# Patient Record
Sex: FEMALE | Race: WHITE | Employment: FULL TIME | URBAN - METROPOLITAN AREA
[De-identification: names, ages, dates, MRNs, and addresses within clinical notes are randomized per-mention and may not be internally consistent; named-entity substitution may affect disease eponyms.]

---

## 2017-01-07 DIAGNOSIS — E11.43 TYPE 2 DIABETES MELLITUS WITH AUTONOMIC NEUROPATHY (H): Primary | ICD-10-CM

## 2017-01-09 NOTE — TELEPHONE ENCOUNTER
Trulicity      Last Written Prescription Date: 10/17/16  Last Fill Quantity: 6ml,  # refills: 0   Last Office Visit with [facility], [facility] or [facility] prescribing provider: 10/20/16

## 2017-01-10 RX ORDER — DULAGLUTIDE 1.5 MG/.5ML
INJECTION, SOLUTION SUBCUTANEOUS
Qty: 6 ML | Refills: 1 | Status: SHIPPED | OUTPATIENT
Start: 2017-01-10 | End: 2017-06-14

## 2017-04-11 DIAGNOSIS — I10 BENIGN ESSENTIAL HYPERTENSION: ICD-10-CM

## 2017-04-11 DIAGNOSIS — E87.6 HYPOKALEMIA: ICD-10-CM

## 2017-04-11 NOTE — TELEPHONE ENCOUNTER
Metoprolol      Last Written Prescription Date: 03/22/2017  Last Fill Quantity: 30, # refills: 0  Last Office Visit with [facility] or  Health prescribing provider: 10/20/2016    Potassium      Last Written Prescription Date: 03/22/2017  Last Fill Quantity: 30, # refills: 0  Last Office Visit with [facility] or  Health prescribing provider: 10/20/2016    Next 5 appointments (look out 90 days)     Apr 20, 2017 10:00 AM CDT   (Arrive by 9:45 AM)   SHORT with Daja Obrien, NP   [facility] ([facility] )    [address] [facility] [address]   [phone]                   Potassium   Date Value Ref Range Status   10/20/2016 4.2 3.4 - 5.3 mmol/L Final     Creatinine   Date Value Ref Range Status   10/20/2016 0.66 0.52 - 1.04 mg/dL Final     BP Readings from Last 3 Encounters:   10/20/16 138/90   01/20/16 122/80   11/16/15 136/88

## 2017-04-12 RX ORDER — POTASSIUM CHLORIDE 1500 MG/1
TABLET, EXTENDED RELEASE ORAL
Qty: 30 TABLET | Refills: 0 | Status: SHIPPED | OUTPATIENT
Start: 2017-04-12 | End: 2017-05-11

## 2017-04-12 RX ORDER — METOPROLOL SUCCINATE 25 MG/1
TABLET, EXTENDED RELEASE ORAL
Qty: 30 TABLET | Refills: 5 | Status: SHIPPED | OUTPATIENT
Start: 2017-04-12 | End: 2017-11-28

## 2017-04-20 ENCOUNTER — OFFICE VISIT (OUTPATIENT)
Dept: FAMILY MEDICINE | Facility: OTHER | Age: 47
End: 2017-04-20
Attending: NURSE PRACTITIONER
Payer: COMMERCIAL

## 2017-04-20 VITALS
HEIGHT: 60 IN | TEMPERATURE: 97.2 F | HEART RATE: 107 BPM | WEIGHT: 170 LBS | DIASTOLIC BLOOD PRESSURE: 88 MMHG | SYSTOLIC BLOOD PRESSURE: 136 MMHG | BODY MASS INDEX: 33.38 KG/M2

## 2017-04-20 DIAGNOSIS — N39.46 MIXED INCONTINENCE URGE AND STRESS (MALE)(FEMALE): ICD-10-CM

## 2017-04-20 DIAGNOSIS — Z79.899 ON STATIN THERAPY: ICD-10-CM

## 2017-04-20 DIAGNOSIS — E11.42 DIABETIC POLYNEUROPATHY ASSOCIATED WITH TYPE 2 DIABETES MELLITUS (H): ICD-10-CM

## 2017-04-20 DIAGNOSIS — F41.9 ANXIETY: ICD-10-CM

## 2017-04-20 DIAGNOSIS — E11.9 TYPE 2 DIABETES MELLITUS WITHOUT COMPLICATION, WITHOUT LONG-TERM CURRENT USE OF INSULIN (H): Primary | ICD-10-CM

## 2017-04-20 DIAGNOSIS — F33.0 MILD RECURRENT MAJOR DEPRESSION (H): ICD-10-CM

## 2017-04-20 DIAGNOSIS — E55.9 VITAMIN D DEFICIENCY: ICD-10-CM

## 2017-04-20 DIAGNOSIS — I10 BENIGN ESSENTIAL HYPERTENSION: ICD-10-CM

## 2017-04-20 DIAGNOSIS — L73.9 FOLLICULITIS: ICD-10-CM

## 2017-04-20 LAB
ALBUMIN UR-MCNC: NEGATIVE MG/DL
APPEARANCE UR: CLEAR
BILIRUB UR QL STRIP: NEGATIVE
COLOR UR AUTO: YELLOW
GLUCOSE UR STRIP-MCNC: 500 MG/DL
HGB UR QL STRIP: NEGATIVE
KETONES UR STRIP-MCNC: ABNORMAL MG/DL
LEUKOCYTE ESTERASE UR QL STRIP: NEGATIVE
NITRATE UR QL: NEGATIVE
PH UR STRIP: 5.5 PH (ref 5–7)
SP GR UR STRIP: >1.03 (ref 1–1.03)
URN SPEC COLLECT METH UR: ABNORMAL
UROBILINOGEN UR STRIP-ACNC: 0.2 EU/DL (ref 0.2–1)

## 2017-04-20 PROCEDURE — 80076 HEPATIC FUNCTION PANEL: CPT | Performed by: NURSE PRACTITIONER

## 2017-04-20 PROCEDURE — 99000 SPECIMEN HANDLING OFFICE-LAB: CPT | Performed by: NURSE PRACTITIONER

## 2017-04-20 PROCEDURE — 83036 HEMOGLOBIN GLYCOSYLATED A1C: CPT | Performed by: NURSE PRACTITIONER

## 2017-04-20 PROCEDURE — 80061 LIPID PANEL: CPT | Performed by: NURSE PRACTITIONER

## 2017-04-20 PROCEDURE — 99214 OFFICE O/P EST MOD 30 MIN: CPT | Performed by: NURSE PRACTITIONER

## 2017-04-20 PROCEDURE — 82306 VITAMIN D 25 HYDROXY: CPT | Mod: 90 | Performed by: NURSE PRACTITIONER

## 2017-04-20 PROCEDURE — 80048 BASIC METABOLIC PNL TOTAL CA: CPT | Performed by: NURSE PRACTITIONER

## 2017-04-20 PROCEDURE — 84443 ASSAY THYROID STIM HORMONE: CPT | Performed by: NURSE PRACTITIONER

## 2017-04-20 PROCEDURE — 36415 COLL VENOUS BLD VENIPUNCTURE: CPT | Performed by: NURSE PRACTITIONER

## 2017-04-20 PROCEDURE — 81003 URINALYSIS AUTO W/O SCOPE: CPT | Performed by: NURSE PRACTITIONER

## 2017-04-20 RX ORDER — ESCITALOPRAM OXALATE 10 MG/1
10 TABLET ORAL DAILY
Qty: 30 TABLET | Refills: 1 | Status: SHIPPED | OUTPATIENT
Start: 2017-04-20 | End: 2017-05-04

## 2017-04-20 RX ORDER — CEPHALEXIN 500 MG/1
500 CAPSULE ORAL 2 TIMES DAILY
Qty: 20 CAPSULE | Refills: 0 | Status: SHIPPED | OUTPATIENT
Start: 2017-04-20 | End: 2017-04-30

## 2017-04-20 RX ORDER — HYDROXYZINE HYDROCHLORIDE 25 MG/1
25-50 TABLET, FILM COATED ORAL EVERY 6 HOURS PRN
Qty: 60 TABLET | Refills: 1 | Status: SHIPPED | OUTPATIENT
Start: 2017-04-20

## 2017-04-20 ASSESSMENT — ANXIETY QUESTIONNAIRES
3. WORRYING TOO MUCH ABOUT DIFFERENT THINGS: MORE THAN HALF THE DAYS
7. FEELING AFRAID AS IF SOMETHING AWFUL MIGHT HAPPEN: SEVERAL DAYS
GAD7 TOTAL SCORE: 13
6. BECOMING EASILY ANNOYED OR IRRITABLE: MORE THAN HALF THE DAYS
IF YOU CHECKED OFF ANY PROBLEMS ON THIS QUESTIONNAIRE, HOW DIFFICULT HAVE THESE PROBLEMS MADE IT FOR YOU TO DO YOUR WORK, TAKE CARE OF THINGS AT HOME, OR GET ALONG WITH OTHER PEOPLE: SOMEWHAT DIFFICULT
1. FEELING NERVOUS, ANXIOUS, OR ON EDGE: MORE THAN HALF THE DAYS
4. TROUBLE RELAXING: MORE THAN HALF THE DAYS
2. NOT BEING ABLE TO STOP OR CONTROL WORRYING: MORE THAN HALF THE DAYS
5. BEING SO RESTLESS THAT IT IS HARD TO SIT STILL: MORE THAN HALF THE DAYS

## 2017-04-20 ASSESSMENT — PAIN SCALES - GENERAL: PAINLEVEL: SEVERE PAIN (7)

## 2017-04-20 NOTE — PATIENT INSTRUCTIONS
ASSESSMENT / PLAN:  1. Type 2 diabetes mellitus without complication, without long-term current use of insulin (H)  chronic  - Hemoglobin A1c  - Lipid Profile (Chol, Trig, HDL, LDL calc)  - OPHTHALMOLOGY ADULT REFERRAL - [facility]    2. Benign essential hypertension  chronic  - Basic metabolic panel  - TSH with free T4 reflex    3. Anxiety  Increased with situation stress  - stop effexor  - escitalopram (LEXAPRO) 10 MG tablet; Take 1 tablet (10 mg) by mouth daily  Dispense: 30 tablet; Refill: 1  - hydroxyzine as ordered for anxiety or insomnia    4. Mild recurrent major depression (H)  As above    5. On statin therapy  routine  - Hepatic panel (Albumin, ALT, AST, Bili, Alk Phos, TP)    6. Diabetic polyneuropathy associated with type 2 diabetes mellitus (H)  May consider gabapentin in the future    7. Vitamin D deficiency  - Vitamin D level today  - start vit D3 2000IU daily    8. Folliculitis  Symptomatic  -warm compress to affected area  - cephALEXin (KEFLEX) 500 MG capsule; Take 1 capsule (500 mg) by mouth 2 times daily for 10 days  Dispense: 20 capsule; Refill: 0    9. Mixed incontinence urge and stress (male)(female)  symptomatic  - *UA reflex to Microscopic and Culture  - consider starting detrol next visit.     Mammogram is ordered today    Follow-up in 2 weeks or as needed for acute concerns    Theresa Sena, DARWIN-Student      Daja Obrien,   Certified Adult Nurse Practitioner  [phone]

## 2017-04-20 NOTE — PROGRESS NOTES
SUBJECTIVE:  Lili Nava, 46 year old, female presents with the following Chief Complaint(s) with HPI to follow:  Chief Complaint   Patient presents with     Diabetes     Depression     Anxiety          HPI:  Lili presents today with the above concern.  Her main concern is her mood.      Diabetes Follow-up      Patient is checking blood sugars: once daily.  Results:       am - averaging 125    Symptoms of hypoglycemia (low blood sugar): none    Paresthesias (numbness or burning in feet) or sores: Yes, intermittent sensations in hands and feet and thigh area. Foot sensations disrupts sleeps sometimes.    Diabetic eye exam within the last year: No    Breakfast eaten regularly: Yes    Patient counting carbs: No     Hyperlipidemia Follow-up      Low fat diet rated as: Fair    Muscle aches or other side effects: None     Hypertension Follow-up      Outpatient blood pressures are not being checked.    Diet: no added salt     BP Readings from Last 6 Encounters:   04/20/17 136/88   10/20/16 138/90   01/20/16 122/80   11/16/15 136/88   10/15/15 122/60   07/16/15 132/80       Depression Follow-Up    Status since last visit: Worsened due to situational events. Daughter was checked into inpatient treatment yesterday, daughter attempted suicide in December 2016, having relationship issues with her boyfriend.    See PHQ-9 for current symptoms.    Other associated symptoms:None    Complicating factors:   Significant life event: Yes-  As above   Current substance abuse: None  Anxiety / Panic symptoms: Yes-  Has not tried Atarax, tries to avoid triggers and stays home. She does have a good friend who is her support system and has been there for her through her stressful life events.   Goes to counseling at [facility]  PHQ-9  English PHQ-9   Any Language            Patient Active Problem List   Diagnosis     Type 2 diabetes mellitus without complication, without long-term current use of insulin (H)     Benign essential  hypertension     Anxiety     Mild recurrent major depression (H)     Post traumatic stress disorder     Diabetic neuropathy associated with type 2 diabetes mellitus (H)     Primary insomnia     Eczema     Past Surgical History:   Procedure Laterality Date     HYSTERECTOMY         Social History   Substance Use Topics     Smoking status: Former Smoker     Smokeless tobacco: Never Used     Alcohol use No     Family History   Problem Relation Age of Onset     Hypertension Mother      Coronary Artery Disease Father          Current Outpatient Prescriptions   Medication Sig Dispense Refill     metoprolol (TOPROL-XL) 25 MG 24 hr tablet TAKE 1 TABLET BY MOUTH ROLA Y 30 tablet 5     potassium chloride SA (K-DUR/KLOR-CON M) 20 MEQ CR tablet TAKE 1 TABLET BY MOUTH EVER Y DAY 30 tablet 0     TRULICITY 1.5 MG/0.5ML pen INJECT 1.5 MG SUBCUTANEOUS EVERY 7 DAYS 6 mL 1     simvastatin (ZOCOR) 10 MG tablet TAKE 1 TABLET BY MOUTH NIGH TLY AT BEDTIME 90 tablet 2     escitalopram (LEXAPRO) 10 MG tablet TAKE 1 TABLET BY MOUTH ROLA Y 30 tablet 8     blood glucose monitoring (ONE TOUCH ULTRA MINI) meter device kit Use to test blood sugars 2 times daily or as directed. 1 kit 0     blood glucose monitoring (NO BRAND SPECIFIED) test strip Use to test blood sugars 2 times daily or as directed 100 each 11     ibuprofen (ADVIL,MOTRIN) 800 MG tablet Take 1 tablet (800 mg) by mouth every 8 hours as needed for moderate pain 90 tablet 1     venlafaxine (EFFEXOR-XR) 150 MG 24 hr capsule take 1 capsule daily. 30 capsule 5     losartan (COZAAR) 25 MG tablet Take 2 tablets (50 mg) by mouth daily 30 tablet 3     hydrOXYzine (ATARAX) 25 MG tablet Take 1-2 tablets (25-50 mg) by mouth every 6 hours as needed for anxiety 60 tablet 1     Allergies   Allergen Reactions     Celexa [Citalopram] Other (See Comments)     jittery     Lisinopril Cough     Recent Labs   Lab Test  10/20/16   1046  01/20/16   1110   11/17/15   1229   10/15/15   1217   A1C  7.4*  7.1*    --    --    --   7.6*   LDL  108*  91   --    --    --   96   HDL  51  50   --    --    --   51   TRIG  84  100   --    --    --   89   ALT  41   --    --   46   --    --    CR  0.66  0.58   < >  0.71   < >  0.54   GFRESTIMATED  >90  Non  GFR Calc    >90  Non  GFR Calc     < >  89   < >  >90  Non  GFR Calc     GFRESTBLACK  >90   GFR Calc    >90   GFR Calc     < >  >90   GFR Calc     < >  >90   GFR Calc     POTASSIUM  4.2  4.2   < >  3.0*   < >  4.2   TSH  1.61  1.72   --    --    --   2.23    < > = values in this interval not displayed.      BP Readings from Last 3 Encounters:   04/20/17 136/88   10/20/16 138/90   01/20/16 122/80    Wt Readings from Last 3 Encounters:   04/20/17 170 lb (77.1 kg)   10/20/16 187 lb (84.8 kg)   01/20/16 175 lb (79.4 kg)                    REVIEW OF SYSTEMS  Skin: positive for lump under left breast, negative for, rash, hair changes, nail changes  Eyes: positive for visual changes since previous eye exam  Ears/Nose/Throat: negative  Respiratory: No shortness of breath, dyspnea on exertion, cough, or hemoptysis  Cardiovascular: Positive for occasional LE edema, negative for, palpitations, tachycardia, irregular heart beat, chest pain, exertional chest pain or pressure, dyspnea on exertion,and syncope or near-syncope  Gastrointestinal: positive for nausea related to anxiety, negative for, vomiting, abdominal pain, constipation and diarrhea  Genitourinary: positive for incontinence: stress and urge, negative for, dysuria, frequency, hesitancy and hematuria  Musculoskeletal: negative for, joint pain, joint swelling and joint stiffness  Neurologic: positive for chronic headaches and intermittent periods of numbness or tingling of hands and numbness or tingling of feet  Psychiatric: positive for sleep disturbance, feeling anxious, anxiety and  depression  Hematologic/Lymphatic/Immunologic: negative for, chills and fever  Endocrine: positive for diabetes    OBJECTIVE:    /88 (Cuff Size: Adult Regular)  Pulse 107  Temp 97.2  F (36.2  C) (Tympanic)  Ht 5' (1.524 m)  Wt 170 lb (77.1 kg)  BMI 33.2 kg/m2  Constitutional: alert and no distress  Head: Normocephalic. No masses, lesions, tenderness or abnormalities  ENT: neck without nodes  Neck: neck supple, no lymphadenopathy, trachea midline, thyroid symmetrical with out nodules noted.    Cardiovascular: RRR. Normal S1 and S2. No S3, S4. No murmurs, clicks gallops or rub  Respiratory:  Respirations easy, even, unlabored. Lungs clear  Musculoskeletal: extremities normal- no gross deformities noted, gait normal, normal muscle tone, no edema to the bilateral lower extremities and peripheral pulses normal  Skin: erythematous papule to left lower quadrant of left breast  Neurologic: Gait normal.   Psychiatric: mentation appears normal and affect normal  Hematologic/Lymphatic/Immunologic: normal ant/post cervical and supraclavicular nodes      ASSESSMENT / PLAN:  1. Type 2 diabetes mellitus without complication, without long-term current use of insulin (H)  chronic  - Hemoglobin A1c  - Lipid Profile (Chol, Trig, HDL, LDL calc)  - OPHTHALMOLOGY ADULT REFERRAL - [facility]    2. Benign essential hypertension  chronic  - Basic metabolic panel  - TSH with free T4 reflex    3. Anxiety  Increased with situation stress  - stop effexor  - start escitalopram (LEXAPRO) 10 MG tablet; Take 1 tablet (10 mg) by mouth daily  Dispense: 30 tablet; Refill: 1  - hydroxyzine as ordered for anxiety or insomnia  - Continue with counseling at [facility]    4. Mild recurrent major depression (H)  As above    5. On statin therapy  routine  - Hepatic panel (Albumin, ALT, AST, Bili, Alk Phos, TP)    6. Diabetic polyneuropathy associated with type 2 diabetes mellitus (H)  May consider gabapentin in the future    7.  Vitamin D deficiency  - Vitamin D level today  - start vit D3 2000IU daily    8. Folliculitis  Symptomatic  -warm compress to affected area  - cephALEXin (KEFLEX) 500 MG capsule; Take 1 capsule (500 mg) by mouth 2 times daily for 10 days  Dispense: 20 capsule; Refill: 0    9. Mixed incontinence urge and stress (male)(female)  symptomatic  - *UA reflex to Microscopic and Culture  - consider starting detrol next visit.     Mammogram is ordered today    Follow-up in 2 weeks or as needed for acute concerns    DARWIN Sorensen-Student    Patient is seen in conjunction with NP student.  History is reviewed with patient and pertinent portions of the exam are repeated.  Assessment and plan is reviewed with the patient.    Daja Obrien,   Certified Adult Nurse Practitioner  [phone]

## 2017-04-20 NOTE — MR AVS SNAPSHOT
After Visit Summary   4/20/2017    Lili Nava    MRN: [id number]           Patient Information     Date Of Birth          1970        Visit Information        Provider Department      4/20/2017 3:30 PM Daja Obrien NP [facility]        Today's Diagnoses     Type 2 diabetes mellitus without complication, without long-term current use of insulin (H)    -  1    Benign essential hypertension        Anxiety        Mild recurrent major depression (H)        On statin therapy        Diabetic polyneuropathy associated with type 2 diabetes mellitus (H)        Vitamin D deficiency        Folliculitis        Mixed incontinence urge and stress (male)(female)          Care Instructions      ASSESSMENT / PLAN:  1. Type 2 diabetes mellitus without complication, without long-term current use of insulin (H)  chronic  - Hemoglobin A1c  - Lipid Profile (Chol, Trig, HDL, LDL calc)  - OPHTHALMOLOGY ADULT REFERRAL - [facility]    2. Benign essential hypertension  chronic  - Basic metabolic panel  - TSH with free T4 reflex    3. Anxiety  Increased with situation stress  - stop effexor  - escitalopram (LEXAPRO) 10 MG tablet; Take 1 tablet (10 mg) by mouth daily  Dispense: 30 tablet; Refill: 1  - hydroxyzine as ordered for anxiety or insomnia    4. Mild recurrent major depression (H)  As above    5. On statin therapy  routine  - Hepatic panel (Albumin, ALT, AST, Bili, Alk Phos, TP)    6. Diabetic polyneuropathy associated with type 2 diabetes mellitus (H)  May consider gabapentin in the future    7. Vitamin D deficiency  - Vitamin D level today  - start vit D3 2000IU daily    8. Folliculitis  Symptomatic  -warm compress to affected area  - cephALEXin (KEFLEX) 500 MG capsule; Take 1 capsule (500 mg) by mouth 2 times daily for 10 days  Dispense: 20 capsule; Refill: 0    9. Mixed incontinence urge and stress (male)(female)  symptomatic  - *UA reflex to Microscopic and Culture  -  consider starting detrol next visit.     Mammogram is ordered today    Follow-up in 2 weeks or as needed for acute concerns    Theresa Sena, FNP-Student      Daja Obrien,   Certified Adult Nurse Practitioner  [phone]          Follow-ups after your visit        Additional Services     OPHTHALMOLOGY ADULT REFERRAL       Your provider has referred you to: [facility]    Please be aware that coverage of these services is subject to the terms and limitations of your health insurance plan.  Call member services at your health plan with any benefit or coverage questions.      Please bring the following with you to your appointment:    (1) Any X-Rays, CTs or MRIs which have been performed.  Contact the facility where they were done to arrange for  prior to your scheduled appointment.    (2) List of current medications  (3) This referral request   (4) Any documents/labs given to you for this referral                  Who to contact     If you have questions or need follow up information about today's clinic visit or your schedule please contact [facility] directly at [phone].  Normal or non-critical lab and imaging results will be communicated to you by MyChart, letter or phone within 4 business days after the clinic has received the results. If you do not hear from us within 7 days, please contact the clinic through MyChart or phone. If you have a critical or abnormal lab result, we will notify you by phone as soon as possible.  Submit refill requests through [employer] or call your pharmacy and they will forward the refill request to us. Please allow 3 business days for your refill to be completed.          Additional Information About Your Visit        [facility] Information     [employer] gives you secure access to your electronic health record. If you see a primary care provider, you can also send messages to your care team and make appointments. If you have questions,  please call your primary care clinic.  If you do not have a primary care provider, please call [phone] and they will assist you.        Care EveryWhere ID     This is your Care EveryWhere ID. This could be used by other organizations to access your [place] medical records  [id number]        Your Vitals Were     Pulse Temperature Height BMI (Body Mass Index)          107 97.2  F (36.2  C) (Tympanic) 5' (1.524 m) 33.2 kg/m2         Blood Pressure from Last 3 Encounters:   04/20/17 136/88   10/20/16 138/90   01/20/16 122/80    Weight from Last 3 Encounters:   04/20/17 170 lb (77.1 kg)   10/20/16 187 lb (84.8 kg)   01/20/16 175 lb (79.4 kg)              We Performed the Following     *UA reflex to Microscopic and Culture     Basic metabolic panel     Hemoglobin A1c     Hepatic panel (Albumin, ALT, AST, Bili, Alk Phos, TP)     Lipid Profile (Chol, Trig, HDL, LDL calc)     OPHTHALMOLOGY ADULT REFERRAL     TSH with free T4 reflex     Vitamin D Deficiency          Today's Medication Changes          These changes are accurate as of: 4/20/17  3:51 PM.  If you have any questions, ask your nurse or doctor.               Start taking these medicines.        Dose/Directions    cephALEXin 500 MG capsule   Commonly known as:  KEFLEX   Used for:  Folliculitis   Started by:  Daja Obrien NP        Dose:  500 mg   Take 1 capsule (500 mg) by mouth 2 times daily for 10 days   Quantity:  20 capsule   Refills:  0         These medicines have changed or have updated prescriptions.        Dose/Directions    escitalopram 10 MG tablet   Commonly known as:  LEXAPRO   This may have changed:  See the new instructions.   Used for:  Anxiety   Changed by:  Daja Obrien NP        Dose:  10 mg   Take 1 tablet (10 mg) by mouth daily   Quantity:  30 tablet   Refills:  1       hydrOXYzine 25 MG tablet   Commonly known as:  ATARAX   This may have changed:  reasons to take this   Used for:  Anxiety   Changed by:   Daja Obrien NP        Dose:  25-50 mg   Take 1-2 tablets (25-50 mg) by mouth every 6 hours as needed for anxiety (or insomnia)   Quantity:  60 tablet   Refills:  1         Stop taking these medicines if you haven't already. Please contact your care team if you have questions.     venlafaxine 150 MG 24 hr capsule   Commonly known as:  EFFEXOR-XR   Stopped by:  Daja Obrien NP                Where to get your medicines      These medications were sent to [facility] - [place], [place] - [address]  [address] [address]     Phone:  [phone]     cephALEXin 500 MG capsule    escitalopram 10 MG tablet    hydrOXYzine 25 MG tablet                Primary Care Provider Office Phone # Fax #    Daja Obrien [phone]       [facility] [address] [place] [address] [address] JOHNSON  [facility] [address]        Thank you!     Thank you for choosing [facility]  for your care. Our goal is always to provide you with excellent care. Hearing back from our patients is one way we can continue to improve our services. Please take a few minutes to complete the written survey that you may receive in the mail after your visit with us. Thank you!             Your Updated Medication List - Protect others around you: Learn how to safely use, store and throw away your medicines at www.disposemymeds.org.          This list is accurate as of: 4/20/17  3:51 PM.  Always use your most recent med list.                   Brand Name Dispense Instructions for use    blood glucose monitoring meter device kit     1 kit    Use to test blood sugars 2 times daily or as directed.       blood glucose monitoring test strip    no brand specified    100 each    Use to test blood sugars 2 times daily or as directed       cephALEXin 500 MG capsule    KEFLEX    20 capsule    Take 1 capsule (500 mg) by mouth 2 times daily for 10 days       escitalopram 10 MG tablet    LEXAPRO     30 tablet    Take 1 tablet (10 mg) by mouth daily       hydrOXYzine 25 MG tablet    ATARAX    60 tablet    Take 1-2 tablets (25-50 mg) by mouth every 6 hours as needed for anxiety (or insomnia)       ibuprofen 800 MG tablet    ADVIL/MOTRIN    90 tablet    Take 1 tablet (800 mg) by mouth every 8 hours as needed for moderate pain       losartan 25 MG tablet    COZAAR    30 tablet    Take 2 tablets (50 mg) by mouth daily       metoprolol 25 MG 24 hr tablet    TOPROL-XL    30 tablet    TAKE 1 TABLET BY MOUTH ROLA Y       potassium chloride SA 20 MEQ CR tablet    K-DUR/KLOR-CON M    30 tablet    TAKE 1 TABLET BY MOUTH EVER Y DAY       simvastatin 10 MG tablet    ZOCOR    90 tablet    TAKE 1 TABLET BY MOUTH NIGH TLY AT BEDTIME       TRULICITY 1.5 MG/0.5ML pen   Generic drug:  dulaglutide     6 mL    INJECT 1.5 MG SUBCUTANEOUS EVERY 7 DAYS

## 2017-04-20 NOTE — NURSING NOTE
Chief Complaint   Patient presents with     Diabetes     Depression     Anxiety       Initial /88 (Cuff Size: Adult Regular)  Pulse 107  Temp 97.2  F (36.2  C) (Tympanic)  Ht 5' (1.524 m)  Wt 170 lb (77.1 kg)  BMI 33.2 kg/m2 Estimated body mass index is 33.2 kg/(m^2) as calculated from the following:    Height as of this encounter: 5' (1.524 m).    Weight as of this encounter: 170 lb (77.1 kg).  Medication Reconciliation: tano Ty

## 2017-04-21 LAB
ALBUMIN SERPL-MCNC: 3.8 G/DL (ref 3.4–5)
ALP SERPL-CCNC: 92 U/L (ref 40–150)
ALT SERPL W P-5'-P-CCNC: 41 U/L (ref 0–50)
ANION GAP SERPL CALCULATED.3IONS-SCNC: 11 MMOL/L (ref 3–14)
AST SERPL W P-5'-P-CCNC: 24 U/L (ref 0–45)
BILIRUB DIRECT SERPL-MCNC: 0.1 MG/DL (ref 0–0.2)
BILIRUB SERPL-MCNC: 0.3 MG/DL (ref 0.2–1.3)
BUN SERPL-MCNC: 13 MG/DL (ref 7–30)
CALCIUM SERPL-MCNC: 9 MG/DL (ref 8.5–10.1)
CHLORIDE SERPL-SCNC: 104 MMOL/L (ref 94–109)
CHOLEST SERPL-MCNC: 168 MG/DL
CO2 SERPL-SCNC: 24 MMOL/L (ref 20–32)
CREAT SERPL-MCNC: 0.65 MG/DL (ref 0.52–1.04)
EST. AVERAGE GLUCOSE BLD GHB EST-MCNC: 171 MG/DL
GFR SERPL CREATININE-BSD FRML MDRD: ABNORMAL ML/MIN/1.7M2
GLUCOSE SERPL-MCNC: 261 MG/DL (ref 70–99)
HBA1C MFR BLD: 7.6 % (ref 4.3–6)
HDLC SERPL-MCNC: 51 MG/DL
LDLC SERPL CALC-MCNC: 84 MG/DL
NONHDLC SERPL-MCNC: 117 MG/DL
POTASSIUM SERPL-SCNC: 4.1 MMOL/L (ref 3.4–5.3)
PROT SERPL-MCNC: 7.7 G/DL (ref 6.8–8.8)
SODIUM SERPL-SCNC: 139 MMOL/L (ref 133–144)
TRIGL SERPL-MCNC: 167 MG/DL
TSH SERPL DL<=0.005 MIU/L-ACNC: 1.14 MU/L (ref 0.4–4)

## 2017-04-21 ASSESSMENT — ANXIETY QUESTIONNAIRES: GAD7 TOTAL SCORE: 13

## 2017-04-21 ASSESSMENT — PATIENT HEALTH QUESTIONNAIRE - PHQ9: SUM OF ALL RESPONSES TO PHQ QUESTIONS 1-9: 16

## 2017-04-23 LAB — DEPRECATED CALCIDIOL+CALCIFEROL SERPL-MC: 15 UG/L (ref 20–75)

## 2017-04-24 PROBLEM — E55.9 VITAMIN D DEFICIENCY: Status: ACTIVE | Noted: 2017-04-24

## 2017-04-24 RX ORDER — METFORMIN HCL 500 MG
500 TABLET, EXTENDED RELEASE 24 HR ORAL 2 TIMES DAILY WITH MEALS
Qty: 180 TABLET | Refills: 1 | Status: SHIPPED | OUTPATIENT
Start: 2017-04-24 | End: 2017-11-28

## 2017-04-24 RX ORDER — ERGOCALCIFEROL 1.25 MG/1
CAPSULE, LIQUID FILLED ORAL
Qty: 36 CAPSULE | Refills: 0 | Status: SHIPPED | OUTPATIENT
Start: 2017-04-24 | End: 2020-02-26

## 2017-05-04 ENCOUNTER — OFFICE VISIT (OUTPATIENT)
Dept: FAMILY MEDICINE | Facility: OTHER | Age: 47
End: 2017-05-04
Attending: NURSE PRACTITIONER
Payer: COMMERCIAL

## 2017-05-04 VITALS
TEMPERATURE: 98.2 F | HEIGHT: 60 IN | DIASTOLIC BLOOD PRESSURE: 80 MMHG | HEART RATE: 80 BPM | SYSTOLIC BLOOD PRESSURE: 138 MMHG | WEIGHT: 182.2 LBS | RESPIRATION RATE: 14 BRPM | BODY MASS INDEX: 35.77 KG/M2

## 2017-05-04 DIAGNOSIS — F33.0 MILD RECURRENT MAJOR DEPRESSION (H): Primary | ICD-10-CM

## 2017-05-04 DIAGNOSIS — E11.42 DIABETIC POLYNEUROPATHY ASSOCIATED WITH TYPE 2 DIABETES MELLITUS (H): ICD-10-CM

## 2017-05-04 DIAGNOSIS — N39.46 MIXED INCONTINENCE URGE AND STRESS (MALE)(FEMALE): ICD-10-CM

## 2017-05-04 DIAGNOSIS — F41.9 ANXIETY: ICD-10-CM

## 2017-05-04 DIAGNOSIS — G89.29 CHRONIC BILATERAL LOW BACK PAIN WITH SCIATICA, SCIATICA LATERALITY UNSPECIFIED: ICD-10-CM

## 2017-05-04 DIAGNOSIS — M54.2 CERVICALGIA: ICD-10-CM

## 2017-05-04 DIAGNOSIS — M54.40 CHRONIC BILATERAL LOW BACK PAIN WITH SCIATICA, SCIATICA LATERALITY UNSPECIFIED: ICD-10-CM

## 2017-05-04 PROCEDURE — 99212 OFFICE O/P EST SF 10 MIN: CPT

## 2017-05-04 PROCEDURE — 99214 OFFICE O/P EST MOD 30 MIN: CPT | Performed by: NURSE PRACTITIONER

## 2017-05-04 RX ORDER — IBUPROFEN 800 MG/1
800 TABLET, FILM COATED ORAL EVERY 8 HOURS PRN
Qty: 90 TABLET | Refills: 1 | Status: SHIPPED | OUTPATIENT
Start: 2017-05-04 | End: 2017-08-14

## 2017-05-04 RX ORDER — OXYBUTYNIN CHLORIDE 10 MG/1
10 TABLET, EXTENDED RELEASE ORAL DAILY
Qty: 30 TABLET | Refills: 5 | Status: SHIPPED | OUTPATIENT
Start: 2017-05-04 | End: 2017-11-28

## 2017-05-04 RX ORDER — GABAPENTIN 300 MG/1
CAPSULE ORAL
Qty: 90 CAPSULE | Refills: 0 | Status: SHIPPED | OUTPATIENT
Start: 2017-05-04 | End: 2017-11-28

## 2017-05-04 RX ORDER — ESCITALOPRAM OXALATE 20 MG/1
20 TABLET ORAL DAILY
Qty: 30 TABLET | Refills: 5 | Status: SHIPPED | OUTPATIENT
Start: 2017-05-04 | End: 2020-04-13

## 2017-05-04 ASSESSMENT — ANXIETY QUESTIONNAIRES
3. WORRYING TOO MUCH ABOUT DIFFERENT THINGS: SEVERAL DAYS
1. FEELING NERVOUS, ANXIOUS, OR ON EDGE: SEVERAL DAYS
4. TROUBLE RELAXING: SEVERAL DAYS
IF YOU CHECKED OFF ANY PROBLEMS ON THIS QUESTIONNAIRE, HOW DIFFICULT HAVE THESE PROBLEMS MADE IT FOR YOU TO DO YOUR WORK, TAKE CARE OF THINGS AT HOME, OR GET ALONG WITH OTHER PEOPLE: NOT DIFFICULT AT ALL
5. BEING SO RESTLESS THAT IT IS HARD TO SIT STILL: SEVERAL DAYS
2. NOT BEING ABLE TO STOP OR CONTROL WORRYING: SEVERAL DAYS
7. FEELING AFRAID AS IF SOMETHING AWFUL MIGHT HAPPEN: NEARLY EVERY DAY
GAD7 TOTAL SCORE: 9
6. BECOMING EASILY ANNOYED OR IRRITABLE: SEVERAL DAYS

## 2017-05-04 ASSESSMENT — PAIN SCALES - GENERAL: PAINLEVEL: MODERATE PAIN (5)

## 2017-05-04 NOTE — MR AVS SNAPSHOT
After Visit Summary   5/4/2017    Lili Nava    MRN: [id number]           Patient Information     Date Of Birth          1970        Visit Information        Provider Department      5/4/2017 3:45 PM Daja Obrien NP [facility]        Today's Diagnoses     Mild recurrent major depression (H)    -  1    Anxiety        Mixed incontinence urge and stress (male)(female)        Diabetic polyneuropathy associated with type 2 diabetes mellitus (H)        Chronic bilateral low back pain with sciatica, sciatica laterality unspecified        Cervicalgia          Care Instructions      1. Mild recurrent major depression (H)  chronic    2. Anxiety  - increase escitalopram (LEXAPRO) 20 MG tablet; Take 1 tablet (20 mg) by mouth daily  Dispense: 30 tablet; Refill: 5  - gabapentin (NEURONTIN) 300 MG capsule; Take 1 tablet (300 mg) every night for 1-3 days, then 1 tablet twice daily for 1-3 days, then 1 tablet three times daily  Dispense: 90 capsule; Refill: 0    3. Mixed incontinence urge and stress (male)(female)  - start oxybutynin (DITROPAN-XL) 10 MG 24 hr tablet; Take 1 tablet (10 mg) by mouth daily  Dispense: 30 tablet; Refill: 5    4. Diabetic polyneuropathy associated with type 2 diabetes mellitus (H)  chronic  - gabapentin (NEURONTIN) 300 MG capsule; Take 1 tablet (300 mg) every night for 1-3 days, then 1 tablet twice daily for 1-3 days, then 1 tablet three times daily  Dispense: 90 capsule; Refill: 0    5. Chronic bilateral low back pain with sciatica, sciatica laterality unspecified  chronic  - gabapentin (NEURONTIN) 300 MG capsule; Take 1 tablet (300 mg) every night for 1-3 days, then 1 tablet twice daily for 1-3 days, then 1 tablet three times daily  Dispense: 90 capsule; Refill: 0  - PLASTIC SURGERY REFERRAL - [place], breast reduction    6. Cervicalgia  chronic  - PLASTIC SURGERY REFERRAL - as above    FUTURE APPOINTMENTS:       - Follow-up visit in 1 month - or as  needed for acute concerns.     Daja Obrien NP  [facility]          Follow-ups after your visit        Additional Services     PLASTIC SURGERY REFERRAL       Your provider has referred you to: [facility] - breast reduction due to upper back and neck pain    Please be aware that coverage of these services is subject to the terms and limitations of your health insurance plan.  Call member services at your health plan with any benefit or coverage questions.      Please bring the following with you to your appointment:    (1) Any X-Rays, CTs or MRIs which have been performed.  Contact the facility where they were done to arrange for  prior to your scheduled appointment.    (2) List of current medications  (3) This referral request   (4) Any documents/labs given to you for this referral                  Your next 10 appointments already scheduled     Jun 05, 2017  3:45 PM CDT   (Arrive by 3:30 PM)   SHORT with Daja Obrien NP   [facility] ([facility] )    [address] [address]   [phone]              Who to contact     If you have questions or need follow up information about today's clinic visit or your schedule please contact [facility] directly at [phone].  Normal or non-critical lab and imaging results will be communicated to you by MyChart, letter or phone within 4 business days after the clinic has received the results. If you do not hear from us within 7 days, please contact the clinic through MyChart or phone. If you have a critical or abnormal lab result, we will notify you by phone as soon as possible.  Submit refill requests through [employer] or call your pharmacy and they will forward the refill request to us. Please allow 3 business days for your refill to be completed.          Additional Information About Your Visit        [employer] Information     [employer] gives you secure access to your  electronic health record. If you see a primary care provider, you can also send messages to your care team and make appointments. If you have questions, please call your primary care clinic.  If you do not have a primary care provider, please call [phone] and they will assist you.        Care EveryWhere ID     This is your Care EveryWhere ID. This could be used by other organizations to access your [facility] medical records  [id number]        Your Vitals Were     Pulse Temperature Respirations Height BMI (Body Mass Index)       80 98.2  F (36.8  C) (Tympanic) 14 5' (1.524 m) 35.58 kg/m2        Blood Pressure from Last 3 Encounters:   05/04/17 138/80   04/20/17 136/88   10/20/16 138/90    Weight from Last 3 Encounters:   05/04/17 182 lb 3.2 oz (82.6 kg)   04/20/17 170 lb (77.1 kg)   10/20/16 187 lb (84.8 kg)              We Performed the Following     PLASTIC SURGERY REFERRAL          Today's Medication Changes          These changes are accurate as of: 5/4/17  3:49 PM.  If you have any questions, ask your nurse or doctor.               Start taking these medicines.        Dose/Directions    gabapentin 300 MG capsule   Commonly known as:  NEURONTIN   Used for:  Anxiety, Diabetic polyneuropathy associated with type 2 diabetes mellitus (H), Chronic bilateral low back pain with sciatica, sciatica laterality unspecified   Started by:  Daja Obrien NP        Take 1 tablet (300 mg) every night for 1-3 days, then 1 tablet twice daily for 1-3 days, then 1 tablet three times daily   Quantity:  90 capsule   Refills:  0       oxybutynin 10 MG 24 hr tablet   Commonly known as:  DITROPAN-XL   Used for:  Mixed incontinence urge and stress (male)(female)   Started by:  Daja Obrien NP        Dose:  10 mg   Take 1 tablet (10 mg) by mouth daily   Quantity:  30 tablet   Refills:  5         These medicines have changed or have updated prescriptions.        Dose/Directions    escitalopram 20 MG tablet    Commonly known as:  LEXAPRO   This may have changed:    - medication strength  - how much to take   Used for:  Anxiety   Changed by:  Daja Obrien NP        Dose:  20 mg   Take 1 tablet (20 mg) by mouth daily   Quantity:  30 tablet   Refills:  5       losartan 25 MG tablet   Commonly known as:  COZAAR   This may have changed:  how much to take   Used for:  Benign essential hypertension        Dose:  50 mg   Take 2 tablets (50 mg) by mouth daily   Quantity:  30 tablet   Refills:  3            Where to get your medicines      These medications were sent to [id number] - [place], [place] - [address]  [address] [address]     Phone:  [phone]     escitalopram 20 MG tablet    gabapentin 300 MG capsule    ibuprofen 800 MG tablet    oxybutynin 10 MG 24 hr tablet                Primary Care Provider Office Phone # Fax #    Daja Obrien [phone]       [facility] [address] [place] [address] DR JOHNSON  [facility] [address]        Thank you!     Thank you for choosing [facility]  for your care. Our goal is always to provide you with excellent care. Hearing back from our patients is one way we can continue to improve our services. Please take a few minutes to complete the written survey that you may receive in the mail after your visit with us. Thank you!             Your Updated Medication List - Protect others around you: Learn how to safely use, store and throw away your medicines at www.disposemymeds.org.          This list is accurate as of: 5/4/17  3:49 PM.  Always use your most recent med list.                   Brand Name Dispense Instructions for use    blood glucose monitoring meter device kit     1 kit    Use to test blood sugars 2 times daily or as directed.       blood glucose monitoring test strip    no brand specified    100 each    Use to test blood sugars 2 times daily or as directed       escitalopram 20 MG tablet     LEXAPRO    30 tablet    Take 1 tablet (20 mg) by mouth daily       gabapentin 300 MG capsule    NEURONTIN    90 capsule    Take 1 tablet (300 mg) every night for 1-3 days, then 1 tablet twice daily for 1-3 days, then 1 tablet three times daily       hydrOXYzine 25 MG tablet    ATARAX    60 tablet    Take 1-2 tablets (25-50 mg) by mouth every 6 hours as needed for anxiety (or insomnia)       ibuprofen 800 MG tablet    ADVIL/MOTRIN    90 tablet    Take 1 tablet (800 mg) by mouth every 8 hours as needed for moderate pain       losartan 25 MG tablet    COZAAR    30 tablet    Take 2 tablets (50 mg) by mouth daily       metFORMIN 500 MG 24 hr tablet    GLUCOPHAGE-XR    180 tablet    Take 1 tablet (500 mg) by mouth 2 times daily (with meals)       metoprolol 25 MG 24 hr tablet    TOPROL-XL    30 tablet    TAKE 1 TABLET BY MOUTH ROLA Y       oxybutynin 10 MG 24 hr tablet    DITROPAN-XL    30 tablet    Take 1 tablet (10 mg) by mouth daily       potassium chloride SA 20 MEQ CR tablet    K-DUR/KLOR-CON M    30 tablet    TAKE 1 TABLET BY MOUTH EVER Y DAY       simvastatin 10 MG tablet    ZOCOR    90 tablet    TAKE 1 TABLET BY MOUTH NIGH TLY AT BEDTIME       TRULICITY 1.5 MG/0.5ML pen   Generic drug:  dulaglutide     6 mL    INJECT 1.5 MG SUBCUTANEOUS EVERY 7 DAYS       VITAMIN D (CHOLECALCIFEROL) PO      Take 2,000 Units by mouth daily       vitamin D 31555 UNIT capsule    ERGOCALCIFEROL    36 capsule    Take one capsule by mouth every Monday, Wednesday and Friday

## 2017-05-04 NOTE — NURSING NOTE
Chief Complaint   Patient presents with     Depression     Anxiety     *_* Health Care Directive *_*     info given        Initial /80 (BP Location: Right arm, Patient Position: Chair, Cuff Size: Adult Large)  Pulse 80  Temp 98.2  F (36.8  C) (Tympanic)  Resp 14  Ht 5' (1.524 m)  Wt 182 lb 3.2 oz (82.6 kg)  BMI 35.58 kg/m2 Estimated body mass index is 35.58 kg/(m^2) as calculated from the following:    Height as of this encounter: 5' (1.524 m).    Weight as of this encounter: 182 lb 3.2 oz (82.6 kg).  Medication Reconciliation: tano MIRELES

## 2017-05-04 NOTE — PATIENT INSTRUCTIONS
1. Mild recurrent major depression (H)  chronic    2. Anxiety  - increase escitalopram (LEXAPRO) 20 MG tablet; Take 1 tablet (20 mg) by mouth daily  Dispense: 30 tablet; Refill: 5  - gabapentin (NEURONTIN) 300 MG capsule; Take 1 tablet (300 mg) every night for 1-3 days, then 1 tablet twice daily for 1-3 days, then 1 tablet three times daily  Dispense: 90 capsule; Refill: 0    3. Mixed incontinence urge and stress (male)(female)  - start oxybutynin (DITROPAN-XL) 10 MG 24 hr tablet; Take 1 tablet (10 mg) by mouth daily  Dispense: 30 tablet; Refill: 5    4. Diabetic polyneuropathy associated with type 2 diabetes mellitus (H)  chronic  - gabapentin (NEURONTIN) 300 MG capsule; Take 1 tablet (300 mg) every night for 1-3 days, then 1 tablet twice daily for 1-3 days, then 1 tablet three times daily  Dispense: 90 capsule; Refill: 0    5. Chronic bilateral low back pain with sciatica, sciatica laterality unspecified  chronic  - gabapentin (NEURONTIN) 300 MG capsule; Take 1 tablet (300 mg) every night for 1-3 days, then 1 tablet twice daily for 1-3 days, then 1 tablet three times daily  Dispense: 90 capsule; Refill: 0  - PLASTIC SURGERY REFERRAL - [place], breast reduction    6. Cervicalgia  chronic  - PLASTIC SURGERY REFERRAL - as above    FUTURE APPOINTMENTS:       - Follow-up visit in 1 month - or as needed for acute concerns.     Daja Obrien, NP  [facility]

## 2017-05-04 NOTE — PROGRESS NOTES
SUBJECTIVE:                                                    Lili Nava is a 46 year old female who presents to clinic today for the following health issues:        Depression and Anxiety Follow-Up    Status since last visit: Improved since stopping effexor and starting lexapro - does feel there is room for imrpovement    Other associated symptoms:anxiety - slowly improving    Complicating factors:     Significant life event: daughter is in in-patient psych unit for thoughts of self -harm.       Current substance abuse: None    PHQ-9 SCORE 4/20/2017 5/4/2017   Total Score 16 14     VIR[place]-7 SCORE 10/20/2016 4/20/2017 5/4/2017   Total Score 16 13 9        PHQ-9  English      PHQ-9   Any Language     GAD7     Back Pain Follow Up       Description:   Location of pain:  neck  Character of pain: sharp, dull ache that is intermittent.  Numbness/tingling in feet is constant - neuropathy from diabetes  Pain radiation: radiates below the knee   Since last visit, pain is:  worsened  New numbness or weakness in legs, not attributed to pain:  no     Intensity: moderate    History:   Pain interferes with job: usually not  Therapies tried without relief: acetaminophen (Tylenol) and NSAIDs  Therapies tried with relief: has never tried gabapentin          Accompanying Signs & Symptoms:  Risk of Fracture:  None  Risk of Cauda Equina:  None  Risk of Infection:  None  Risk of Cancer:  None         Amount of exercise or physical activity: has been trying to walk most days    Problems taking medications regularly: No - doing much better with diabetes medications    Medication side effects: none    Diet: carbohydrate counting    She is requesting a referral for breast reduction.  She has large pendulous breasts, current bra size is 44DD.  She has chronic neck and back pain (as above) that is exacerbated with large breasts and feels that a reduction would help alleviate her pain.     Stress/urge incontinence - worse.  UA was  normal last visit and discussed starting a medication at this visit - we started 2 different meds last and wanted to spread them out.      Problem list and histories reviewed & adjusted, as indicated.  Additional history: as documented    Patient Active Problem List   Diagnosis     Type 2 diabetes mellitus without complication, without long-term current use of insulin (H)     Benign essential hypertension     Anxiety     Mild recurrent major depression (H)     Post traumatic stress disorder     Diabetic neuropathy associated with type 2 diabetes mellitus (H)     Primary insomnia     Eczema     Vitamin D deficiency     Mixed incontinence urge and stress (male)(female)     Past Surgical History:   Procedure Laterality Date     HYSTERECTOMY         Social History   Substance Use Topics     Smoking status: Former Smoker     Smokeless tobacco: Never Used     Alcohol use Yes      Comment: rare     Family History   Problem Relation Age of Onset     Hypertension Mother      Coronary Artery Disease Father          Current Outpatient Prescriptions   Medication Sig Dispense Refill     VITAMIN D, CHOLECALCIFEROL, PO Take 2,000 Units by mouth daily       ibuprofen (ADVIL/MOTRIN) 800 MG tablet Take 1 tablet (800 mg) by mouth every 8 hours as needed for moderate pain 90 tablet 1     escitalopram (LEXAPRO) 20 MG tablet Take 1 tablet (20 mg) by mouth daily 30 tablet 5     oxybutynin (DITROPAN-XL) 10 MG 24 hr tablet Take 1 tablet (10 mg) by mouth daily 30 tablet 5     gabapentin (NEURONTIN) 300 MG capsule Take 1 tablet (300 mg) every night for 1-3 days, then 1 tablet twice daily for 1-3 days, then 1 tablet three times daily 90 capsule 0     vitamin D (ERGOCALCIFEROL) 76209 UNIT capsule Take one capsule by mouth every Monday, Wednesday and Friday 36 capsule 0     metFORMIN (GLUCOPHAGE-XR) 500 MG 24 hr tablet Take 1 tablet (500 mg) by mouth 2 times daily (with meals) 180 tablet 1     hydrOXYzine (ATARAX) 25 MG tablet Take 1-2 tablets  (25-50 mg) by mouth every 6 hours as needed for anxiety (or insomnia) 60 tablet 1     metoprolol (TOPROL-XL) 25 MG 24 hr tablet TAKE 1 TABLET BY MOUTH ROLA Y 30 tablet 5     potassium chloride SA (K-DUR/KLOR-CON M) 20 MEQ CR tablet TAKE 1 TABLET BY MOUTH EVER Y DAY 30 tablet 0     TRULICITY 1.5 MG/0.5ML pen INJECT 1.5 MG SUBCUTANEOUS EVERY 7 DAYS 6 mL 1     simvastatin (ZOCOR) 10 MG tablet TAKE 1 TABLET BY MOUTH NIGH TLY AT BEDTIME 90 tablet 2     blood glucose monitoring (ONE TOUCH ULTRA MINI) meter device kit Use to test blood sugars 2 times daily or as directed. 1 kit 0     blood glucose monitoring (NO BRAND SPECIFIED) test strip Use to test blood sugars 2 times daily or as directed 100 each 11     losartan (COZAAR) 25 MG tablet Take 2 tablets (50 mg) by mouth daily (Patient taking differently: Take 25 mg by mouth daily ) 30 tablet 3     [DISCONTINUED] escitalopram (LEXAPRO) 10 MG tablet Take 1 tablet (10 mg) by mouth daily 30 tablet 1     Allergies   Allergen Reactions     Celexa [Citalopram] Other (See Comments)     jittery     Lisinopril Cough     Recent Labs   Lab Test  04/20/17   1552  10/20/16   1046  01/20/16   1110   11/17/15   1229   A1C  7.6*  7.4*  7.1*   --    --    LDL  84  108*  91   --    --    HDL  51  51  50   --    --    TRIG  167*  84  100   --    --    ALT  41  41   --    --   46   CR  0.65  0.66  0.58   < >  0.71   GFRESTIMATED  >90  Non  GFR Calc    >90  Non  GFR Calc    >90  Non  GFR Calc     < >  89   GFRESTBLACK  >90   GFR Calc    >90   GFR Calc    >90   GFR Calc     < >  >90   GFR Calc     POTASSIUM  4.1  4.2  4.2   < >  3.0*   TSH  1.14  1.61  1.72   --    --     < > = values in this interval not displayed.      BP Readings from Last 3 Encounters:   05/04/17 138/80   04/20/17 136/88   10/20/16 138/90    Wt Readings from Last 3 Encounters:   05/04/17 182 lb 3.2 oz (82.6 kg)    04/20/17 170 lb (77.1 kg)   10/20/16 187 lb (84.8 kg)                    Reviewed and updated as needed this visit by clinical staff  Tobacco  Allergies  Meds  Med Hx  Surg Hx  Fam Hx  Soc Hx      Reviewed and updated as needed this visit by Provider         ROS:  Constitutional, HEENT, cardiovascular, pulmonary, gi and gu systems are negative, except as otherwise noted.    OBJECTIVE:                                                    /80 (BP Location: Right arm, Patient Position: Chair, Cuff Size: Adult Large)  Pulse 80  Temp 98.2  F (36.8  C) (Tympanic)  Resp 14  Ht 5' (1.524 m)  Wt 182 lb 3.2 oz (82.6 kg)  BMI 35.58 kg/m2  Body mass index is 35.58 kg/(m^2).  GENERAL: healthy, alert and no distress  NECK: no adenopathy, no asymmetry, masses, or scars and thyroid normal to palpation  RESP: lungs clear to auscultation - no rales, rhonchi or wheezes  CV: regular rate and rhythm, normal S1 S2, no S3 or S4, no murmur, click or rub, no peripheral edema and peripheral pulses strong         ASSESSMENT/PLAN:                                                        1. Mild recurrent major depression (H)  chronic    2. Anxiety  - increase escitalopram (LEXAPRO) 20 MG tablet; Take 1 tablet (20 mg) by mouth daily  Dispense: 30 tablet; Refill: 5  - gabapentin (NEURONTIN) 300 MG capsule; Take 1 tablet (300 mg) every night for 1-3 days, then 1 tablet twice daily for 1-3 days, then 1 tablet three times daily  Dispense: 90 capsule; Refill: 0    3. Mixed incontinence urge and stress (male)(female)  - start oxybutynin (DITROPAN-XL) 10 MG 24 hr tablet; Take 1 tablet (10 mg) by mouth daily  Dispense: 30 tablet; Refill: 5    4. Diabetic polyneuropathy associated with type 2 diabetes mellitus (H)  chronic  - gabapentin (NEURONTIN) 300 MG capsule; Take 1 tablet (300 mg) every night for 1-3 days, then 1 tablet twice daily for 1-3 days, then 1 tablet three times daily  Dispense: 90 capsule; Refill: 0    5. Chronic  bilateral low back pain with sciatica, sciatica laterality unspecified  chronic  - gabapentin (NEURONTIN) 300 MG capsule; Take 1 tablet (300 mg) every night for 1-3 days, then 1 tablet twice daily for 1-3 days, then 1 tablet three times daily  Dispense: 90 capsule; Refill: 0  - PLASTIC SURGERY REFERRAL - discuss breast reduction    6. Cervicalgia  chronic  - PLASTIC SURGERY REFERRAL as above    FUTURE APPOINTMENTS:       - Follow-up visit in 1 month - or as needed for acute concerns.     Daja Obrien, NP  [facility]

## 2017-05-05 ASSESSMENT — ANXIETY QUESTIONNAIRES: GAD7 TOTAL SCORE: 9

## 2017-05-05 ASSESSMENT — PATIENT HEALTH QUESTIONNAIRE - PHQ9: SUM OF ALL RESPONSES TO PHQ QUESTIONS 1-9: 14

## 2017-05-11 DIAGNOSIS — E87.6 HYPOKALEMIA: ICD-10-CM

## 2017-05-11 RX ORDER — POTASSIUM CHLORIDE 1500 MG/1
TABLET, EXTENDED RELEASE ORAL
Qty: 30 TABLET | Refills: 5 | Status: SHIPPED | OUTPATIENT
Start: 2017-05-11 | End: 2017-12-28

## 2017-06-14 DIAGNOSIS — E11.43 TYPE 2 DIABETES MELLITUS WITH AUTONOMIC NEUROPATHY (H): ICD-10-CM

## 2017-06-14 NOTE — TELEPHONE ENCOUNTER
Trulicity 1.5mg/0.5ml         Last Written Prescription Date: 5/26/17  Last Fill Quantity: 6ml, # refills: 0  Last Office Visit with G, [facility] or [facility] prescribing provider:  5/04/17        BP Readings from Last 3 Encounters:   05/04/17 138/80   04/20/17 136/88   10/20/16 138/90     Lab Results   Component Value Date    MICROL 23 10/20/2016     Lab Results   Component Value Date    UMALCR 30.54 10/20/2016     Creatinine   Date Value Ref [facility] Status   04/20/2017 0.65 0.52 - 1.04 mg/dL Final   ]  GFR Estimate   Date Value Ref Range Status   04/20/2017 >90  Non  GFR Calc   >60 mL/min/1.7m2 Final   10/20/2016 >90  Non  GFR Calc   >60 mL/min/1.7m2 Final   01/20/2016 >90  Non  GFR Calc   >60 mL/min/1.7m2 Final     GFR Estimate If Black   Date Value Ref [facility] Status   04/20/2017 >90   GFR Calc   >60 mL/min/1.7m2 Final   10/20/2016 >90   GFR Calc   >60 mL/min/1.7m2 Final   01/20/2016 >90   GFR Calc   >60 mL/min/1.7m2 Final     Lab Results   Component Value Date    CHOL 168 04/20/2017     Lab Results   Component Value Date    HDL 51 04/20/2017     Lab Results   Component Value Date    LDL 84 04/20/2017     Lab Results   Component Value Date    TRIG 167 04/20/2017     Lab Results   Component Value Date    CHOLHDLRATIO 3.2 10/15/2015     Lab Results   Component Value Date    AST 24 04/20/2017     Lab Results   Component Value Date    ALT 41 04/20/2017     Lab Results   Component Value Date    A1C 7.6 04/20/2017    A1C 7.4 10/20/2016    A1C 7.1 01/20/2016    A1C 7.6 10/15/2015     Potassium   Date Value Ref [facility] Status   04/20/2017 4.1 3.4 - 5.3 mmol/L Final

## 2017-06-19 RX ORDER — DULAGLUTIDE 1.5 MG/.5ML
INJECTION, SOLUTION SUBCUTANEOUS
Qty: 6 ML | Refills: 1 | Status: SHIPPED | OUTPATIENT
Start: 2017-06-19 | End: 2017-08-14 | Stop reason: ALTCHOICE

## 2017-06-20 ENCOUNTER — TRANSFERRED RECORDS (OUTPATIENT)
Dept: HEALTH INFORMATION MANAGEMENT | Facility: HOSPITAL | Age: 47
End: 2017-06-20

## 2017-06-20 LAB
CREAT SERPL-MCNC: 0.75 MG/DL (ref 0.4–1)
GLUCOSE SERPL-MCNC: 131 MG/DL (ref 70–100)
POTASSIUM SERPL-SCNC: 3.8 MEQ/L (ref 3.4–5.1)

## 2017-06-23 ENCOUNTER — TELEPHONE (OUTPATIENT)
Dept: FAMILY MEDICINE | Facility: OTHER | Age: 47
End: 2017-06-23

## 2017-06-23 NOTE — TELEPHONE ENCOUNTER
Talked with [facility] had received a notice from [employer] that medication is covered under plan until 01/14/2018.  And the plan has entered a manual override to allow payment. Pharmacist stated disregard this one and if it doesn't go through with refill will send another PA  Pamela M Lechevalier LPN

## 2017-07-17 ENCOUNTER — TELEPHONE (OUTPATIENT)
Dept: FAMILY MEDICINE | Facility: OTHER | Age: 47
End: 2017-07-17

## 2017-07-17 NOTE — TELEPHONE ENCOUNTER
Reason for call:  Medication    1. Medication Name? unknown  2. Is this request for a refill? No  3. What Pharmacy do you use? [facility]  4. Have you contacted your pharmacy? No    5. If yes, when?  (Please note that the turn-around-time for prescriptions is 72 business hours; I am sending your request at this time. SEND TO  [facility]  )  Description: Pt thinks she has a yeast infection and would like to know if Jayce Encarnacion will prescribe something without her having to come in. Patient has to go to work at 12:00 and would like to know if it can be done before then.  Was an appointment offered for this a call? No   Preferred method for responding to this messageTelephone Call - [phone]  If we cannot reach you directly, may we leave a detailed response at the number you provided? Yes  Can this message wait until your PCP/Provider returns if not available today? N/a provider is in today

## 2017-07-18 NOTE — TELEPHONE ENCOUNTER
Called pt, needed somethng for a yeast infection, states today has apparently cleared.  Not needed

## 2017-08-03 DIAGNOSIS — I10 BENIGN ESSENTIAL HYPERTENSION: ICD-10-CM

## 2017-08-03 NOTE — TELEPHONE ENCOUNTER
Pt of [facility] Cozaar last filled on 7.16.15 # 30 with 3 refills. Pharmacy request for 50 mg tablet. On Epic medication list states pt taking differently (25mg). Last office visit on 5.4.17. Medication pended as prescribed.     Vital Signs 10/20/2016 4/20/2017 4/20/2017 5/4/2017 5/4/2017   Systolic 138  136  138   Diastolic 90  88  80   Pulse 84  107  80       Thank you.

## 2017-08-04 RX ORDER — LOSARTAN POTASSIUM 25 MG/1
25 TABLET ORAL DAILY
Qty: 30 TABLET | Refills: 0 | Status: SHIPPED | OUTPATIENT
Start: 2017-08-04 | End: 2017-08-14

## 2017-08-14 ENCOUNTER — OFFICE VISIT (OUTPATIENT)
Dept: FAMILY MEDICINE | Facility: OTHER | Age: 47
End: 2017-08-14
Attending: NURSE PRACTITIONER
Payer: COMMERCIAL

## 2017-08-14 VITALS
HEIGHT: 60 IN | RESPIRATION RATE: 18 BRPM | BODY MASS INDEX: 34 KG/M2 | TEMPERATURE: 97.5 F | WEIGHT: 173.2 LBS | DIASTOLIC BLOOD PRESSURE: 98 MMHG | HEART RATE: 109 BPM | SYSTOLIC BLOOD PRESSURE: 144 MMHG | OXYGEN SATURATION: 98 %

## 2017-08-14 DIAGNOSIS — G44.209 TENSION HEADACHE: ICD-10-CM

## 2017-08-14 DIAGNOSIS — E11.42 DIABETIC POLYNEUROPATHY ASSOCIATED WITH TYPE 2 DIABETES MELLITUS (H): Primary | ICD-10-CM

## 2017-08-14 DIAGNOSIS — E78.5 HYPERLIPIDEMIA LDL GOAL <100: ICD-10-CM

## 2017-08-14 DIAGNOSIS — I10 BENIGN ESSENTIAL HYPERTENSION: ICD-10-CM

## 2017-08-14 DIAGNOSIS — E11.9 TYPE 2 DIABETES MELLITUS WITHOUT COMPLICATION, WITHOUT LONG-TERM CURRENT USE OF INSULIN (H): ICD-10-CM

## 2017-08-14 DIAGNOSIS — Z79.899 ON STATIN THERAPY: ICD-10-CM

## 2017-08-14 DIAGNOSIS — F41.9 ANXIETY: ICD-10-CM

## 2017-08-14 DIAGNOSIS — N39.46 MIXED INCONTINENCE URGE AND STRESS (MALE)(FEMALE): ICD-10-CM

## 2017-08-14 DIAGNOSIS — F33.0 MILD RECURRENT MAJOR DEPRESSION (H): ICD-10-CM

## 2017-08-14 LAB
ALBUMIN SERPL-MCNC: 3.8 G/DL (ref 3.4–5)
ALBUMIN UR-MCNC: ABNORMAL MG/DL
ALP SERPL-CCNC: 100 U/L (ref 40–150)
ALT SERPL W P-5'-P-CCNC: 77 U/L (ref 0–50)
ANION GAP SERPL CALCULATED.3IONS-SCNC: 9 MMOL/L (ref 3–14)
APPEARANCE UR: CLEAR
AST SERPL W P-5'-P-CCNC: 39 U/L (ref 0–45)
BACTERIA #/AREA URNS HPF: ABNORMAL /HPF
BILIRUB DIRECT SERPL-MCNC: 0.2 MG/DL (ref 0–0.2)
BILIRUB SERPL-MCNC: 0.7 MG/DL (ref 0.2–1.3)
BILIRUB UR QL STRIP: NEGATIVE
BUN SERPL-MCNC: 10 MG/DL (ref 7–30)
CALCIUM SERPL-MCNC: 8.6 MG/DL (ref 8.5–10.1)
CHLORIDE SERPL-SCNC: 103 MMOL/L (ref 94–109)
CHOLEST SERPL-MCNC: 167 MG/DL
CO2 SERPL-SCNC: 26 MMOL/L (ref 20–32)
COLOR UR AUTO: YELLOW
CREAT SERPL-MCNC: 0.54 MG/DL (ref 0.52–1.04)
EST. AVERAGE GLUCOSE BLD GHB EST-MCNC: 197 MG/DL
GFR SERPL CREATININE-BSD FRML MDRD: ABNORMAL ML/MIN/1.7M2
GLUCOSE SERPL-MCNC: 193 MG/DL (ref 70–99)
GLUCOSE UR STRIP-MCNC: NEGATIVE MG/DL
HBA1C MFR BLD: 8.5 % (ref 4.3–6)
HDLC SERPL-MCNC: 54 MG/DL
HGB UR QL STRIP: ABNORMAL
KETONES UR STRIP-MCNC: NEGATIVE MG/DL
LDLC SERPL CALC-MCNC: 97 MG/DL
LEUKOCYTE ESTERASE UR QL STRIP: NEGATIVE
NITRATE UR QL: NEGATIVE
NON-SQ EPI CELLS #/AREA URNS LPF: ABNORMAL /LPF
NONHDLC SERPL-MCNC: 113 MG/DL
PH UR STRIP: 5.5 PH (ref 5–7)
POTASSIUM SERPL-SCNC: 4 MMOL/L (ref 3.4–5.3)
PROT SERPL-MCNC: 8.1 G/DL (ref 6.8–8.8)
RBC #/AREA URNS AUTO: ABNORMAL /HPF (ref 0–2)
SODIUM SERPL-SCNC: 138 MMOL/L (ref 133–144)
SP GR UR STRIP: 1.02 (ref 1–1.03)
T4 FREE SERPL-MCNC: 1.15 NG/DL (ref 0.76–1.46)
TRIGL SERPL-MCNC: 78 MG/DL
TSH SERPL DL<=0.005 MIU/L-ACNC: 0.37 MU/L (ref 0.4–4)
URN SPEC COLLECT METH UR: ABNORMAL
UROBILINOGEN UR STRIP-ACNC: 0.2 EU/DL (ref 0.2–1)
WBC #/AREA URNS AUTO: ABNORMAL /HPF (ref 0–2)

## 2017-08-14 PROCEDURE — 80048 BASIC METABOLIC PNL TOTAL CA: CPT | Mod: ZL | Performed by: NURSE PRACTITIONER

## 2017-08-14 PROCEDURE — 84443 ASSAY THYROID STIM HORMONE: CPT | Mod: ZL | Performed by: NURSE PRACTITIONER

## 2017-08-14 PROCEDURE — 36415 COLL VENOUS BLD VENIPUNCTURE: CPT | Mod: ZL | Performed by: NURSE PRACTITIONER

## 2017-08-14 PROCEDURE — 40000788 ZZHCL STATISTIC ESTIMATED AVERAGE GLUCOSE: Mod: ZL | Performed by: NURSE PRACTITIONER

## 2017-08-14 PROCEDURE — 80076 HEPATIC FUNCTION PANEL: CPT | Mod: ZL | Performed by: NURSE PRACTITIONER

## 2017-08-14 PROCEDURE — 84439 ASSAY OF FREE THYROXINE: CPT | Mod: ZL | Performed by: NURSE PRACTITIONER

## 2017-08-14 PROCEDURE — 99214 OFFICE O/P EST MOD 30 MIN: CPT | Performed by: NURSE PRACTITIONER

## 2017-08-14 PROCEDURE — 80061 LIPID PANEL: CPT | Mod: ZL | Performed by: NURSE PRACTITIONER

## 2017-08-14 PROCEDURE — 83036 HEMOGLOBIN GLYCOSYLATED A1C: CPT | Mod: ZL | Performed by: NURSE PRACTITIONER

## 2017-08-14 PROCEDURE — 81001 URINALYSIS AUTO W/SCOPE: CPT | Mod: ZL | Performed by: NURSE PRACTITIONER

## 2017-08-14 PROCEDURE — 99212 OFFICE O/P EST SF 10 MIN: CPT

## 2017-08-14 RX ORDER — LOSARTAN POTASSIUM 50 MG/1
50 TABLET ORAL DAILY
Qty: 90 TABLET | Refills: 1 | Status: SHIPPED | OUTPATIENT
Start: 2017-08-14 | End: 2017-11-20

## 2017-08-14 RX ORDER — IBUPROFEN 800 MG/1
800 TABLET, FILM COATED ORAL EVERY 8 HOURS PRN
Qty: 90 TABLET | Refills: 1 | Status: SHIPPED | OUTPATIENT
Start: 2017-08-14 | End: 2017-11-28

## 2017-08-14 RX ORDER — BLOOD-GLUCOSE METER
KIT MISCELLANEOUS
Qty: 1 KIT | Refills: 0 | Status: SHIPPED | OUTPATIENT
Start: 2017-08-14 | End: 2019-01-23

## 2017-08-14 ASSESSMENT — ANXIETY QUESTIONNAIRES
6. BECOMING EASILY ANNOYED OR IRRITABLE: SEVERAL DAYS
7. FEELING AFRAID AS IF SOMETHING AWFUL MIGHT HAPPEN: MORE THAN HALF THE DAYS
5. BEING SO RESTLESS THAT IT IS HARD TO SIT STILL: MORE THAN HALF THE DAYS
3. WORRYING TOO MUCH ABOUT DIFFERENT THINGS: MORE THAN HALF THE DAYS
2. NOT BEING ABLE TO STOP OR CONTROL WORRYING: MORE THAN HALF THE DAYS
IF YOU CHECKED OFF ANY PROBLEMS ON THIS QUESTIONNAIRE, HOW DIFFICULT HAVE THESE PROBLEMS MADE IT FOR YOU TO DO YOUR WORK, TAKE CARE OF THINGS AT HOME, OR GET ALONG WITH OTHER PEOPLE: SOMEWHAT DIFFICULT
1. FEELING NERVOUS, ANXIOUS, OR ON EDGE: MORE THAN HALF THE DAYS
GAD7 TOTAL SCORE: 13
4. TROUBLE RELAXING: MORE THAN HALF THE DAYS

## 2017-08-14 ASSESSMENT — PATIENT HEALTH QUESTIONNAIRE - PHQ9: SUM OF ALL RESPONSES TO PHQ QUESTIONS 1-9: 14

## 2017-08-14 ASSESSMENT — PAIN SCALES - GENERAL: PAINLEVEL: EXTREME PAIN (9)

## 2017-08-14 NOTE — NURSING NOTE
Chief Complaint   Patient presents with     Nausea     Headache       Initial BP (!) 148/98 (BP Location: Left arm, Patient Position: Chair, Cuff Size: Adult Large)  Pulse 109  Temp 97.5  F (36.4  C) (Tympanic)  Resp 18  Ht 5' (1.524 m)  Wt 173 lb 3.2 oz (78.6 kg)  SpO2 98%  BMI 33.83 kg/m2 Estimated body mass index is 33.83 kg/(m^2) as calculated from the following:    Height as of this encounter: 5' (1.524 m).    Weight as of this encounter: 173 lb 3.2 oz (78.6 kg).  Medication Reconciliation: complete   Pamela M Lechevalier LPN

## 2017-08-14 NOTE — MR AVS SNAPSHOT
After Visit Summary   8/14/2017    Lili Nava    MRN: [id number]           Patient Information     Date Of Birth          1970        Visit Information        Provider Department      8/14/2017 10:00 AM Daja Obrien NP [facility]        Today's Diagnoses     Diabetic polyneuropathy associated with type 2 diabetes mellitus (H)    -  1    Type 2 diabetes mellitus without complication, without long-term current use of insulin (H)        Benign essential hypertension        Mixed incontinence urge and stress (male)(female)        Anxiety        Mild recurrent major depression (H)        Hyperlipidemia LDL goal <100        On statin therapy        Tension headache          Care Instructions      ASSESSMENT/PLAN:       1. Diabetic polyneuropathy associated with type 2 diabetes mellitus (H)    2. Type 2 diabetes mellitus without complication, without long-term current use of insulin (H)  Chronic  - stop trulicity  - blood glucose monitoring (ONE TOUCH ULTRA MINI) meter device kit; Use to test blood sugars 2 times daily or as directed.  Dispense: 1 kit; Refill: 0  - blood glucose monitoring (NO BRAND SPECIFIED) test strip; Use to test blood sugars 2 times daily or as directed  Dispense: 100 each; Refill: 11  - blood glucose (NO BRAND SPECIFIED) lancets standard; Use to test blood sugar once times daily or as directed.  Dispense: 100 each; Refill: 11  - start insulin glargine (LANTUS SOLOSTAR) 100 UNIT/ML injection; Inject 10 Units Subcutaneous At Bedtime  Dispense: 15 mL; Refill: 1  - continue to check glucose levels, if fasting readings are over 120, increase lantus by 2 units every three days until those fasting readings are around 120.    - insulin pen needle 31G X 6 MM; Use 1 daily or as directed.  Dispense: 100 each; Refill: prn  - Hemoglobin A1c    3. Benign essential hypertension  uncontrolled  - increase losartan (COZAAR) 50 MG tablet; Take 1 tablet (50 mg) by  mouth daily  Dispense: 90 tablet; Refill: 1  - continue metoprolol 25mg daily  - TSH with free T4 reflex  - Basic metabolic panel  - blood pressure monitor - check Blood pressure at home 3 times a week.      4. Mixed incontinence urge and stress (male)(female)  UA today, if no improvement will change to vesicare.     5. Anxiety  Continue current plan    6. Mild recurrent major depression (H)  As above    7. Hyperlipidemia LDL goal <100  - Lipid Profile    8. On statin therapy  - Hepatic panel    9. Tension headache  chronic  - ibuprofen (ADVIL/MOTRIN) 800 MG tablet; Take 1 tablet (800 mg) by mouth every 8 hours as needed for moderate pain  Dispense: 90 tablet; Refill: 1      FUTURE APPOINTMENTS:       - Follow-up visit in 1 month or as needed for acute concern.     Daja Obrien NP  [facility]          Follow-ups after your visit        Your next 10 appointments already scheduled     Sep 14, 2017 10:15 AM CDT   (Arrive by 10:00 AM)   SHORT with Daja Obrien NP   [facility] ([facility] )    [address] [address]   [phone]              Who to contact     If you have questions or need follow up information about today's clinic visit or your schedule please contact [facility] directly at [phone].  Normal or non-critical lab and imaging results will be communicated to you by MyChart, letter or phone within 4 business days after the clinic has received the results. If you do not hear from us within 7 days, please contact the clinic through MyChart or phone. If you have a critical or abnormal lab result, we will notify you by phone as soon as possible.  Submit refill requests through [employer] or call your pharmacy and they will forward the refill request to us. Please allow 3 business days for your refill to be completed.          Additional Information About Your Visit        MyChart Information      [employer] gives you secure access to your electronic health record. If you see a primary care provider, you can also send messages to your care team and make appointments. If you have questions, please call your primary care clinic.  If you do not have a primary care provider, please call [phone] and they will assist you.        Care EveryWhere ID     This is your Care EveryWhere ID. This could be used by other organizations to access your [facility] medical records  [id number]        Your Vitals Were     Pulse Temperature Respirations Height Pulse Oximetry BMI (Body Mass Index)    109 97.5  F (36.4  C) (Tympanic) 18 5' (1.524 m) 98% 33.83 kg/m2       Blood Pressure from Last 3 Encounters:   08/14/17 (!) 144/98   05/04/17 138/80   04/20/17 136/88    Weight from Last 3 Encounters:   08/14/17 173 lb 3.2 oz (78.6 kg)   05/04/17 182 lb 3.2 oz (82.6 kg)   04/20/17 170 lb (77.1 kg)              We Performed the Following     *UA reflex to Microscopic and Culture - [facility]     Basic metabolic panel     Hemoglobin A1c     Hepatic panel     Lipid Profile     TSH with free T4 reflex          Today's Medication Changes          These changes are accurate as of: 8/14/17 11:02 AM.  If you have any questions, ask your nurse or doctor.               Start taking these medicines.        Dose/Directions    blood glucose lancets standard   Commonly known as:  no brand specified   Used for:  Type 2 diabetes mellitus without complication, without long-term current use of insulin (H)   Started by:  Daja Obrien NP        Use to test blood sugar once times daily or as directed.   Quantity:  100 each   Refills:  11       insulin glargine 100 UNIT/ML injection   Commonly known as:  LANTUS SOLOSTAR   Used for:  Type 2 diabetes mellitus without complication, without long-term current use of insulin (H)   Started by:  Daja Obrien NP        Dose:  10 Units   Inject 10 Units Subcutaneous At Bedtime    Quantity:  15 mL   Refills:  1       insulin pen needle 31G X 6 MM   Used for:  Type 2 diabetes mellitus without complication, without long-term current use of insulin (H)   Started by:  Daja Obrien NP        Use 1 daily or as directed.   Quantity:  100 each   Refills:  prn       order for DME   Used for:  Benign essential hypertension   Started by:  Daja Obrien NP        Blood pressure monitor   Quantity:  1 each   Refills:  0         These medicines have changed or have updated prescriptions.        Dose/Directions    losartan 50 MG tablet   Commonly known as:  COZAAR   This may have changed:    - medication strength  - how much to take   Used for:  Benign essential hypertension   Changed by:  Daja Obrien NP        Dose:  50 mg   Take 1 tablet (50 mg) by mouth daily   Quantity:  90 tablet   Refills:  1         Stop taking these medicines if you haven't already. Please contact your care team if you have questions.     TRULICITY 1.5 MG/0.5ML pen   Generic drug:  dulaglutide   Stopped by:  Daja Obrien NP                Where to get your medicines      These medications were sent to [facility] - [place], [place] - [address] [place] [id number]     Phone:  [phone]     blood glucose lancets standard    blood glucose monitoring meter device kit    blood glucose monitoring test strip    ibuprofen 800 MG tablet    insulin glargine 100 UNIT/ML injection    insulin pen needle 31G X 6 MM    losartan 50 MG tablet         Some of these will need a paper prescription and others can be bought over the counter.  Ask your nurse if you have questions.     Bring a paper prescription for each of these medications     order for DME                Primary Care Provider Office Phone # Fax #    Daja Obrien [phone]       [facility] [address] [place] [address] [address] [place] [address]        Equal Access to  Services     [facility]: Hadii aad ku haddeidrajoaquina Ellainga, waaxda luqadaha, qaybta kaalmada gale, soraida duffy . So [facility] [phone].    ATENCIÓN: Si habla español, tiene a mullins disposición servicios gratuitos de asistencia lingüística. Llame al [phone].    We comply with applicable federal civil rights laws and [place] laws. We do not discriminate on the basis of race, color, national origin, age, disability sex, sexual orientation or gender identity.            Thank you!     Thank you for choosing [facility]  for your care. Our goal is always to provide you with excellent care. Hearing back from our patients is one way we can continue to improve our services. Please take a few minutes to complete the written survey that you may receive in the mail after your visit with us. Thank you!             Your Updated Medication List - Protect others around you: Learn how to safely use, store and throw away your medicines at www.disposemymeds.org.          This list is accurate as of: 8/14/17 11:02 AM.  Always use your most recent med list.                   Brand Name Dispense Instructions for use Diagnosis    blood glucose lancets standard    no brand specified    100 each    Use to test blood sugar once times daily or as directed.    Type 2 diabetes mellitus without complication, without long-term current use of insulin (H)       blood glucose monitoring meter device kit     1 kit    Use to test blood sugars 2 times daily or as directed.    Type 2 diabetes mellitus without complication, without long-term current use of insulin (H)       blood glucose monitoring test strip    no brand specified    100 each    Use to test blood sugars 2 times daily or as directed    Type 2 diabetes mellitus without complication, without long-term current use of insulin (H)       escitalopram 20 MG tablet    LEXAPRO    30 tablet    Take 1 tablet (20 mg) by mouth daily    Anxiety        gabapentin 300 MG capsule    NEURONTIN    90 capsule    Take 1 tablet (300 mg) every night for 1-3 days, then 1 tablet twice daily for 1-3 days, then 1 tablet three times daily    Anxiety, Diabetic polyneuropathy associated with type 2 diabetes mellitus (H), Chronic bilateral low back pain with sciatica, sciatica laterality unspecified       hydrOXYzine 25 MG tablet    ATARAX    60 tablet    Take 1-2 tablets (25-50 mg) by mouth every 6 hours as needed for anxiety (or insomnia)    Anxiety       ibuprofen 800 MG tablet    ADVIL/MOTRIN    90 tablet    Take 1 tablet (800 mg) by mouth every 8 hours as needed for moderate pain    Tension headache       insulin glargine 100 UNIT/ML injection    LANTUS SOLOSTAR    15 mL    Inject 10 Units Subcutaneous At Bedtime    Type 2 diabetes mellitus without complication, without long-term current use of insulin (H)       insulin pen needle 31G X 6 MM     100 each    Use 1 daily or as directed.    Type 2 diabetes mellitus without complication, without long-term current use of insulin (H)       losartan 50 MG tablet    COZAAR    90 tablet    Take 1 tablet (50 mg) by mouth daily    Benign essential hypertension       metFORMIN 500 MG 24 hr tablet    GLUCOPHAGE-XR    180 tablet    Take 1 tablet (500 mg) by mouth 2 times daily (with meals)    Type 2 diabetes mellitus without complication, without long-term current use of insulin (H)       metoprolol 25 MG 24 hr tablet    TOPROL-XL    30 tablet    TAKE 1 TABLET BY MOUTH ROLA Y    Benign essential hypertension       order for DME     1 each    Blood pressure monitor    Benign essential hypertension       oxybutynin 10 MG 24 hr tablet    DITROPAN-XL    30 tablet    Take 1 tablet (10 mg) by mouth daily    Mixed incontinence urge and stress (male)(female)       potassium chloride SA 20 MEQ CR tablet    K-DUR/KLOR-CON M    30 tablet    TAKE 1 TABLET BY MOUTH EVER Y DAY    Hypokalemia       simvastatin 10 MG tablet    ZOCOR    90 tablet    TAKE  1 TABLET BY MOUTH JEM MAHONEY AT BEDTIME    Type 2 diabetes mellitus without complication (H)       VITAMIN D (CHOLECALCIFEROL) PO      Take 2,000 Units by mouth daily        vitamin D 15142 UNIT capsule    ERGOCALCIFEROL    36 capsule    Take one capsule by mouth every Monday, Wednesday and Friday    Vitamin D deficiency

## 2017-08-14 NOTE — PROGRESS NOTES
"  SUBJECTIVE:                                                    Lili Nava is a 46 year old female who presents to clinic today for the following health issues:  Nausea headache and blood pressure check    Hypertension Follow-up      Outpatient blood pressures are not being checked.    Low Salt Diet: low salt    Migraine Follow-Up    Headaches symptoms:  Stable at the same as before    Frequency: daily     Duration of headaches: 1/2 day    Able to do normal daily activities/work with migraines: Yes    Rescue/Relief medication:Tylenol              Effectiveness: intermittent relief    Preventative medication: None    Neurologic complications: No new stroke-like symptoms, loss of vision or speech, numbness or weakness    In the past 4 weeks, how often have you gone to Urgent Care or the emergency room because of your headaches?  1          Amount of exercise or physical activity: 4-5 days/week for an average of 45-60 minutes    Problems taking medications regularly: No    Medication side effects: yes - nausea, vomiting, with GI upset that is getting worse the longer she is on trulicity.    Diet: low salt and low fat/cholesterol      Diabetes Follow-up    Patient is checking blood sugars: once daily.  Results are as follows:       am - 145 today - no meter or log for review.   States needs a new meter, hers is \"acting weird.\"        Diabetic concerns: None     Symptoms of hypoglycemia (low blood sugar): none     Paresthesias (numbness or burning in feet) or sores: Yes stable     Date of last diabetic eye exam: current    Hyperlipidemia Follow-Up      Rate your low fat/cholesterol diet?: good    Taking statin?  Yes, no muscle aches from statin  Other lipid medications/supplements?:  none    STRESS URGE INCONTINENCE: symptoms are unchanged on oxybutynin.      Problem list and histories reviewed & adjusted, as indicated.  Additional history: as documented    Patient Active Problem List   Diagnosis     Type 2 " diabetes mellitus without complication, without long-term current use of insulin (H)     Benign essential hypertension     Anxiety     Mild recurrent major depression (H)     Post traumatic stress disorder     Diabetic neuropathy associated with type 2 diabetes mellitus (H)     Primary insomnia     Eczema     Vitamin D deficiency     Mixed incontinence urge and stress (male)(female)     Past Surgical History:   Procedure Laterality Date     COLONOSCOPY  01/01/2013     HYSTERECTOMY         Social History   Substance Use Topics     Smoking status: Former Smoker     Smokeless tobacco: Never Used     Alcohol use Yes      Comment: rare     Family History   Problem Relation Age of Onset     Hypertension Mother      Coronary Artery Disease Father          Current Outpatient Prescriptions   Medication Sig Dispense Refill     losartan (COZAAR) 25 MG tablet Take 1 tablet (25 mg) by mouth daily 30 tablet 0     TRULICITY 1.5 MG/0.5ML pen INJECT 1.5 MG SUBCUTANEOUS EVERY 7 DAYS 6 mL 1     potassium chloride SA (K-DUR/KLOR-CON M) 20 MEQ CR tablet TAKE 1 TABLET BY MOUTH EVER Y DAY 30 tablet 5     VITAMIN D, CHOLECALCIFEROL, PO Take 2,000 Units by mouth daily       ibuprofen (ADVIL/MOTRIN) 800 MG tablet Take 1 tablet (800 mg) by mouth every 8 hours as needed for moderate pain 90 tablet 1     escitalopram (LEXAPRO) 20 MG tablet Take 1 tablet (20 mg) by mouth daily 30 tablet 5     oxybutynin (DITROPAN-XL) 10 MG 24 hr tablet Take 1 tablet (10 mg) by mouth daily 30 tablet 5     gabapentin (NEURONTIN) 300 MG capsule Take 1 tablet (300 mg) every night for 1-3 days, then 1 tablet twice daily for 1-3 days, then 1 tablet three times daily 90 capsule 0     vitamin D (ERGOCALCIFEROL) 06530 UNIT capsule Take one capsule by mouth every Monday, Wednesday and Friday 36 capsule 0     metFORMIN (GLUCOPHAGE-XR) 500 MG 24 hr tablet Take 1 tablet (500 mg) by mouth 2 times daily (with meals) 180 tablet 1     hydrOXYzine (ATARAX) 25 MG tablet Take 1-2  tablets (25-50 mg) by mouth every 6 hours as needed for anxiety (or insomnia) 60 tablet 1     metoprolol (TOPROL-XL) 25 MG 24 hr tablet TAKE 1 TABLET BY MOUTH ROLA Y 30 tablet 5     simvastatin (ZOCOR) 10 MG tablet TAKE 1 TABLET BY MOUTH JEM TLY AT BEDTIME 90 tablet 2     blood glucose monitoring (ONE TOUCH ULTRA MINI) meter device kit Use to test blood sugars 2 times daily or as directed. 1 kit 0     blood glucose monitoring (NO BRAND SPECIFIED) test strip Use to test blood sugars 2 times daily or as directed 100 each 11     Allergies   Allergen Reactions     Celexa [Citalopram] Other (See Comments)     jittery     Lisinopril Cough     Recent Labs   Lab Test 06/20/17 04/20/17   1552  10/20/16   1046  01/20/16   1110   11/17/15   1229   A1C   --   7.6*  7.4*  7.1*   --    --    LDL   --   84  108*  91   --    --    HDL   --   51  51  50   --    --    TRIG   --   167*  84  100   --    --    ALT   --   41  41   --    --   46   CR  0.75  0.65  0.66  0.58   < >  0.71   GFRESTIMATED   --   >90  Non  GFR Calc    >90  Non  GFR Calc    >90  Non  GFR Calc     < >  89   GFRESTBLACK   --   >90   GFR Calc    >90   GFR Calc    >90   GFR Calc     < >  >90   GFR Calc     POTASSIUM  3.8  4.1  4.2  4.2   < >  3.0*   TSH   --   1.14  1.61  1.72   --    --     < > = values in this interval not displayed.      BP Readings from Last 3 Encounters:   08/14/17 (!) 148/98   05/04/17 138/80   04/20/17 136/88    Wt Readings from Last 3 Encounters:   08/14/17 173 lb 3.2 oz (78.6 kg)   05/04/17 182 lb 3.2 oz (82.6 kg)   04/20/17 170 lb (77.1 kg)                          Reviewed and updated as needed this visit by clinical staffTobacco  Allergies  Meds  Problems       Reviewed and updated as needed this visit by Provider         ROS:  Constitutional, HEENT, cardiovascular, pulmonary, gi and gu systems are negative, except as  otherwise noted.      OBJECTIVE:   BP (!) 148/98 (BP Location: Left arm, Patient Position: Chair, Cuff Size: Adult Large)  Pulse 109  Temp 97.5  F (36.4  C) (Tympanic)  Resp 18  Ht 5' (1.524 m)  Wt 173 lb 3.2 oz (78.6 kg)  SpO2 98%  BMI 33.83 kg/m2  Body mass index is 33.83 kg/(m^2).  GENERAL: healthy, alert and no distress  NECK: no adenopathy, no asymmetry, masses, or scars, thyroid normal to palpation and no carotid bruits  RESP: lungs clear to auscultation - no rales, rhonchi or wheezes  CV: regular rate and rhythm, normal S1 S2, no S3 or S4, no murmur, click or rub, no peripheral edema and peripheral pulses strong  ABDOMEN: soft, nontender, no hepatosplenomegaly, no masses and bowel sounds normal  MS: no gross musculoskeletal defects noted, no edema  PSYCH: mentation appears normal, affect normal/bright      ASSESSMENT/PLAN:       1. Diabetic polyneuropathy associated with type 2 diabetes mellitus (H)  Continue gabapentin    2. Type 2 diabetes mellitus without complication, without long-term current use of insulin (H)  Chronic  - stop trulicity  - blood glucose monitoring (ONE TOUCH ULTRA MINI) meter device kit; Use to test blood sugars 2 times daily or as directed.  Dispense: 1 kit; Refill: 0  - blood glucose monitoring (NO BRAND SPECIFIED) test strip; Use to test blood sugars 2 times daily or as directed  Dispense: 100 each; Refill: 11  - blood glucose (NO BRAND SPECIFIED) lancets standard; Use to test blood sugar once times daily or as directed.  Dispense: 100 each; Refill: 11  - start insulin glargine (LANTUS SOLOSTAR) 100 UNIT/ML injection; Inject 10 Units Subcutaneous At Bedtime  Dispense: 15 mL; Refill: 1  - continue to check glucose levels, if fasting readings are over 120, increase lantus by 2 units every three days until those fasting readings are around 120.    - continue metformin for now - if no improvement in GI symptoms may consider stopping  - insulin pen needle 31G X 6 MM; Use 1 daily or  as directed.  Dispense: 100 each; Refill: prn  - Hemoglobin A1c    3. Benign essential hypertension  uncontrolled  - increase losartan (COZAAR) 50 MG tablet; Take 1 tablet (50 mg) by mouth daily  Dispense: 90 tablet; Refill: 1  - continue metoprolol 25mg daily  - TSH with free T4 reflex  - Basic metabolic panel  - blood pressure monitor - check Blood pressure at home 3 times a week.      4. Mixed incontinence urge and stress (male)(female)  UA today, if no improvement will change to vesicare.     5. Anxiety  Continue current plan    6. Mild recurrent major depression (H)  As above    7. Hyperlipidemia LDL goal <100  - Lipid Profile    8. On statin therapy  - Hepatic panel    9. Tension headache  chronic  - ibuprofen (ADVIL/MOTRIN) 800 MG tablet; Take 1 tablet (800 mg) by mouth every 8 hours as needed for moderate pain  Dispense: 90 tablet; Refill: 1      FUTURE APPOINTMENTS:       - Follow-up visit in 1 month or as needed for acute concern.     Daja Obrien NP  [facility]

## 2017-08-14 NOTE — PATIENT INSTRUCTIONS
ASSESSMENT/PLAN:       1. Diabetic polyneuropathy associated with type 2 diabetes mellitus (H)    2. Type 2 diabetes mellitus without complication, without long-term current use of insulin (H)  Chronic  - stop trulicity  - blood glucose monitoring (ONE TOUCH ULTRA MINI) meter device kit; Use to test blood sugars 2 times daily or as directed.  Dispense: 1 kit; Refill: 0  - blood glucose monitoring (NO BRAND SPECIFIED) test strip; Use to test blood sugars 2 times daily or as directed  Dispense: 100 each; Refill: 11  - blood glucose (NO BRAND SPECIFIED) lancets standard; Use to test blood sugar once times daily or as directed.  Dispense: 100 each; Refill: 11  - start insulin glargine (LANTUS SOLOSTAR) 100 UNIT/ML injection; Inject 10 Units Subcutaneous At Bedtime  Dispense: 15 mL; Refill: 1  - continue to check glucose levels, if fasting readings are over 120, increase lantus by 2 units every three days until those fasting readings are around 120.    - insulin pen needle 31G X 6 MM; Use 1 daily or as directed.  Dispense: 100 each; Refill: prn  - Hemoglobin A1c    3. Benign essential hypertension  uncontrolled  - increase losartan (COZAAR) 50 MG tablet; Take 1 tablet (50 mg) by mouth daily  Dispense: 90 tablet; Refill: 1  - continue metoprolol 25mg daily  - TSH with free T4 reflex  - Basic metabolic panel  - blood pressure monitor - check Blood pressure at home 3 times a week.      4. Mixed incontinence urge and stress (male)(female)  UA today, if no improvement will change to vesicare.     5. Anxiety  Continue current plan    6. Mild recurrent major depression (H)  As above    7. Hyperlipidemia LDL goal <100  - Lipid Profile    8. On statin therapy  - Hepatic panel    9. Tension headache  chronic  - ibuprofen (ADVIL/MOTRIN) 800 MG tablet; Take 1 tablet (800 mg) by mouth every 8 hours as needed for moderate pain  Dispense: 90 tablet; Refill: 1      FUTURE APPOINTMENTS:       - Follow-up visit in 1 month or as needed  for acute concern.     Daja Obrien, NP  [facility]

## 2017-08-15 ASSESSMENT — ANXIETY QUESTIONNAIRES: GAD7 TOTAL SCORE: 13

## 2017-08-18 ENCOUNTER — TRANSFERRED RECORDS (OUTPATIENT)
Dept: HEALTH INFORMATION MANAGEMENT | Facility: HOSPITAL | Age: 47
End: 2017-08-18

## 2017-09-13 ENCOUNTER — TELEPHONE (OUTPATIENT)
Dept: FAMILY MEDICINE | Facility: OTHER | Age: 47
End: 2017-09-13

## 2017-09-13 DIAGNOSIS — B37.31 CANDIDIASIS OF VAGINA: Primary | ICD-10-CM

## 2017-09-13 RX ORDER — FLUCONAZOLE 150 MG/1
150 TABLET ORAL DAILY
Qty: 3 TABLET | Refills: 0 | Status: SHIPPED | OUTPATIENT
Start: 2017-09-13 | End: 2017-09-16

## 2017-09-13 NOTE — TELEPHONE ENCOUNTER
Diflucan is sent to [facility].  Follow-up if no improvement or any worsening of symptoms.

## 2017-09-13 NOTE — TELEPHONE ENCOUNTER
Pt called and has had a yeast infection for about 2 weeks was wondering if you could do a script for her   Pamela M Lechevalier LPN

## 2017-10-05 ENCOUNTER — TELEPHONE (OUTPATIENT)
Dept: FAMILY MEDICINE | Facility: OTHER | Age: 47
End: 2017-10-05

## 2017-10-05 NOTE — TELEPHONE ENCOUNTER
10:41 AM    Reason for Call: Phone Call    Description: Andrade from [facility] called and stated they received a prescription for fluocinonide cream but part of it was cut off and they need you to call them back at [phone] ext [phone] and use reference # [id number]    Was an appointment offered for this call? No  If yes : Appointment type              Date    Preferred method for responding to this message: Telephone Call  What is your phone number ?    If we cannot reach you directly, may we leave a detailed response at the number you provided? Yes    Can this message wait until your PCP/provider returns, if available today? Not applicable    Heidi Vasquez

## 2017-10-20 ENCOUNTER — TELEPHONE (OUTPATIENT)
Dept: FAMILY MEDICINE | Facility: OTHER | Age: 47
End: 2017-10-20

## 2017-10-20 DIAGNOSIS — E11.9 TYPE 2 DIABETES MELLITUS WITHOUT COMPLICATION, WITHOUT LONG-TERM CURRENT USE OF INSULIN (H): ICD-10-CM

## 2017-10-20 DIAGNOSIS — B37.31 CANDIDIASIS OF VAGINA: Primary | ICD-10-CM

## 2017-10-20 RX ORDER — FLUCONAZOLE 150 MG/1
150 TABLET ORAL DAILY
Qty: 3 TABLET | Refills: 0 | Status: SHIPPED | OUTPATIENT
Start: 2017-10-20 | End: 2017-10-23

## 2017-10-20 ASSESSMENT — ANXIETY QUESTIONNAIRES
4. TROUBLE RELAXING: NEARLY EVERY DAY
GAD7 TOTAL SCORE: 13
3. WORRYING TOO MUCH ABOUT DIFFERENT THINGS: SEVERAL DAYS
7. FEELING AFRAID AS IF SOMETHING AWFUL MIGHT HAPPEN: SEVERAL DAYS
IF YOU CHECKED OFF ANY PROBLEMS ON THIS QUESTIONNAIRE, HOW DIFFICULT HAVE THESE PROBLEMS MADE IT FOR YOU TO DO YOUR WORK, TAKE CARE OF THINGS AT HOME, OR GET ALONG WITH OTHER PEOPLE: SOMEWHAT DIFFICULT
6. BECOMING EASILY ANNOYED OR IRRITABLE: NEARLY EVERY DAY
5. BEING SO RESTLESS THAT IT IS HARD TO SIT STILL: SEVERAL DAYS
2. NOT BEING ABLE TO STOP OR CONTROL WORRYING: SEVERAL DAYS
1. FEELING NERVOUS, ANXIOUS, OR ON EDGE: NEARLY EVERY DAY

## 2017-10-20 ASSESSMENT — PATIENT HEALTH QUESTIONNAIRE - PHQ9: SUM OF ALL RESPONSES TO PHQ QUESTIONS 1-9: 16

## 2017-10-20 NOTE — TELEPHONE ENCOUNTER
If she is taking 10 units of lantus, increase to 15 units.  Continue monitoring.  Vaginal yeast infection - will send fluconazole.

## 2017-10-20 NOTE — TELEPHONE ENCOUNTER
Pt is taking 30 units noted to up to 35 units will check on her next week.  Pamela M Lechevalier LPN

## 2017-10-20 NOTE — TELEPHONE ENCOUNTER
Talked with pt did phq9 over the phone still at a 16 also noted blood sugars have been around 300 in am also has a yeast infection again states seems to be getting them at end of month thinking d/t elevated blood sugars did send her to scheduling for a follow up any things we want to do now ?   Pamela M Lechevalier LPN

## 2017-10-21 ASSESSMENT — ANXIETY QUESTIONNAIRES: GAD7 TOTAL SCORE: 13

## 2017-10-29 DIAGNOSIS — E11.9 TYPE 2 DIABETES MELLITUS WITHOUT COMPLICATION (H): ICD-10-CM

## 2017-10-30 NOTE — TELEPHONE ENCOUNTER
Simvastatin      Last Written Prescription Date: 12/12/16  Last Fill Quantity: 90,  # refills: 2   Last Office Visit with G, P or [facility] prescribing provider: 8/14/17

## 2017-11-01 RX ORDER — SIMVASTATIN 10 MG
TABLET ORAL
Qty: 90 TABLET | Refills: 1 | Status: SHIPPED | OUTPATIENT
Start: 2017-11-01 | End: 2018-05-01

## 2017-11-20 ENCOUNTER — OFFICE VISIT (OUTPATIENT)
Dept: FAMILY MEDICINE | Facility: OTHER | Age: 47
End: 2017-11-20
Attending: NURSE PRACTITIONER
Payer: COMMERCIAL

## 2017-11-20 VITALS
OXYGEN SATURATION: 97 % | HEART RATE: 94 BPM | WEIGHT: 170 LBS | BODY MASS INDEX: 33.38 KG/M2 | DIASTOLIC BLOOD PRESSURE: 96 MMHG | SYSTOLIC BLOOD PRESSURE: 150 MMHG | HEIGHT: 60 IN | RESPIRATION RATE: 14 BRPM | TEMPERATURE: 97.3 F

## 2017-11-20 DIAGNOSIS — B37.31 RECURRENT CANDIDIASIS OF VAGINA: ICD-10-CM

## 2017-11-20 DIAGNOSIS — E11.9 TYPE 2 DIABETES MELLITUS WITHOUT COMPLICATION, WITH LONG-TERM CURRENT USE OF INSULIN (H): Primary | ICD-10-CM

## 2017-11-20 DIAGNOSIS — Z79.4 TYPE 2 DIABETES MELLITUS WITHOUT COMPLICATION, WITH LONG-TERM CURRENT USE OF INSULIN (H): Primary | ICD-10-CM

## 2017-11-20 DIAGNOSIS — I10 BENIGN ESSENTIAL HYPERTENSION: ICD-10-CM

## 2017-11-20 DIAGNOSIS — Z79.899 ON STATIN THERAPY: ICD-10-CM

## 2017-11-20 DIAGNOSIS — Z23 NEED FOR PROPHYLACTIC VACCINATION AND INOCULATION AGAINST INFLUENZA: ICD-10-CM

## 2017-11-20 DIAGNOSIS — F41.9 ANXIETY: ICD-10-CM

## 2017-11-20 DIAGNOSIS — E78.5 HYPERLIPIDEMIA LDL GOAL <100: ICD-10-CM

## 2017-11-20 DIAGNOSIS — F33.0 MILD RECURRENT MAJOR DEPRESSION (H): ICD-10-CM

## 2017-11-20 DIAGNOSIS — N39.46 MIXED INCONTINENCE URGE AND STRESS (MALE)(FEMALE): ICD-10-CM

## 2017-11-20 LAB
ALBUMIN SERPL-MCNC: 3.7 G/DL (ref 3.4–5)
ALBUMIN UR-MCNC: NEGATIVE MG/DL
ALP SERPL-CCNC: 103 U/L (ref 40–150)
ALT SERPL W P-5'-P-CCNC: 36 U/L (ref 0–50)
ANION GAP SERPL CALCULATED.3IONS-SCNC: 9 MMOL/L (ref 3–14)
APPEARANCE UR: CLEAR
AST SERPL W P-5'-P-CCNC: 24 U/L (ref 0–45)
BILIRUB DIRECT SERPL-MCNC: 0.2 MG/DL (ref 0–0.2)
BILIRUB SERPL-MCNC: 0.5 MG/DL (ref 0.2–1.3)
BILIRUB UR QL STRIP: NEGATIVE
BUN SERPL-MCNC: 12 MG/DL (ref 7–30)
CALCIUM SERPL-MCNC: 9.2 MG/DL (ref 8.5–10.1)
CHLORIDE SERPL-SCNC: 103 MMOL/L (ref 94–109)
CHOLEST SERPL-MCNC: 168 MG/DL
CO2 SERPL-SCNC: 26 MMOL/L (ref 20–32)
COLOR UR AUTO: YELLOW
CREAT SERPL-MCNC: 0.53 MG/DL (ref 0.52–1.04)
EST. AVERAGE GLUCOSE BLD GHB EST-MCNC: 232 MG/DL
GFR SERPL CREATININE-BSD FRML MDRD: >90 ML/MIN/1.7M2
GLUCOSE SERPL-MCNC: 264 MG/DL (ref 70–99)
GLUCOSE UR STRIP-MCNC: >=1000 MG/DL
HBA1C MFR BLD: 9.7 % (ref 4.3–6)
HDLC SERPL-MCNC: 49 MG/DL
HGB UR QL STRIP: NEGATIVE
KETONES UR STRIP-MCNC: NEGATIVE MG/DL
LDLC SERPL CALC-MCNC: 93 MG/DL
LEUKOCYTE ESTERASE UR QL STRIP: NEGATIVE
NITRATE UR QL: NEGATIVE
NONHDLC SERPL-MCNC: 119 MG/DL
PH UR STRIP: 5.5 PH (ref 5–7)
POTASSIUM SERPL-SCNC: 3.9 MMOL/L (ref 3.4–5.3)
PROT SERPL-MCNC: 8.2 G/DL (ref 6.8–8.8)
SODIUM SERPL-SCNC: 138 MMOL/L (ref 133–144)
SOURCE: ABNORMAL
SP GR UR STRIP: 1.02 (ref 1–1.03)
TRIGL SERPL-MCNC: 132 MG/DL
TSH SERPL DL<=0.005 MIU/L-ACNC: 2.98 MU/L (ref 0.4–4)
UROBILINOGEN UR STRIP-ACNC: 0.2 EU/DL (ref 0.2–1)

## 2017-11-20 PROCEDURE — 36415 COLL VENOUS BLD VENIPUNCTURE: CPT | Mod: ZL | Performed by: NURSE PRACTITIONER

## 2017-11-20 PROCEDURE — 80048 BASIC METABOLIC PNL TOTAL CA: CPT | Mod: ZL | Performed by: NURSE PRACTITIONER

## 2017-11-20 PROCEDURE — 40000788 ZZHCL STATISTIC ESTIMATED AVERAGE GLUCOSE: Mod: ZL | Performed by: NURSE PRACTITIONER

## 2017-11-20 PROCEDURE — 99214 OFFICE O/P EST MOD 30 MIN: CPT | Performed by: NURSE PRACTITIONER

## 2017-11-20 PROCEDURE — 84443 ASSAY THYROID STIM HORMONE: CPT | Mod: ZL | Performed by: NURSE PRACTITIONER

## 2017-11-20 PROCEDURE — 90686 IIV4 VACC NO PRSV 0.5 ML IM: CPT | Performed by: NURSE PRACTITIONER

## 2017-11-20 PROCEDURE — 80061 LIPID PANEL: CPT | Mod: ZL | Performed by: NURSE PRACTITIONER

## 2017-11-20 PROCEDURE — 82043 UR ALBUMIN QUANTITATIVE: CPT | Mod: ZL | Performed by: NURSE PRACTITIONER

## 2017-11-20 PROCEDURE — 80076 HEPATIC FUNCTION PANEL: CPT | Mod: ZL | Performed by: NURSE PRACTITIONER

## 2017-11-20 PROCEDURE — 83036 HEMOGLOBIN GLYCOSYLATED A1C: CPT | Mod: ZL | Performed by: NURSE PRACTITIONER

## 2017-11-20 PROCEDURE — 81003 URINALYSIS AUTO W/O SCOPE: CPT | Mod: ZL | Performed by: NURSE PRACTITIONER

## 2017-11-20 PROCEDURE — 90471 IMMUNIZATION ADMIN: CPT | Performed by: NURSE PRACTITIONER

## 2017-11-20 PROCEDURE — 99212 OFFICE O/P EST SF 10 MIN: CPT | Mod: 25

## 2017-11-20 RX ORDER — FLUCONAZOLE 150 MG/1
TABLET ORAL
Qty: 4 TABLET | Refills: 5 | Status: SHIPPED | OUTPATIENT
Start: 2017-11-20

## 2017-11-20 RX ORDER — LOSARTAN POTASSIUM 100 MG/1
100 TABLET ORAL DAILY
Qty: 30 TABLET | Refills: 5 | Status: SHIPPED | OUTPATIENT
Start: 2017-11-20 | End: 2018-05-31

## 2017-11-20 ASSESSMENT — ANXIETY QUESTIONNAIRES
GAD7 TOTAL SCORE: 8
IF YOU CHECKED OFF ANY PROBLEMS ON THIS QUESTIONNAIRE, HOW DIFFICULT HAVE THESE PROBLEMS MADE IT FOR YOU TO DO YOUR WORK, TAKE CARE OF THINGS AT HOME, OR GET ALONG WITH OTHER PEOPLE: SOMEWHAT DIFFICULT
7. FEELING AFRAID AS IF SOMETHING AWFUL MIGHT HAPPEN: SEVERAL DAYS
5. BEING SO RESTLESS THAT IT IS HARD TO SIT STILL: SEVERAL DAYS
2. NOT BEING ABLE TO STOP OR CONTROL WORRYING: SEVERAL DAYS
1. FEELING NERVOUS, ANXIOUS, OR ON EDGE: SEVERAL DAYS
6. BECOMING EASILY ANNOYED OR IRRITABLE: SEVERAL DAYS
3. WORRYING TOO MUCH ABOUT DIFFERENT THINGS: SEVERAL DAYS

## 2017-11-20 ASSESSMENT — PATIENT HEALTH QUESTIONNAIRE - PHQ9
SUM OF ALL RESPONSES TO PHQ QUESTIONS 1-9: 13
5. POOR APPETITE OR OVEREATING: MORE THAN HALF THE DAYS

## 2017-11-20 NOTE — PROGRESS NOTES
SUBJECTIVE:   Lili Nava is a 47 year old female who presents to clinic today for the following health issues:      Diabetes Follow-up    Patient is checking blood sugars: twice daily.    Blood sugar testing frequency justification: hyperglycemia  Results are as follows:       am - 256+       suppertime - 250       Pm - 300-400        Diabetic concerns: None and blood sugar frequently over 200 - fatigued, thirsty, no energy and recurrent vaginal yeast infections.       Symptoms of hypoglycemia (low blood sugar): none     Paresthesias (numbness or burning in feet) or sores: YES, feet tingle alot     Date of last diabetic eye exam: within last 6 months    Hyperlipidemia Follow-Up      Rate your low fat/cholesterol diet?: good    Taking statin?  Yes, no muscle aches from statin    Other lipid medications/supplements?:  Starting fish oil    Hypertension Follow-up      Outpatient blood pressures are being checked at home.  Results are high.  Low Salt Diet: low salt    Depression and Anxiety Follow-Up    Status since last visit: Some days worse than others    Other associated symptoms: insomnia    Complicating factors:     Significant life event: No     Current substance abuse: None    PHQ-9 Score and MyChart F/U Questions 8/21/2017 10/20/2017 11/20/2017   Total Score 16 16 13   Q9: Suicide Ideation Not at all Not at all Nearly every day     [place]-7 SCORE 8/21/2017 10/20/2017 11/20/2017   Total Score 11 (moderate anxiety) - -   Total Score 11 13 8       PHQ-9  English  PHQ-9   Any Language  GAD7  Suicide Assessment Five-step Evaluation and Treatment (SAFE-T)        Amount of exercise or physical activity: Walk every day    Problems taking medications regularly: No    Medication side effects: none  Diet: low salt and low fat/cholesterol      Problem list and histories reviewed & adjusted, as indicated.  Additional history: as documented    Patient Active Problem List   Diagnosis     Type 2 diabetes mellitus without  complication, without long-term current use of insulin (H)     Benign essential hypertension     Anxiety     Mild recurrent major depression (H)     Post traumatic stress disorder     Diabetic neuropathy associated with type 2 diabetes mellitus (H)     Primary insomnia     Eczema     Vitamin D deficiency     Mixed incontinence urge and stress (male)(female)     Hyperlipidemia LDL goal <100     Past Surgical History:   Procedure Laterality Date     COLONOSCOPY  01/01/2013     HYSTERECTOMY         Social History   Substance Use Topics     Smoking status: Former Smoker     Smokeless tobacco: Never Used     Alcohol use Yes      Comment: rare     Family History   Problem Relation Age of Onset     Hypertension Mother      Coronary Artery Disease Father          Current Outpatient Prescriptions   Medication Sig Dispense Refill     simvastatin (ZOCOR) 10 MG tablet TAKE 1 TABLET BY MOUTH NIGHTLY AT BEDTIME 90 tablet 1     insulin glargine (LANTUS SOLOSTAR) 100 UNIT/ML injection Inject 35 Units Subcutaneous At Bedtime 15 mL 1     blood glucose monitoring (ONE TOUCH ULTRA MINI) meter device kit Use to test blood sugars 2 times daily or as directed. 1 kit 0     blood glucose monitoring (NO BRAND SPECIFIED) test strip Use to test blood sugars 2 times daily or as directed 100 each 11     blood glucose (NO BRAND SPECIFIED) lancets standard Use to test blood sugar once times daily or as directed. 100 each 11     losartan (COZAAR) 50 MG tablet Take 1 tablet (50 mg) by mouth daily 90 tablet 1     insulin pen needle 31G X 6 MM Use 1 daily or as directed. 100 each prn     ibuprofen (ADVIL/MOTRIN) 800 MG tablet Take 1 tablet (800 mg) by mouth every 8 hours as needed for moderate pain 90 tablet 1     order for DME Blood pressure monitor 1 each 0     potassium chloride SA (K-DUR/KLOR-CON M) 20 MEQ CR tablet TAKE 1 TABLET BY MOUTH EVER Y DAY 30 tablet 5     VITAMIN D, CHOLECALCIFEROL, PO Take 2,000 Units by mouth daily       escitalopram  (LEXAPRO) 20 MG tablet Take 1 tablet (20 mg) by mouth daily 30 tablet 5     oxybutynin (DITROPAN-XL) 10 MG 24 hr tablet Take 1 tablet (10 mg) by mouth daily 30 tablet 5     vitamin D (ERGOCALCIFEROL) 81991 UNIT capsule Take one capsule by mouth every Monday, Wednesday and Friday 36 capsule 0     metFORMIN (GLUCOPHAGE-XR) 500 MG 24 hr tablet Take 1 tablet (500 mg) by mouth 2 times daily (with meals) 180 tablet 1     hydrOXYzine (ATARAX) 25 MG tablet Take 1-2 tablets (25-50 mg) by mouth every 6 hours as needed for anxiety (or insomnia) 60 tablet 1     metoprolol (TOPROL-XL) 25 MG 24 hr tablet TAKE 1 TABLET BY MOUTH ROLA Y 30 tablet 5     gabapentin (NEURONTIN) 300 MG capsule Take 1 tablet (300 mg) every night for 1-3 days, then 1 tablet twice daily for 1-3 days, then 1 tablet three times daily (Patient not taking: Reported on 11/20/2017) 90 capsule 0     Allergies   Allergen Reactions     Celexa [Citalopram] Other (See Comments)     jittery     Lisinopril Cough     Recent Labs   Lab Test  08/14/17   1054 06/20/17 04/20/17   1552  10/20/16   1046   A1C  8.5*   --   7.6*  7.4*   LDL  97   --   84  108*   HDL  54   --   51  51   TRIG  78   --   167*  84   ALT  77*   --   41  41   CR  0.54  0.75  0.65  0.66   GFRESTIMATED  >90  Non  GFR Calc     --   >90  Non  GFR Calc    >90  Non  GFR Calc     GFRESTBLACK  >90   GFR Calc     --   >90   GFR Calc    >90   GFR Calc     POTASSIUM  4.0  3.8  4.1  4.2   TSH  0.37*   --   1.14  1.61      BP Readings from Last 3 Encounters:   11/20/17 (!) 150/96   08/14/17 (!) 144/98   05/04/17 138/80    Wt Readings from Last 3 Encounters:   11/20/17 170 lb (77.1 kg)   08/14/17 173 lb 3.2 oz (78.6 kg)   05/04/17 182 lb 3.2 oz (82.6 kg)            Reviewed and updated as needed this visit by clinical staffTobacco  Allergies       Reviewed and updated as needed this visit by Provider          ROS:  Constitutional, HEENT, cardiovascular, pulmonary, gi and gu systems are negative, except as otherwise noted.      OBJECTIVE:   BP (!) 150/96 (BP Location: Right arm, Patient Position: Sitting, Cuff Size: Adult Large)  Pulse 94  Temp 97.3  F (36.3  C) (Tympanic)  Resp 14  Ht 5' (1.524 m)  Wt 170 lb (77.1 kg)  SpO2 97%  BMI 33.2 kg/m2  Body mass index is 33.2 kg/(m^2).  GENERAL: alert, no distress and obese  NECK: no adenopathy, no asymmetry, masses, or scars, thyroid normal to palpation and no carotid bruits  RESP: lungs clear to auscultation - no rales, rhonchi or wheezes  CV: regular rate and rhythm, normal S1 S2, no S3 or S4, no murmur, click or rub, no peripheral edema and peripheral pulses strong  MS: no gross musculoskeletal defects noted, no edema  PSYCH: mentation appears normal, affect normal/bright  Diabetic foot exam: normal DP and PT pulses, no trophic changes or ulcerative lesions and normal sensory exam        ASSESSMENT/PLAN:       1. Type 2 diabetes mellitus without complication, with long-term current use of insulin (H)  uncontrolled  - start exenatide ER (BYDUREON) 2 MG recon vial kit susp for weekly inj; Inject 2 mg Subcutaneous once a week  Dispense: 4 kit; Refill: 11  - increase insulin glargine (LANTUS SOLOSTAR) 100 UNIT/ML injection; Inject 40 Units Subcutaneous At Bedtime  Dispense: 15 mL; Refill: 11  - Hemoglobin A1c  - *UA reflex to Microscopic and Culture - [facility]  - Albumin Random Urine Quantitative with Creat Ratio  - C FOOT EXAM  NO CHARGE  - continue checking glucose levels, increase lantus to 45 units in one week if fasting readings are over 130.     2. Hyperlipidemia LDL goal <100  chronic  - Lipid Profile    3. Benign essential hypertension  chronic  - TSH with free T4 reflex  - Basic metabolic panel  - continue metoprolol  - increase losartan to 100mg daily    4. Mixed incontinence urge and stress (male)(female)  Continue current plan    5. Mild recurrent  major depression (H)  chronic  - DEPRESSION ACTION PLAN (DAP)    6. Anxiety  Continue current plan    7 On statin therapy  - Hepatic panel    8. Recurrent candidiasis of vagina  - fluconazole (DIFLUCAN) 150 MG tablet; Take one tablet once a week  Dispense: 4 tablet; Refill: 5      Flu vaccine updated today    FUTURE APPOINTMENTS:       - Follow-up visit in 1 month    Daja Obrien NP  [facility]

## 2017-11-20 NOTE — PROGRESS NOTES

## 2017-11-20 NOTE — MR AVS SNAPSHOT
After Visit Summary   11/20/2017    Lili Nava    MRN: [id number]           Patient Information     Date Of Birth          1970        Visit Information        Provider Department      11/20/2017 10:15 AM Daja Obrien NP [facility]        Today's Diagnoses     Type 2 diabetes mellitus without complication, with long-term current use of insulin (H)    -  1    Hyperlipidemia LDL goal <100        Benign essential hypertension        Mixed incontinence urge and stress (male)(female)        Mild recurrent major depression (H)        Anxiety        On statin therapy        Recurrent candidiasis of vagina        Need for prophylactic vaccination and inoculation against influenza          Care Instructions      ASSESSMENT/PLAN:       1. Type 2 diabetes mellitus without complication, with long-term current use of insulin (H)  uncontrolled  - start exenatide ER (BYDUREON) 2 MG recon vial kit susp for weekly inj; Inject 2 mg Subcutaneous once a week  Dispense: 4 kit; Refill: 11  - increase insulin glargine (LANTUS SOLOSTAR) 100 UNIT/ML injection; Inject 40 Units Subcutaneous At Bedtime  Dispense: 15 mL; Refill: 11  - Hemoglobin A1c  - *UA reflex to Microscopic and Culture - [facility]  - Albumin Random Urine Quantitative with Creat Ratio  - C FOOT EXAM  NO CHARGE  - continue checking glucose levels, increase lantus to 45 units in one week if fasting readings are over 130.     2. Hyperlipidemia LDL goal <100  chronic  - Lipid Profile    3. Benign essential hypertension  chronic  - TSH with free T4 reflex  - Basic metabolic panel  - continue metoprolol  - increase losartan to 100mg daily    4. Mixed incontinence urge and stress (male)(female)  Continue current plan    5. Mild recurrent major depression (H)  chronic  - DEPRESSION ACTION PLAN (DAP)    6. Anxiety  Continue current plan    7 On statin therapy  - Hepatic panel    8. Recurrent candidiasis of vagina  -  "fluconazole (DIFLUCAN) 150 MG tablet; Take one tablet once a week  Dispense: 4 tablet; Refill: 5      Flu vaccine updated today    FUTURE APPOINTMENTS:       - Follow-up visit in 1 month    Daja Obrien NP  [facility]            Follow-ups after your visit        Follow-up notes from your care team     Return in about 3 months (around 2/20/2018), or if symptoms worsen or fail to improve.      Your next 10 appointments already scheduled     Dec 20, 2017 10:00 AM CST   (Arrive by 9:45 AM)   SHORT with Daja Obrien NP   [facility]    [address] [facility] [address]   [phone]              Who to contact     If you have questions or need follow up information about today's clinic visit or your schedule please contact [facility] directly at [phone].  Normal or non-critical lab and imaging results will be communicated to you by MyChart, letter or phone within 4 business days after the clinic has received the results. If you do not hear from us within 7 days, please contact the clinic through [employer] or phone. If you have a critical or abnormal lab result, we will notify you by phone as soon as possible.  Submit refill requests through [email or web address] or call your pharmacy and they will forward the refill request to us. Please allow 3 business days for your refill to be completed.          Additional Information About Your Visit        [employer] Information     [email or web address] lets you send messages to your doctor, view your test results, renew your prescriptions, schedule appointments and more. To sign up, go to [email or web address] . Click on \"Log in\" on the left side of the screen, which will take you to the Welcome page. Then click on \"Sign up Now\" on the right side of the page.     You will be asked to enter the access code listed below, as well as some personal information. Please follow the directions " to create your username and password.     Your access code is: [email or web address]  Expires: 2018 11:23 AM     Your access code will  in 90 days. If you need help or a new code, please call your [place] clinic or [phone].        Care EveryWhere ID     This is your Care EveryWhere ID. This could be used by other organizations to access your [place] medical records  [id number]        Your Vitals Were     Pulse Temperature Respirations Height Pulse Oximetry BMI (Body Mass Index)    94 97.3  F (36.3  C) (Tympanic) 14 5' (1.524 m) 97% 33.2 kg/m2       Blood Pressure from Last 3 Encounters:   17 (!) 150/96   17 (!) 144/98   17 138/80    Weight from Last 3 Encounters:   17 170 lb (77.1 kg)   17 173 lb 3.2 oz (78.6 kg)   17 182 lb 3.2 oz (82.6 kg)              We Performed the Following     *UA reflex to Microscopic and Culture - [facility]     Albumin Random Urine Quantitative with Creat Ratio     Basic metabolic panel     C FOOT EXAM  NO CHARGE     DEPRESSION ACTION PLAN (DAP)     FLU VAC, SPLIT VIRUS IM > 3 YO (QUADRIVALENT) [39507]     Hemoglobin A1c     Hepatic panel     Lipid Profile     TSH with free T4 reflex     Vaccine Administration, Initial [29135]          Today's Medication Changes          These changes are accurate as of: 17 11:23 AM.  If you have any questions, ask your nurse or doctor.               Start taking these medicines.        Dose/Directions    exenatide ER 2 MG recon vial kit susp for weekly inj   Commonly known as:  BYDUREON   Used for:  Type 2 diabetes mellitus without complication, with long-term current use of insulin (H)   Started by:  Daja Obrien NP        Dose:  2 mg   Inject 2 mg Subcutaneous once a week   Quantity:  4 kit   Refills:  11       fluconazole 150 MG tablet   Commonly known as:  DIFLUCAN   Used for:  Recurrent candidiasis of vagina   Started by:  Daja Obrien NP        Take one tablet once  a week   Quantity:  4 tablet   Refills:  5         These medicines have changed or have updated prescriptions.        Dose/Directions    insulin glargine 100 UNIT/ML injection   Commonly known as:  LANTUS SOLOSTAR   This may have changed:  how much to take   Used for:  Type 2 diabetes mellitus without complication, with long-term current use of insulin (H)   Changed by:  Daja Obrien NP        Dose:  40 Units   Inject 40 Units Subcutaneous At Bedtime   Quantity:  15 mL   Refills:  11       losartan 100 MG tablet   Commonly known as:  COZAAR   This may have changed:    - medication strength  - how much to take   Used for:  Benign essential hypertension   Changed by:  Daja Obrien NP        Dose:  100 mg   Take 1 tablet (100 mg) by mouth daily   Quantity:  30 tablet   Refills:  5            Where to get your medicines      These medications were sent to [facility] - [address] [address]     Phone:  [phone]     exenatide ER 2 MG recon vial kit susp for weekly inj    fluconazole 150 MG tablet    insulin glargine 100 UNIT/ML injection    losartan 100 MG tablet                Primary Care Provider Office Phone # Fax #    Daja Obrien [phone] [address] [facility] [address]        Equal Access to Services     DEMETRIUS FLORES AH: Kong domínguezo Soomaali, waaxda luqadaha, qaybta kaalmada adeegyada, soraida rob. So [facility] [phone].    ATENCIÓN: Si habla español, tiene a mullins disposición servicios gratuitos de asistencia lingüística. ame al [phone].    We comply with applicable federal civil rights laws and [place] laws. We do not discriminate on the basis of race, color, national origin, age, disability, sex, sexual orientation, or gender identity.            Thank you!     Thank you for choosing [facility]  for your care. Our  goal is always to provide you with excellent care. Hearing back from our patients is one way we can continue to improve our services. Please take a few minutes to complete the written survey that you may receive in the mail after your visit with us. Thank you!             Your Updated Medication List - Protect others around you: Learn how to safely use, store and throw away your medicines at www.disposemymeds.org.          This list is accurate as of: 11/20/17 11:23 AM.  Always use your most recent med list.                   Brand Name Dispense Instructions for use Diagnosis    blood glucose lancets standard    no brand specified    100 each    Use to test blood sugar once times daily or as directed.    Type 2 diabetes mellitus without complication, without long-term current use of insulin (H)       blood glucose monitoring meter device kit     1 kit    Use to test blood sugars 2 times daily or as directed.    Type 2 diabetes mellitus without complication, without long-term current use of insulin (H)       blood glucose monitoring test strip    no brand specified    100 each    Use to test blood sugars 2 times daily or as directed    Type 2 diabetes mellitus without complication, without long-term current use of insulin (H)       escitalopram 20 MG tablet    LEXAPRO    30 tablet    Take 1 tablet (20 mg) by mouth daily    Anxiety       exenatide ER 2 MG recon vial kit susp for weekly inj    BYDUREON    4 kit    Inject 2 mg Subcutaneous once a week    Type 2 diabetes mellitus without complication, with long-term current use of insulin (H)       fluconazole 150 MG tablet    DIFLUCAN    4 tablet    Take one tablet once a week    Recurrent candidiasis of vagina       gabapentin 300 MG capsule    NEURONTIN    90 capsule    Take 1 tablet (300 mg) every night for 1-3 days, then 1 tablet twice daily for 1-3 days, then 1 tablet three times daily    Anxiety, Diabetic polyneuropathy associated with type 2 diabetes mellitus  (H), Chronic bilateral low back pain with sciatica, sciatica laterality unspecified       hydrOXYzine 25 MG tablet    ATARAX    60 tablet    Take 1-2 tablets (25-50 mg) by mouth every 6 hours as needed for anxiety (or insomnia)    Anxiety       ibuprofen 800 MG tablet    ADVIL/MOTRIN    90 tablet    Take 1 tablet (800 mg) by mouth every 8 hours as needed for moderate pain    Tension headache       insulin glargine 100 UNIT/ML injection    LANTUS SOLOSTAR    15 mL    Inject 40 Units Subcutaneous At Bedtime    Type 2 diabetes mellitus without complication, with long-term current use of insulin (H)       insulin pen needle 31G X 6 MM     100 each    Use 1 daily or as directed.    Type 2 diabetes mellitus without complication, without long-term current use of insulin (H)       losartan 100 MG tablet    COZAAR    30 tablet    Take 1 tablet (100 mg) by mouth daily    Benign essential hypertension       metFORMIN 500 MG 24 hr tablet    GLUCOPHAGE-XR    180 tablet    Take 1 tablet (500 mg) by mouth 2 times daily (with meals)    Type 2 diabetes mellitus without complication, without long-term current use of insulin (H)       metoprolol 25 MG 24 hr tablet    TOPROL-XL    30 tablet    TAKE 1 TABLET BY MOUTH ROLA Y    Benign essential hypertension       order for DME     1 each    Blood pressure monitor    Benign essential hypertension       oxybutynin 10 MG 24 hr tablet    DITROPAN-XL    30 tablet    Take 1 tablet (10 mg) by mouth daily    Mixed incontinence urge and stress (male)(female)       potassium chloride SA 20 MEQ CR tablet    K-DUR/KLOR-CON M    30 tablet    TAKE 1 TABLET BY MOUTH EVER Y DAY    Hypokalemia       simvastatin 10 MG tablet    ZOCOR    90 tablet    TAKE 1 TABLET BY MOUTH NIGHTLY AT BEDTIME    Type 2 diabetes mellitus without complication (H)       VITAMIN D (CHOLECALCIFEROL) PO      Take 2,000 Units by mouth daily        vitamin D 23297 UNIT capsule    ERGOCALCIFEROL    36 capsule    Take one capsule by  mouth every Monday, Wednesday and Friday    Vitamin D deficiency

## 2017-11-20 NOTE — PATIENT INSTRUCTIONS
ASSESSMENT/PLAN:       1. Type 2 diabetes mellitus without complication, with long-term current use of insulin (H)  uncontrolled  - start exenatide ER (BYDUREON) 2 MG recon vial kit susp for weekly inj; Inject 2 mg Subcutaneous once a week  Dispense: 4 kit; Refill: 11  - increase insulin glargine (LANTUS SOLOSTAR) 100 UNIT/ML injection; Inject 40 Units Subcutaneous At Bedtime  Dispense: 15 mL; Refill: 11  - Hemoglobin A1c  - *UA reflex to Microscopic and Culture - [facility]  - Albumin Random Urine Quantitative with Creat Ratio  - C FOOT EXAM  NO CHARGE  - continue checking glucose levels, increase lantus to 45 units in one week if fasting readings are over 130.     2. Hyperlipidemia LDL goal <100  chronic  - Lipid Profile    3. Benign essential hypertension  chronic  - TSH with free T4 reflex  - Basic metabolic panel  - continue metoprolol  - increase losartan to 100mg daily    4. Mixed incontinence urge and stress (male)(female)  Continue current plan    5. Mild recurrent major depression (H)  chronic  - DEPRESSION ACTION PLAN (DAP)    6. Anxiety  Continue current plan    7 On statin therapy  - Hepatic panel    8. Recurrent candidiasis of vagina  - fluconazole (DIFLUCAN) 150 MG tablet; Take one tablet once a week  Dispense: 4 tablet; Refill: 5      Flu vaccine updated today    FUTURE APPOINTMENTS:       - Follow-up visit in 1 month    Daja Obrien NP  [facility]

## 2017-11-20 NOTE — NURSING NOTE
Chief Complaint   Patient presents with     Hypertension     Hyperlipidemia     Diabetes     Anxiety     Depression     Screen due       Initial BP (!) 150/96 (BP Location: Right arm, Patient Position: Sitting, Cuff Size: Adult Large)  Pulse 94  Temp 97.3  F (36.3  C) (Tympanic)  Resp 14  Ht 5' (1.524 m)  Wt 170 lb (77.1 kg)  SpO2 97%  BMI 33.2 kg/m2 Estimated body mass index is 33.2 kg/(m^2) as calculated from the following:    Height as of this encounter: 5' (1.524 m).    Weight as of this encounter: 170 lb (77.1 kg).  Medication Reconciliation: complete   Polina Townsend

## 2017-11-20 NOTE — LETTER
My Depression Action Plan  Name: Lili Nava   Date of Birth 1970  Date: 11/20/2017    My doctor: Daja Obrien   My clinic: [facility]  [address]  [place] [address]  [phone]          GREEN    ZONE   Good Control    What it looks like:     Things are going generally well. You have normal up s and down s. You may even feel depressed from time to time, but bad moods usually last less than a day.   What you need to do:  1. Continue to care for yourself (see self care plan)  2. Check your depression survival kit and update it as needed  3. Follow your physician s recommendations including any medication.  4. Do not stop taking medication unless you consult with your physician first.           YELLOW         ZONE Getting Worse    What it looks like:     Depression is starting to interfere with your life.     It may be hard to get out of bed; you may be starting to isolate yourself from others.    Symptoms of depression are starting to last most all day and this has happened for several days.     You may have suicidal thoughts but they are not constant.   What you need to do:     1. Call your care team, your response to treatment will improve if you keep your care team informed of your progress. Yellow periods are signs an adjustment may need to be made.     2. Continue your self-care, even if you have to fake it!    3. Talk to someone in your support network    4. Open up your depression survival kit           RED    ZONE Medical Alert - Get Help    What it looks like:     Depression is seriously interfering with your life.     You may experience these or other symptoms: You can t get out of bed most days, can t work or engage in other necessary activities, you have trouble taking care of basic hygiene, or basic responsibilities, thoughts of suicide or death that will not go away, self-injurious behavior.     What you need to do:  1. Call your care  team and request a same-day appointment. If they are not available (weekends or after hours) call your local crisis line, emergency room or 911.      Electronically signed by: Polina Townsend, November 20, 2017    Depression Self Care Plan / Survival Kit    Self-Care for Depression  Here s the deal. Your body and mind are really not as separate as most people think.  What you do and think affects how you feel and how you feel influences what you do and think. This means if you do things that people who feel good do, it will help you feel better.  Sometimes this is all it takes.  There is also a place for medication and therapy depending on how severe your depression is, so be sure to consult with your medical provider and/ or Behavioral Health Consultant if your symptoms are worsening or not improving.     In order to better manage my stress, I will:    Exercise  Get some form of exercise, every day. This will help reduce pain and release endorphins, the  feel good  chemicals in your brain. This is almost as good as taking antidepressants!  This is not the same as joining a gym and then never going! (they count on that by the way ) It can be as simple as just going for a walk or doing some gardening, anything that will get you moving.      Hygiene   Maintain good hygiene (Get out of bed in the morning, Make your bed, Brush your teeth, Take a shower, and Get dressed like you were going to work, even if you are unemployed).  If your clothes don't fit try to get ones that do.    Diet  I will strive to eat foods that are good for me, drink plenty of water, and avoid excessive sugar, caffeine, alcohol, and other mood-altering substances.  Some foods that are helpful in depression are: complex carbohydrates, B vitamins, flaxseed, fish or fish oil, fresh fruits and vegetables.    Psychotherapy  I agree to participate in Individual Therapy (if recommended).    Medication  If prescribed medications, I agree to take them.   Missing doses can result in serious side effects.  I understand that drinking alcohol, or other illicit drug use, may cause potential side effects.  I will not stop my medication abruptly without first discussing it with my provider.    Staying Connected With Others  I will stay in touch with my friends, family members, and my primary care provider/team.    Use your imagination  Be creative.  We all have a creative side; it doesn t matter if it s oil painting, sand castles, or mud pies! This will also kick up the endorphins.    Witness Beauty  (AKA stop and smell the roses) Take a look outside, even in mid-winter. Notice colors, textures. Watch the squirrels and birds.     Service to others  Be of service to others.  There is always someone else in need.  By helping others we can  get out of ourselves  and remember the really important things.  This also provides opportunities for practicing all the other parts of the program.    Humor  Laugh and be silly!  Adjust your TV habits for less news and crime-drama and more comedy.    Control your stress  Try breathing deep, massage therapy, biofeedback, and meditation. Find time to relax each day.     My support system    Clinic Contact:  Phone number:    Contact 1:  Phone number:    Contact 2:  Phone number:    Christianity/:  Phone number:    Therapist:  Phone number:    Local crisis center:    Phone number:    Other community support:  Phone number:

## 2017-11-21 LAB
CREAT UR-MCNC: 94 MG/DL
MICROALBUMIN UR-MCNC: 36 MG/L
MICROALBUMIN/CREAT UR: 37.73 MG/G CR (ref 0–25)

## 2017-11-21 ASSESSMENT — ANXIETY QUESTIONNAIRES: GAD7 TOTAL SCORE: 8

## 2017-11-28 ENCOUNTER — MYC REFILL (OUTPATIENT)
Dept: FAMILY MEDICINE | Facility: OTHER | Age: 47
End: 2017-11-28

## 2017-11-28 DIAGNOSIS — I10 BENIGN ESSENTIAL HYPERTENSION: ICD-10-CM

## 2017-11-28 DIAGNOSIS — F41.9 ANXIETY: ICD-10-CM

## 2017-11-28 DIAGNOSIS — G44.209 TENSION HEADACHE: ICD-10-CM

## 2017-11-28 DIAGNOSIS — E11.42 DIABETIC POLYNEUROPATHY ASSOCIATED WITH TYPE 2 DIABETES MELLITUS (H): ICD-10-CM

## 2017-11-28 DIAGNOSIS — G89.29 CHRONIC BILATERAL LOW BACK PAIN WITH SCIATICA, SCIATICA LATERALITY UNSPECIFIED: ICD-10-CM

## 2017-11-28 DIAGNOSIS — E11.9 TYPE 2 DIABETES MELLITUS WITHOUT COMPLICATION, WITHOUT LONG-TERM CURRENT USE OF INSULIN (H): ICD-10-CM

## 2017-11-28 DIAGNOSIS — M54.40 CHRONIC BILATERAL LOW BACK PAIN WITH SCIATICA, SCIATICA LATERALITY UNSPECIFIED: ICD-10-CM

## 2017-11-28 DIAGNOSIS — N39.46 MIXED INCONTINENCE URGE AND STRESS (MALE)(FEMALE): ICD-10-CM

## 2017-11-29 NOTE — TELEPHONE ENCOUNTER
Ditropan-XL      Last Written Prescription Date: 5/4/17  Last Fill Quantity: 30,  # refills: 5   Last Office Visit with [facility], [facility] or [facility] prescribing provider: 11/20/17                                         Next 5 appointments (look out 90 days)     Dec 20, 2017 10:00 AM CST   (Arrive by 9:45 AM)   SHORT with Daja Obrien NP   [facility] ([facility]    [address]  [place] [address]   [phone]                  Metformin      Last Written Prescription Date: 4/24/17  Last Fill Quantity: 180,  # refills: 1   Last Office Visit with [facility], [facility] or [facility] prescribing provider: 11/20/17                                         Next 5 appointments (look out 90 days)     Dec 20, 2017 10:00 AM CST   (Arrive by 9:45 AM)   SHORT with Daja Obrien NP   [facility]    [address]  [place] [address]   [phone]                  Toprol-XL      Last Written Prescription Date: 4/12/17  Last Fill Quantity: 30,  # refills: 5   Last Office Visit with [facility], [facility] or [facility] prescribing provider: 11/20/17                                         Next 5 appointments (look out 90 days)     Dec 20, 2017 10:00 AM CST   (Arrive by 9:45 AM)   SHORT with Daja Obrien NP   [facility]    [address]  [place] [address]   [phone]

## 2017-12-01 RX ORDER — METFORMIN HCL 500 MG
TABLET, EXTENDED RELEASE 24 HR ORAL
Qty: 180 TABLET | Refills: 0 | Status: SHIPPED | OUTPATIENT
Start: 2017-12-01 | End: 2018-03-02

## 2017-12-01 RX ORDER — METOPROLOL SUCCINATE 25 MG/1
TABLET, EXTENDED RELEASE ORAL
Qty: 30 TABLET | Refills: 0 | Status: SHIPPED | OUTPATIENT
Start: 2017-12-01 | End: 2018-01-04

## 2017-12-01 RX ORDER — OXYBUTYNIN CHLORIDE 10 MG/1
TABLET, EXTENDED RELEASE ORAL
Qty: 30 TABLET | Refills: 11 | Status: SHIPPED | OUTPATIENT
Start: 2017-12-01 | End: 2018-11-20

## 2017-12-11 ENCOUNTER — TELEPHONE (OUTPATIENT)
Dept: FAMILY MEDICINE | Facility: OTHER | Age: 47
End: 2017-12-11

## 2017-12-11 DIAGNOSIS — L30.4 INTERTRIGO: Primary | ICD-10-CM

## 2017-12-11 RX ORDER — NYSTATIN 100000 U/G
CREAM TOPICAL 3 TIMES DAILY
Qty: 60 G | Refills: 2 | Status: SHIPPED | OUTPATIENT
Start: 2017-12-11 | End: 2017-12-25

## 2017-12-11 NOTE — TELEPHONE ENCOUNTER
Pt called and has red inflaimed rash under right breast with some weeping and odor put in for Thursday appointment. At 1400 any suggestions until then?  Pamela M Lechevalier LPN

## 2017-12-18 ENCOUNTER — MYC MEDICAL ADVICE (OUTPATIENT)
Dept: FAMILY MEDICINE | Facility: OTHER | Age: 47
End: 2017-12-18

## 2017-12-28 DIAGNOSIS — E87.6 HYPOKALEMIA: ICD-10-CM

## 2017-12-28 RX ORDER — POTASSIUM CHLORIDE 1500 MG/1
TABLET, EXTENDED RELEASE ORAL
Qty: 30 TABLET | Refills: 3 | Status: SHIPPED | OUTPATIENT
Start: 2017-12-28 | End: 2018-05-01

## 2018-01-23 NOTE — TELEPHONE ENCOUNTER
Please contact patient, it looks like these requests were made in November, then resubmitted in December.  I'm not sure why they weren't addressed, but does patient still need these medications?

## 2018-01-23 NOTE — TELEPHONE ENCOUNTER
Please note my chart message is from 11/28/17.  Please advise.  Thank you.  PCP Shashank.    Ibuprofen 800mg  Last office visit: 11/20/17  Last refill: 8/14/17 #90, 1 R.    Gabapentin   Last refill: 5/4/17 #90, 0R  Thank you.

## 2018-01-31 RX ORDER — IBUPROFEN 800 MG/1
800 TABLET, FILM COATED ORAL EVERY 8 HOURS PRN
Qty: 90 TABLET | Refills: 0 | Status: SHIPPED | OUTPATIENT
Start: 2018-01-31 | End: 2018-05-21

## 2018-01-31 RX ORDER — GABAPENTIN 300 MG/1
CAPSULE ORAL
Qty: 90 CAPSULE | Refills: 0 | Status: SHIPPED | OUTPATIENT
Start: 2018-01-31

## 2018-01-31 NOTE — TELEPHONE ENCOUNTER
Neurontin last filled on 8.14.17 #90 with 1 refill. Advil last filled on 5.4.17 #90. Last office visit 11.20.17.

## 2018-02-07 ENCOUNTER — TELEPHONE (OUTPATIENT)
Dept: FAMILY MEDICINE | Facility: OTHER | Age: 48
End: 2018-02-07

## 2018-02-07 DIAGNOSIS — Z79.4 TYPE 2 DIABETES MELLITUS WITHOUT COMPLICATION, WITH LONG-TERM CURRENT USE OF INSULIN (H): Primary | ICD-10-CM

## 2018-02-07 DIAGNOSIS — E11.9 TYPE 2 DIABETES MELLITUS WITHOUT COMPLICATION, WITH LONG-TERM CURRENT USE OF INSULIN (H): Primary | ICD-10-CM

## 2018-02-07 RX ORDER — INSULIN GLARGINE 100 [IU]/ML
40 INJECTION, SOLUTION SUBCUTANEOUS AT BEDTIME
Qty: 15 ML | Refills: 3 | Status: SHIPPED | OUTPATIENT
Start: 2018-02-07 | End: 2018-02-12

## 2018-02-12 ENCOUNTER — OFFICE VISIT (OUTPATIENT)
Dept: FAMILY MEDICINE | Facility: OTHER | Age: 48
End: 2018-02-12
Attending: NURSE PRACTITIONER
Payer: COMMERCIAL

## 2018-02-12 VITALS
TEMPERATURE: 98.6 F | BODY MASS INDEX: 33.38 KG/M2 | WEIGHT: 170 LBS | HEIGHT: 60 IN | SYSTOLIC BLOOD PRESSURE: 138 MMHG | RESPIRATION RATE: 18 BRPM | HEART RATE: 90 BPM | DIASTOLIC BLOOD PRESSURE: 85 MMHG | OXYGEN SATURATION: 98 %

## 2018-02-12 DIAGNOSIS — I10 BENIGN ESSENTIAL HYPERTENSION: ICD-10-CM

## 2018-02-12 DIAGNOSIS — Z79.4 TYPE 2 DIABETES MELLITUS WITHOUT COMPLICATION, WITH LONG-TERM CURRENT USE OF INSULIN (H): Primary | ICD-10-CM

## 2018-02-12 DIAGNOSIS — E11.9 TYPE 2 DIABETES MELLITUS WITHOUT COMPLICATION, WITH LONG-TERM CURRENT USE OF INSULIN (H): Primary | ICD-10-CM

## 2018-02-12 PROCEDURE — G0463 HOSPITAL OUTPT CLINIC VISIT: HCPCS

## 2018-02-12 PROCEDURE — 99213 OFFICE O/P EST LOW 20 MIN: CPT | Performed by: NURSE PRACTITIONER

## 2018-02-12 RX ORDER — INSULIN GLARGINE 100 [IU]/ML
45 INJECTION, SOLUTION SUBCUTANEOUS 2 TIMES DAILY
Qty: 15 ML | Refills: 3 | Status: SHIPPED | OUTPATIENT
Start: 2018-02-12 | End: 2018-05-18

## 2018-02-12 RX ORDER — METOPROLOL SUCCINATE 50 MG/1
50 TABLET, EXTENDED RELEASE ORAL DAILY
Qty: 90 TABLET | Refills: 1 | Status: SHIPPED | OUTPATIENT
Start: 2018-02-12 | End: 2018-05-18

## 2018-02-12 ASSESSMENT — ANXIETY QUESTIONNAIRES
5. BEING SO RESTLESS THAT IT IS HARD TO SIT STILL: SEVERAL DAYS
3. WORRYING TOO MUCH ABOUT DIFFERENT THINGS: SEVERAL DAYS
IF YOU CHECKED OFF ANY PROBLEMS ON THIS QUESTIONNAIRE, HOW DIFFICULT HAVE THESE PROBLEMS MADE IT FOR YOU TO DO YOUR WORK, TAKE CARE OF THINGS AT HOME, OR GET ALONG WITH OTHER PEOPLE: SOMEWHAT DIFFICULT
2. NOT BEING ABLE TO STOP OR CONTROL WORRYING: SEVERAL DAYS
6. BECOMING EASILY ANNOYED OR IRRITABLE: SEVERAL DAYS
1. FEELING NERVOUS, ANXIOUS, OR ON EDGE: SEVERAL DAYS
4. TROUBLE RELAXING: SEVERAL DAYS
7. FEELING AFRAID AS IF SOMETHING AWFUL MIGHT HAPPEN: SEVERAL DAYS
GAD7 TOTAL SCORE: 7

## 2018-02-12 ASSESSMENT — PAIN SCALES - GENERAL: PAINLEVEL: SEVERE PAIN (6)

## 2018-02-12 NOTE — PROGRESS NOTES
Chief Complaint   Patient presents with     Hypertension     blood pressure check        Initial /85  Pulse 90  Temp 98.6  F (37  C) (Tympanic)  Resp 18  Ht 5' (1.524 m)  Wt 170 lb (77.1 kg)  SpO2 98%  BMI 33.2 kg/m2 Estimated body mass index is 33.2 kg/(m^2) as calculated from the following:    Height as of this encounter: 5' (1.524 m).    Weight as of this encounter: 170 lb (77.1 kg).  Medication Reconciliation: complete     Sharda Garcia LPN

## 2018-02-12 NOTE — PROGRESS NOTES
SUBJECTIVE:   Lili Nava is a 47 year old female who presents to clinic today for the following health issues:      Hypertension Follow-up      Outpatient blood pressures are being checked at home PRN, improved. .    Low Salt Diet: no added salt      Amount of exercise or physical activity: 4-5 days/week for an average of 45-60 minutes    Problems taking medications regularly: No    Medication side effects: none    Diet: low salt        Diabetes Follow-up      Patient is checking blood sugars: daily    Diabetic concerns: elevated glucose levels.  Is taking 40 units basalglar, and bydureon.     Symptoms of hypoglycemia (low blood sugar): as above     Paresthesias (numbness or burning in feet) or sores: yes, chronic     Date of last diabetic eye exam: current    BP Readings from Last 2 Encounters:   02/12/18 138/85   11/20/17 (!) 150/96     Hemoglobin A1C (%)   Date Value   11/20/2017 9.7 (H)   08/14/2017 8.5 (H)     LDL Cholesterol Calculated (mg/dL)   Date Value   11/20/2017 93   08/14/2017 97       Problem list and histories reviewed & adjusted, as indicated.  Additional history: as documented    Patient Active Problem List   Diagnosis     Type 2 diabetes mellitus without complication, without long-term current use of insulin (H)     Benign essential hypertension     Anxiety     Mild recurrent major depression (H)     Post traumatic stress disorder     Diabetic neuropathy associated with type 2 diabetes mellitus (H)     Primary insomnia     Eczema     Vitamin D deficiency     Mixed incontinence urge and stress (male)(female)     Hyperlipidemia LDL goal <100     Past Surgical History:   Procedure Laterality Date     COLONOSCOPY  01/01/2013     HYSTERECTOMY         Social History   Substance Use Topics     Smoking status: Former Smoker     Smokeless tobacco: Never Used     Alcohol use Yes      Comment: rare     Family History   Problem Relation Age of Onset     Hypertension Mother      Coronary Artery  Disease Father          Current Outpatient Prescriptions   Medication Sig Dispense Refill     metoprolol succinate (TOPROL-XL) 25 MG 24 hr tablet TAKE 1 TABLET BY MOUTH DAILY 30 tablet 0     BASAGLAR 100 UNIT/ML injection Inject 40 Units Subcutaneous At Bedtime 15 mL 3     gabapentin (NEURONTIN) 300 MG capsule Take 1 tablet (300 mg) every night for 1-3 days, then 1 tablet twice daily for 1-3 days, then 1 tablet three times daily 90 capsule 0     ibuprofen (ADVIL/MOTRIN) 800 MG tablet Take 1 tablet (800 mg) by mouth every 8 hours as needed for moderate pain 90 tablet 0     potassium chloride SA (K-DUR/KLOR-CON M) 20 MEQ CR tablet TAKE 1 TABLET BY MOUTH EVERY DAY 30 tablet 3     oxybutynin (DITROPAN-XL) 10 MG 24 hr tablet TAKE 1 TABLET BY MOUTH DAILY 30 tablet 11     metFORMIN (GLUCOPHAGE-XR) 500 MG 24 hr tablet TAKE 1 TABLET BY MOUTH TWICE A DAY WITH MEALS 180 tablet 0     exenatide ER (BYDUREON) 2 MG recon vial kit susp for weekly inj Inject 2 mg Subcutaneous once a week 4 kit 11     losartan (COZAAR) 100 MG tablet Take 1 tablet (100 mg) by mouth daily 30 tablet 5     fluconazole (DIFLUCAN) 150 MG tablet Take one tablet once a week 4 tablet 5     simvastatin (ZOCOR) 10 MG tablet TAKE 1 TABLET BY MOUTH NIGHTLY AT BEDTIME 90 tablet 1     blood glucose monitoring (ONE TOUCH ULTRA MINI) meter device kit Use to test blood sugars 2 times daily or as directed. 1 kit 0     blood glucose monitoring (NO BRAND SPECIFIED) test strip Use to test blood sugars 2 times daily or as directed 100 each 11     blood glucose (NO BRAND SPECIFIED) lancets standard Use to test blood sugar once times daily or as directed. 100 each 11     insulin pen needle 31G X 6 MM Use 1 daily or as directed. 100 each prn     order for DME Blood pressure monitor 1 each 0     VITAMIN D, CHOLECALCIFEROL, PO Take 2,000 Units by mouth daily       escitalopram (LEXAPRO) 20 MG tablet Take 1 tablet (20 mg) by mouth daily 30 tablet 5     vitamin D (ERGOCALCIFEROL)  70745 UNIT capsule Take one capsule by mouth every Monday, Wednesday and Friday 36 capsule 0     hydrOXYzine (ATARAX) 25 MG tablet Take 1-2 tablets (25-50 mg) by mouth every 6 hours as needed for anxiety (or insomnia) 60 tablet 1     Allergies   Allergen Reactions     Celexa [Citalopram] Other (See Comments)     jittery     Lisinopril Cough     Recent Labs   Lab Test  11/20/17   1112  08/14/17   1054   04/20/17   1552   A1C  9.7*  8.5*   --   7.6*   LDL  93  97   --   84   HDL  49*  54   --   51   TRIG  132  78   --   167*   ALT  36  77*   --   41   CR  0.53  0.54   < >  0.65   GFRESTIMATED  >90  >90  Non African American GFR Calc     --   >90  Non  GFR Calc     GFRESTBLACK  >90  >90  African American GFR Calc     --   >90   GFR Calc     POTASSIUM  3.9  4.0   < >  4.1   TSH  2.98  0.37*   --   1.14    < > = values in this interval not displayed.      BP Readings from Last 3 Encounters:   02/12/18 138/85   11/20/17 (!) 150/96   08/14/17 (!) 144/98    Wt Readings from Last 3 Encounters:   02/12/18 170 lb (77.1 kg)   11/20/17 170 lb (77.1 kg)   08/14/17 173 lb 3.2 oz (78.6 kg)                    Reviewed and updated as needed this visit by clinical staff  Tobacco  Allergies  Meds  Problems       Reviewed and updated as needed this visit by Provider         ROS:  Constitutional, HEENT, cardiovascular, pulmonary, gi and gu systems are negative, except as otherwise noted.    OBJECTIVE:     /85  Pulse 90  Temp 98.6  F (37  C) (Tympanic)  Resp 18  Ht 5' (1.524 m)  Wt 170 lb (77.1 kg)  SpO2 98%  BMI 33.2 kg/m2  Body mass index is 33.2 kg/(m^2).  GENERAL: alert, no distress and over weight  NECK: no adenopathy, no asymmetry, masses, or scars, thyroid normal to palpation and no carotid bruits  RESP: lungs clear to auscultation - no rales, rhonchi or wheezes  CV: regular rate and rhythm, normal S1 S2, no S3 or S4, no murmur, click or rub, no peripheral edema and peripheral pulses  strong  MS: no gross musculoskeletal defects noted, no edema  PSYCH: mentation appears normal, affect normal/bright        ASSESSMENT/PLAN:       1. Type 2 diabetes mellitus without complication, without long-term current use of insulin (H)  Elevated glucose  - increase BASAGLAR 100 UNIT/ML injection; Inject 45 Units Subcutaneous 2 times daily  Dispense: 15 mL; Refill: 3  - increase by 5 units each dose (twice daily) every 5 days until fasting readings are down around 130mg/dl    3. Benign essential hypertension  Continue losartan 100mg daily  - increase metoprolol succinate (TOPROL-XL) 50 MG 24 hr tablet; Take 1 tablet (50 mg) by mouth daily  Dispense: 90 tablet; Refill: 1    FUTURE APPOINTMENTS:       - Follow-up visit in 1 month or as needed for acute concerns.  Labs will be due at that time.     Daja Obrien, NP  [facility]

## 2018-02-12 NOTE — MR AVS SNAPSHOT
After Visit Summary   2/12/2018    Lili Nava    MRN: [id number]           Patient Information     Date Of Birth          1970        Visit Information        Provider Department      2/12/2018 10:00 AM Daja Obrien NP [facility]        Today's Diagnoses     Type 2 diabetes mellitus without complication, with long-term current use of insulin (H)    -  1    Benign essential hypertension          Care Instructions        ASSESSMENT/PLAN:       1. Type 2 diabetes mellitus without complication, without long-term current use of insulin (H)  Elevated glucose  - increase BASAGLAR 100 UNIT/ML injection; Inject 45 Units Subcutaneous 2 times daily  Dispense: 15 mL; Refill: 3  - increase by 5 units each dose twice daily every 5 days until fasting readings are down around 130mg/dl    3. Benign essential hypertension  Continue losartan 100mg daily  - increase metoprolol succinate (TOPROL-XL) 50 MG 24 hr tablet; Take 1 tablet (50 mg) by mouth daily  Dispense: 90 tablet; Refill: 1    FUTURE APPOINTMENTS:       - Follow-up visit in 1 month or as needed for acute concerns.     Daja Obrien NP  [facility]              Follow-ups after your visit        Follow-up notes from your care team     Return in about 4 weeks (around 3/12/2018), or if symptoms worsen or fail to improve.      Your next 10 appointments already scheduled     Mar 12, 2018  9:30 AM CDT   (Arrive by 9:15 AM)   SHORT with Daja Obrien NP   [facility] ([facility] )    [address]  [place] [address]   [phone]              Who to contact     If you have questions or need follow up information about today's clinic visit or your schedule please contact [facility] directly at [phone].  Normal or non-critical lab and imaging results will be communicated to you by MyChart, letter or phone within 4 business  days after the clinic has received the results. If you do not hear from us within 7 days, please contact the clinic through [email or web address] or phone. If you have a critical or abnormal lab result, we will notify you by phone as soon as possible.  Submit refill requests through [email or web address] or call your pharmacy and they will forward the refill request to us. Please allow 3 business days for your refill to be completed.          Additional Information About Your Visit        [email or web address] Information     [email or web address] gives you secure access to your electronic health record. If you see a primary care provider, you can also send messages to your care team and make appointments. If you have questions, please call your primary care clinic.  If you do not have a primary care provider, please call [phone] and they will assist you.        Care EveryWhere ID     This is your Care EveryWhere ID. This could be used by other organizations to access your [facility] medical records  [id number]        Your Vitals Were     Pulse Temperature Respirations Height Pulse Oximetry BMI (Body Mass Index)    90 98.6  F (37  C) (Tympanic) 18 5' (1.524 m) 98% 33.2 kg/m2       Blood Pressure from Last 3 Encounters:   02/12/18 138/85   11/20/17 (!) 150/96   08/14/17 (!) 144/98    Weight from Last 3 Encounters:   02/12/18 170 lb (77.1 kg)   11/20/17 170 lb (77.1 kg)   08/14/17 173 lb 3.2 oz (78.6 kg)              Today, you had the following     No orders found for display         Today's Medication Changes          These changes are accurate as of 2/12/18 12:11 PM.  If you have any questions, ask your nurse or doctor.               These medicines have changed or have updated prescriptions.        Dose/Directions    BASAGLAR 100 UNIT/ML injection   This may have changed:    - how much to take  - when to take this   Used for:  Type 2 diabetes mellitus without complication, with long-term current use of insulin (H)   Changed by:  Daja Obrien NP         Dose:  45 Units   Inject 45 Units Subcutaneous 2 times daily   Quantity:  15 mL   Refills:  3       metoprolol succinate 50 MG 24 hr tablet   Commonly known as:  TOPROL-XL   This may have changed:  See the new instructions.   Used for:  Benign essential hypertension   Changed by:  Daja Obrien NP        Dose:  50 mg   Take 1 tablet (50 mg) by mouth daily   Quantity:  90 tablet   Refills:  1            Where to get your medicines      These medications were sent to [facility] - [place], [place] - [address]  [address] [address]     Phone:  [phone]     BASAGLAR 100 UNIT/ML injection    metoprolol succinate 50 MG 24 hr tablet                Primary Care Provider Office Phone # Fax #    Daja Obrien [phone] [address] [facility] [place] [address]        Equal Access to Services     [facility] : Hadii angeline paz hadasho Soomaali, waaxda luqadaha, qaybta kaalmada adeegyada, waxay idiin haybernard duffy . So [facility] [phone].    ATENCIÓN: Si habla español, tiene a mullins disposición servicios gratuitos de asistencia lingüística. Llame al [phone].    We comply with applicable federal civil rights laws and [place] laws. We do not discriminate on the basis of race, color, national origin, age, disability, sex, sexual orientation, or gender identity.            Thank you!     Thank you for choosing [facility]  for your care. Our goal is always to provide you with excellent care. Hearing back from our patients is one way we can continue to improve our services. Please take a few minutes to complete the written survey that you may receive in the mail after your visit with us. Thank you!             Your Updated Medication List - Protect others around you: Learn how to safely use, store and throw away your medicines at www.disposemymeds.org.          This list is accurate as of 2/12/18 12:11 PM.  Always  use your most recent med list.                   Brand Name Dispense Instructions for use Diagnosis    aspirin 81 MG EC tablet     90 tablet    Take 1 tablet (81 mg) by mouth daily    Type 2 diabetes mellitus without complication, with long-term current use of insulin (H), Benign essential hypertension       BASAGLAR 100 UNIT/ML injection     15 mL    Inject 45 Units Subcutaneous 2 times daily    Type 2 diabetes mellitus without complication, with long-term current use of insulin (H)       blood glucose lancets standard    no brand specified    100 each    Use to test blood sugar once times daily or as directed.    Type 2 diabetes mellitus without complication, without long-term current use of insulin (H)       blood glucose monitoring meter device kit     1 kit    Use to test blood sugars 2 times daily or as directed.    Type 2 diabetes mellitus without complication, without long-term current use of insulin (H)       blood glucose monitoring test strip    no brand specified    100 each    Use to test blood sugars 2 times daily or as directed    Type 2 diabetes mellitus without complication, without long-term current use of insulin (H)       escitalopram 20 MG tablet    LEXAPRO    30 tablet    Take 1 tablet (20 mg) by mouth daily    Anxiety       exenatide ER 2 MG recon vial kit susp for weekly inj    BYDUREON    4 kit    Inject 2 mg Subcutaneous once a week    Type 2 diabetes mellitus without complication, with long-term current use of insulin (H)       fluconazole 150 MG tablet    DIFLUCAN    4 tablet    Take one tablet once a week    Recurrent candidiasis of vagina       gabapentin 300 MG capsule    NEURONTIN    90 capsule    Take 1 tablet (300 mg) every night for 1-3 days, then 1 tablet twice daily for 1-3 days, then 1 tablet three times daily    Anxiety, Diabetic polyneuropathy associated with type 2 diabetes mellitus (H), Chronic bilateral low back pain with sciatica, sciatica laterality unspecified        hydrOXYzine 25 MG tablet    ATARAX    60 tablet    Take 1-2 tablets (25-50 mg) by mouth every 6 hours as needed for anxiety (or insomnia)    Anxiety       ibuprofen 800 MG tablet    ADVIL/MOTRIN    90 tablet    Take 1 tablet (800 mg) by mouth every 8 hours as needed for moderate pain    Tension headache       insulin pen needle 31G X 6 MM     100 each    Use 1 daily or as directed.    Type 2 diabetes mellitus without complication, without long-term current use of insulin (H)       losartan 100 MG tablet    COZAAR    30 tablet    Take 1 tablet (100 mg) by mouth daily    Benign essential hypertension       metFORMIN 500 MG 24 hr tablet    GLUCOPHAGE-XR    180 tablet    TAKE 1 TABLET BY MOUTH TWICE A DAY WITH MEALS    Type 2 diabetes mellitus without complication, without long-term current use of insulin (H)       metoprolol succinate 50 MG 24 hr tablet    TOPROL-XL    90 tablet    Take 1 tablet (50 mg) by mouth daily    Benign essential hypertension       order for DME     1 each    Blood pressure monitor    Benign essential hypertension       oxybutynin 10 MG 24 hr tablet    DITROPAN-XL    30 tablet    TAKE 1 TABLET BY MOUTH DAILY    Mixed incontinence urge and stress (male)(female)       potassium chloride SA 20 MEQ CR tablet    K-DUR/KLOR-CON M    30 tablet    TAKE 1 TABLET BY MOUTH EVERY DAY    Hypokalemia       simvastatin 10 MG tablet    ZOCOR    90 tablet    TAKE 1 TABLET BY MOUTH NIGHTLY AT BEDTIME    Type 2 diabetes mellitus without complication (H)       VITAMIN D (CHOLECALCIFEROL) PO      Take 2,000 Units by mouth daily        vitamin D 97552 UNIT capsule    ERGOCALCIFEROL    36 capsule    Take one capsule by mouth every Monday, Wednesday and Friday    Vitamin D deficiency

## 2018-02-12 NOTE — PATIENT INSTRUCTIONS
ASSESSMENT/PLAN:       1. Type 2 diabetes mellitus without complication, without long-term current use of insulin (H)  Elevated glucose  - increase BASAGLAR 100 UNIT/ML injection; Inject 45 Units Subcutaneous 2 times daily  Dispense: 15 mL; Refill: 3  - increase by 5 units each dose twice daily every 5 days until fasting readings are down around 130mg/dl    3. Benign essential hypertension  Continue losartan 100mg daily  - increase metoprolol succinate (TOPROL-XL) 50 MG 24 hr tablet; Take 1 tablet (50 mg) by mouth daily  Dispense: 90 tablet; Refill: 1    FUTURE APPOINTMENTS:       - Follow-up visit in 1 month or as needed for acute concerns.     Daja Obrien NP  [facility]

## 2018-02-13 ASSESSMENT — PATIENT HEALTH QUESTIONNAIRE - PHQ9: SUM OF ALL RESPONSES TO PHQ QUESTIONS 1-9: 10

## 2018-02-13 ASSESSMENT — ANXIETY QUESTIONNAIRES: GAD7 TOTAL SCORE: 7

## 2018-04-02 ENCOUNTER — TELEPHONE (OUTPATIENT)
Dept: FAMILY MEDICINE | Facility: OTHER | Age: 48
End: 2018-04-02

## 2018-04-02 NOTE — TELEPHONE ENCOUNTER
Called patient and did PHQ-9 and score is 23. She is struggling with teenage daughter.She denies hurting herself or thoughts of suicide. She uses Atarax. She left work d/t this today and work said to get some help.Please advise.

## 2018-04-02 NOTE — TELEPHONE ENCOUNTER
4:46 PM    Reason for Call: Phone Call    Description: Pt requests a doctor's note for missing work on 04/02/18 and will be missing work on 04/03/18 and likely 04/04/18 due to severe depression. Would like to pickup note at [facility]  on 04/04/18. Please give her a call when it is ready.    Was an appointment offered for this call? Yes  If yes : Appointment type              Date 04/06/18 appt made    Preferred method for responding to this message: Telephone Call  What is your phone number ? [phone]    If we cannot reach you directly, may we leave a detailed response at the number you provided? Yes    Can this message wait until your PCP/provider returns, if available today? Not applicable, provider is in    [place]ar Jose

## 2018-04-04 ENCOUNTER — OFFICE VISIT (OUTPATIENT)
Dept: FAMILY MEDICINE | Facility: OTHER | Age: 48
End: 2018-04-04
Attending: NURSE PRACTITIONER
Payer: COMMERCIAL

## 2018-04-04 VITALS
RESPIRATION RATE: 18 BRPM | OXYGEN SATURATION: 97 % | BODY MASS INDEX: 34.71 KG/M2 | HEIGHT: 60 IN | TEMPERATURE: 98 F | WEIGHT: 176.8 LBS | HEART RATE: 83 BPM | DIASTOLIC BLOOD PRESSURE: 100 MMHG | SYSTOLIC BLOOD PRESSURE: 146 MMHG

## 2018-04-04 DIAGNOSIS — Z79.4 TYPE 2 DIABETES MELLITUS WITHOUT COMPLICATION, WITH LONG-TERM CURRENT USE OF INSULIN (H): ICD-10-CM

## 2018-04-04 DIAGNOSIS — E11.9 TYPE 2 DIABETES MELLITUS WITHOUT COMPLICATION, WITH LONG-TERM CURRENT USE OF INSULIN (H): ICD-10-CM

## 2018-04-04 DIAGNOSIS — F41.9 ANXIETY: ICD-10-CM

## 2018-04-04 DIAGNOSIS — F33.0 MILD RECURRENT MAJOR DEPRESSION (H): Primary | ICD-10-CM

## 2018-04-04 PROCEDURE — 99214 OFFICE O/P EST MOD 30 MIN: CPT | Performed by: NURSE PRACTITIONER

## 2018-04-04 PROCEDURE — G0463 HOSPITAL OUTPT CLINIC VISIT: HCPCS

## 2018-04-04 RX ORDER — VENLAFAXINE HYDROCHLORIDE 75 MG/1
75 CAPSULE, EXTENDED RELEASE ORAL DAILY
Qty: 30 CAPSULE | Refills: 1 | Status: SHIPPED | OUTPATIENT
Start: 2018-04-04 | End: 2018-05-01

## 2018-04-04 ASSESSMENT — ANXIETY QUESTIONNAIRES
2. NOT BEING ABLE TO STOP OR CONTROL WORRYING: MORE THAN HALF THE DAYS
5. BEING SO RESTLESS THAT IT IS HARD TO SIT STILL: MORE THAN HALF THE DAYS
4. TROUBLE RELAXING: MORE THAN HALF THE DAYS
3. WORRYING TOO MUCH ABOUT DIFFERENT THINGS: NEARLY EVERY DAY
GAD7 TOTAL SCORE: 15
6. BECOMING EASILY ANNOYED OR IRRITABLE: MORE THAN HALF THE DAYS
7. FEELING AFRAID AS IF SOMETHING AWFUL MIGHT HAPPEN: MORE THAN HALF THE DAYS
1. FEELING NERVOUS, ANXIOUS, OR ON EDGE: MORE THAN HALF THE DAYS
IF YOU CHECKED OFF ANY PROBLEMS ON THIS QUESTIONNAIRE, HOW DIFFICULT HAVE THESE PROBLEMS MADE IT FOR YOU TO DO YOUR WORK, TAKE CARE OF THINGS AT HOME, OR GET ALONG WITH OTHER PEOPLE: SOMEWHAT DIFFICULT

## 2018-04-04 ASSESSMENT — PAIN SCALES - GENERAL: PAINLEVEL: SEVERE PAIN (7)

## 2018-04-04 NOTE — PROGRESS NOTES
SUBJECTIVE:   Lili Nava is a 47 year old female who presents to clinic today for the following health issues:  Increased depression and anxiety    Depression and Anxiety Follow-Up    Status since last visit: Worsened     Other associated symptoms:headache nausea exhausted worried     Complicating factors:     Significant life event: Yes-  Problems with daughter and at work - she is having trouble concentrating at work.  Requesting to only work 20 hours per week.        Current substance abuse: None    She is restarting counseling at [facility].      PHQ-9 11/20/2017 2/12/2018 4/4/2018   Total Score 13 10 18   Q9: Suicide Ideation Nearly every day Not at all Not at all     [place]-7 SCORE 11/20/2017 2/12/2018 4/4/2018   Total Score - - -   Total Score 8 7 15       PHQ-9  English  PHQ-9   Any Language  [place]-7  Suicide Assessment Five-step Evaluation and Treatment (SAFE-T)    Amount of exercise or physical activity: 6-7 days/week for an average of 45-60 minutes    Problems taking medications regularly: No    Medication side effects: none    Diet: regular (no restrictions)        She needs a refill of bydureon.  States readings are doing ok.      Problem list and histories reviewed & adjusted, as indicated.  Additional history: as documented    Patient Active Problem List   Diagnosis     Type 2 diabetes mellitus without complication, without long-term current use of insulin (H)     Benign essential hypertension     Anxiety     Mild recurrent major depression (H)     Post traumatic stress disorder     Diabetic neuropathy associated with type 2 diabetes mellitus (H)     Primary insomnia     Eczema     Vitamin D deficiency     Mixed incontinence urge and stress (male)(female)     Hyperlipidemia LDL goal <100     Past Surgical History:   Procedure Laterality Date     COLONOSCOPY  01/01/2013     HYSTERECTOMY         Social History   Substance Use Topics     Smoking status: Former Smoker     Smokeless tobacco:  Never Used     Alcohol use Yes      Comment: rare     Family History   Problem Relation Age of Onset     Hypertension Mother      Coronary Artery Disease Father          Current Outpatient Prescriptions   Medication Sig Dispense Refill     metFORMIN (GLUCOPHAGE-XR) 500 MG 24 hr tablet TAKE 1 TABLET BY MOUTH TWICE A DAY WITH MEALS 180 tablet 0     BASAGLAR 100 UNIT/ML injection Inject 45 Units Subcutaneous 2 times daily 15 mL 3     metoprolol succinate (TOPROL-XL) 50 MG 24 hr tablet Take 1 tablet (50 mg) by mouth daily 90 tablet 1     aspirin 81 MG EC tablet Take 1 tablet (81 mg) by mouth daily 90 tablet 3     gabapentin (NEURONTIN) 300 MG capsule Take 1 tablet (300 mg) every night for 1-3 days, then 1 tablet twice daily for 1-3 days, then 1 tablet three times daily 90 capsule 0     ibuprofen (ADVIL/MOTRIN) 800 MG tablet Take 1 tablet (800 mg) by mouth every 8 hours as needed for moderate pain 90 tablet 0     potassium chloride SA (K-DUR/KLOR-CON M) 20 MEQ CR tablet TAKE 1 TABLET BY MOUTH EVERY DAY 30 tablet 3     oxybutynin (DITROPAN-XL) 10 MG 24 hr tablet TAKE 1 TABLET BY MOUTH DAILY 30 tablet 11     exenatide ER (BYDUREON) 2 MG recon vial kit susp for weekly inj Inject 2 mg Subcutaneous once a week 4 kit 11     losartan (COZAAR) 100 MG tablet Take 1 tablet (100 mg) by mouth daily 30 tablet 5     fluconazole (DIFLUCAN) 150 MG tablet Take one tablet once a week 4 tablet 5     simvastatin (ZOCOR) 10 MG tablet TAKE 1 TABLET BY MOUTH NIGHTLY AT BEDTIME 90 tablet 1     blood glucose monitoring (ONE TOUCH ULTRA MINI) meter device kit Use to test blood sugars 2 times daily or as directed. 1 kit 0     blood glucose monitoring (NO BRAND SPECIFIED) test strip Use to test blood sugars 2 times daily or as directed 100 each 11     blood glucose (NO BRAND SPECIFIED) lancets standard Use to test blood sugar once times daily or as directed. 100 each 11     insulin pen needle 31G X 6 MM Use 1 daily or as directed. 100 each prn      order for [facility] Blood pressure monitor 1 each 0     VITAMIN D, CHOLECALCIFEROL, PO Take 2,000 Units by mouth daily       escitalopram (LEXAPRO) 20 MG tablet Take 1 tablet (20 mg) by mouth daily 30 tablet 5     vitamin D (ERGOCALCIFEROL) 88005 UNIT capsule Take one capsule by mouth every Monday, Wednesday and Friday 36 capsule 0     hydrOXYzine (ATARAX) 25 MG tablet Take 1-2 tablets (25-50 mg) by mouth every 6 hours as needed for anxiety (or insomnia) 60 tablet 1     Allergies   Allergen Reactions     Celexa [Citalopram] Other (See Comments)     jittery     Lisinopril Cough     Recent Labs   Lab Test  11/20/17   1112  08/14/17   1054   04/20/17   1552   A1C  9.7*  8.5*   --   7.6*   LDL  93  97   --   84   HDL  49*  54   --   51   TRIG  132  78   --   167*   ALT  36  77*   --   41   CR  0.53  0.54   < >  0.65   GFRESTIMATED  >90  >90  Non African American GFR Calc     --   >90  Non  GFR Calc     GFRESTBLACK  >90  >90  African American GFR Calc     --   >90   GFR Calc     POTASSIUM  3.9  4.0   < >  4.1   TSH  2.98  0.37*   --   1.14    < > = values in this interval not displayed.      BP Readings from Last 3 Encounters:   04/04/18 (!) 146/100   02/12/18 138/85   11/20/17 (!) 150/96    Wt Readings from Last 3 Encounters:   04/04/18 176 lb 12.8 oz (80.2 kg)   02/12/18 170 lb (77.1 kg)   11/20/17 170 lb (77.1 kg)                    Reviewed and updated as needed this visit by clinical staff  Tobacco  Allergies       Reviewed and updated as needed this visit by Provider         ROS:  Constitutional, HEENT, cardiovascular, pulmonary, gi and gu systems are negative, except as otherwise noted.    OBJECTIVE:     BP (!) 146/100 (BP Location: Right arm, Patient Position: Chair, Cuff Size: Adult Large)  Pulse 83  Temp 98  F (36.7  C) (Tympanic)  Resp 18  Ht 5' (1.524 m)  Wt 176 lb 12.8 oz (80.2 kg)  SpO2 97%  BMI 34.53 kg/m2  Body mass index is 34.53 kg/(m^2).  GENERAL: healthy, alert and  no distress  RESP: lungs clear to auscultation - no rales, rhonchi or wheezes  CV: regular rate and rhythm, normal S1 S2, no S3 or S4, no murmur, click or rub, no peripheral edema and peripheral pulses strong  MS: no gross musculoskeletal defects noted, no edema  PSYCH: mentation appears normal, affect normal/bright      ASSESSMENT/PLAN:       1. Mild recurrent major depression (H)  Continue lexapro  - start counseling with [facility] as scheduled  - work note provided  - venlafaxine (EFFEXOR-XR) 75 MG 24 hr capsule; Take 1 capsule (75 mg) by mouth daily  Dispense: 30 capsule; Refill: 1    2. Anxiety  As above  - venlafaxine (EFFEXOR-XR) 75 MG 24 hr capsule; Take 1 capsule (75 mg) by mouth daily  Dispense: 30 capsule; Refill: 1    3. Type 2 diabetes mellitus without complication, with long-term current use of insulin (H)  chronic  - exenatide ER (BYDUREON) 2 MG recon vial kit susp for weekly inj; Inject 2 mg Subcutaneous once a week  Dispense: 4 kit; Refill: 11      FUTURE APPOINTMENTS:       - Follow-up visit in 2 weeks or as needed for acute concerns.     Daja Obrien NP  [facility]

## 2018-04-04 NOTE — PATIENT INSTRUCTIONS
ASSESSMENT/PLAN:       1. Mild recurrent major depression (H)  Continue lexapro  - start counseling with [facility] as scheduled  - work note provided  - venlafaxine (EFFEXOR-XR) 75 MG 24 hr capsule; Take 1 capsule (75 mg) by mouth daily  Dispense: 30 capsule; Refill: 1    2. Anxiety  As above  - venlafaxine (EFFEXOR-XR) 75 MG 24 hr capsule; Take 1 capsule (75 mg) by mouth daily  Dispense: 30 capsule; Refill: 1    FUTURE APPOINTMENTS:       - Follow-up visit in 2 weeks or as needed for acute concerns.     Daja Obrien, NP  [facility]

## 2018-04-04 NOTE — MR AVS SNAPSHOT
After Visit Summary   4/4/2018    Lili Nava    MRN: [id number]           Patient Information     Date Of Birth          1970        Visit Information        Provider Department      4/4/2018 11:00 AM Daja Obrien NP [facility]        Today's Diagnoses     Mild recurrent major depression (H)    -  1    Anxiety          Care Instructions      ASSESSMENT/PLAN:       1. Mild recurrent major depression (H)  Continue lexapro  - start counseling with [facility] as scheduled  - work note provided  - venlafaxine (EFFEXOR-XR) 75 MG 24 hr capsule; Take 1 capsule (75 mg) by mouth daily  Dispense: 30 capsule; Refill: 1    2. Anxiety  As above  - venlafaxine (EFFEXOR-XR) 75 MG 24 hr capsule; Take 1 capsule (75 mg) by mouth daily  Dispense: 30 capsule; Refill: 1    FUTURE APPOINTMENTS:       - Follow-up visit in 2 weeks or as needed for acute concerns.     Daja Obrien NP  [facility]          Follow-ups after your visit        Who to contact     If you have questions or need follow up information about today's clinic visit or your schedule please contact [facility] directly at [phone].  Normal or non-critical lab and imaging results will be communicated to you by MyChart, letter or phone within 4 business days after the clinic has received the results. If you do not hear from us within 7 days, please contact the clinic through UGEhart or phone. If you have a critical or abnormal lab result, we will notify you by phone as soon as possible.  Submit refill requests through [employer] or call your pharmacy and they will forward the refill request to us. Please allow 3 business days for your refill to be completed.          Additional Information About Your Visit        UGEhart Information     [employer] gives you secure access to your electronic health record. If you see a primary care provider, you can also send messages to your care  team and make appointments. If you have questions, please call your primary care clinic.  If you do not have a primary care provider, please call [phone] and they will assist you.        Care EveryWhere ID     This is your Care EveryWhere ID. This could be used by other organizations to access your [place] medical records  [id number]        Your Vitals Were     Pulse Temperature Respirations Height Pulse Oximetry BMI (Body Mass Index)    83 98  F (36.7  C) (Tympanic) 18 5' (1.524 m) 97% 34.53 kg/m2       Blood Pressure from Last 3 Encounters:   04/04/18 (!) 146/100   02/12/18 138/85   11/20/17 (!) 150/96    Weight from Last 3 Encounters:   04/04/18 176 lb 12.8 oz (80.2 kg)   02/12/18 170 lb (77.1 kg)   11/20/17 170 lb (77.1 kg)              Today, you had the following     No orders found for display         Today's Medication Changes          These changes are accurate as of 4/4/18 11:30 AM.  If you have any questions, ask your nurse or doctor.               Start taking these medicines.        Dose/Directions    venlafaxine 75 MG 24 hr capsule   Commonly known as:  EFFEXOR-XR   Used for:  Mild recurrent major depression (H), Anxiety   Started by:  Daja Obrien NP        Dose:  75 mg   Take 1 capsule (75 mg) by mouth daily   Quantity:  30 capsule   Refills:  1            Where to get your medicines      These medications were sent to [facility] - [place], [place] - [address] [address]     Phone:  [phone]     venlafaxine 75 MG 24 hr capsule                Primary Care Provider Office Phone # Fax #    Daja Obrien [phone] [facility] [address]        Equal Access to Services     DEMETRIUS FLROES AH: Kong nuñez Soinga, wacari luqadaha, qaybta kaalmavicky beasley, soraida rob. So [facility] [phone].    ATENCIÓN: Si alton lopes mullins  disposición servicios gratuitos de asistencia lingüística. Parul mcclain [phone].    We comply with applicable federal civil rights laws and [place] laws. We do not discriminate on the basis of race, color, national origin, age, disability, sex, sexual orientation, or gender identity.            Thank you!     Thank you for choosing [facility]  for your care. Our goal is always to provide you with excellent care. Hearing back from our patients is one way we can continue to improve our services. Please take a few minutes to complete the written survey that you may receive in the mail after your visit with us. Thank you!             Your Updated Medication List - Protect others around you: Learn how to safely use, store and throw away your medicines at www.disposemymeds.org.          This list is accurate as of 4/4/18 11:30 AM.  Always use your most recent med list.                   Brand Name Dispense Instructions for use Diagnosis    aspirin 81 MG EC tablet     90 tablet    Take 1 tablet (81 mg) by mouth daily    Type 2 diabetes mellitus without complication, with long-term current use of insulin (H), Benign essential hypertension       BASAGLAR 100 UNIT/ML injection     15 mL    Inject 45 Units Subcutaneous 2 times daily    Type 2 diabetes mellitus without complication, with long-term current use of insulin (H)       blood glucose lancets standard    no brand specified    100 each    Use to test blood sugar once times daily or as directed.    Type 2 diabetes mellitus without complication, without long-term current use of insulin (H)       blood glucose monitoring meter device kit     1 kit    Use to test blood sugars 2 times daily or as directed.    Type 2 diabetes mellitus without complication, without long-term current use of insulin (H)       blood glucose monitoring test strip    no brand specified    100 each    Use to test blood sugars 2 times daily or as directed    Type 2 diabetes mellitus  without complication, without long-term current use of insulin (H)       escitalopram 20 MG tablet    LEXAPRO    30 tablet    Take 1 tablet (20 mg) by mouth daily    Anxiety       exenatide ER 2 MG recon vial kit susp for weekly inj    BYDUREON    4 kit    Inject 2 mg Subcutaneous once a week    Type 2 diabetes mellitus without complication, with long-term current use of insulin (H)       fluconazole 150 MG tablet    DIFLUCAN    4 tablet    Take one tablet once a week    Recurrent candidiasis of vagina       gabapentin 300 MG capsule    NEURONTIN    90 capsule    Take 1 tablet (300 mg) every night for 1-3 days, then 1 tablet twice daily for 1-3 days, then 1 tablet three times daily    Anxiety, Diabetic polyneuropathy associated with type 2 diabetes mellitus (H), Chronic bilateral low back pain with sciatica, sciatica laterality unspecified       hydrOXYzine 25 MG tablet    ATARAX    60 tablet    Take 1-2 tablets (25-50 mg) by mouth every 6 hours as needed for anxiety (or insomnia)    Anxiety       ibuprofen 800 MG tablet    ADVIL/MOTRIN    90 tablet    Take 1 tablet (800 mg) by mouth every 8 hours as needed for moderate pain    Tension headache       insulin pen needle 31G X 6 MM     100 each    Use 1 daily or as directed.    Type 2 diabetes mellitus without complication, without long-term current use of insulin (H)       losartan 100 MG tablet    COZAAR    30 tablet    Take 1 tablet (100 mg) by mouth daily    Benign essential hypertension       metFORMIN 500 MG 24 hr tablet    GLUCOPHAGE-XR    180 tablet    TAKE 1 TABLET BY MOUTH TWICE A DAY WITH MEALS    Type 2 diabetes mellitus without complication, without long-term current use of insulin (H)       metoprolol succinate 50 MG 24 hr tablet    TOPROL-XL    90 tablet    Take 1 tablet (50 mg) by mouth daily    Benign essential hypertension       order for DME     1 each    Blood pressure monitor    Benign essential hypertension       oxybutynin 10 MG 24 hr tablet     DITROPAN-XL    30 tablet    TAKE 1 TABLET BY MOUTH DAILY    Mixed incontinence urge and stress (male)(female)       potassium chloride SA 20 MEQ CR tablet    K-DUR/KLOR-CON M    30 tablet    TAKE 1 TABLET BY MOUTH EVERY DAY    Hypokalemia       simvastatin 10 MG tablet    ZOCOR    90 tablet    TAKE 1 TABLET BY MOUTH NIGHTLY AT BEDTIME    Type 2 diabetes mellitus without complication (H)       venlafaxine 75 MG 24 hr capsule    EFFEXOR-XR    30 capsule    Take 1 capsule (75 mg) by mouth daily    Mild recurrent major depression (H), Anxiety       VITAMIN D (CHOLECALCIFEROL) PO      Take 2,000 Units by mouth daily        vitamin D 00843 UNIT capsule    ERGOCALCIFEROL    36 capsule    Take one capsule by mouth every Monday, Wednesday and Friday    Vitamin D deficiency

## 2018-04-04 NOTE — NURSING NOTE
Chief Complaint   Patient presents with     Depression       Initial BP (!) 146/100 (BP Location: Right arm, Patient Position: Chair, Cuff Size: Adult Large)  Pulse 83  Temp 98  F (36.7  C) (Tympanic)  Resp 18  Ht 5' (1.524 m)  Wt 176 lb 12.8 oz (80.2 kg)  SpO2 97%  BMI 34.53 kg/m2 Estimated body mass index is 34.53 kg/(m^2) as calculated from the following:    Height as of this encounter: 5' (1.524 m).    Weight as of this encounter: 176 lb 12.8 oz (80.2 kg).  Medication Reconciliation: complete   Pamela M Lechevalier LPN

## 2018-04-04 NOTE — LETTER
[facility]  [address]  [facility] [address]  Phone: [phone]    April 4, 2018        Lili Nava  [address]   [facility] [address]          To whom it may concern:    RE: Lili Nava    Patient was seen and treated today at our clinic.  Please excuse from work 4/2/2018-4/8/2018 return on Monday 4/9/2018 without restrictions.     Please contact me for questions or concerns.      Sincerely,        Daja Obrien NP

## 2018-04-04 NOTE — LETTER
[facility]  [address]  [facility] [address]  Phone: [phone]    April 4, 2018        Lili Nava  [address]   [facility] [address]          To whom it may concern:    RE: Lili Nava    Patient was seen and treated today at our clinic.  Please limit work hours to 20 hours per week for the next 2 weeks.      Please contact me for questions or concerns.      Sincerely,        Daja Obrien NP

## 2018-04-05 ASSESSMENT — ANXIETY QUESTIONNAIRES: GAD7 TOTAL SCORE: 15

## 2018-04-05 ASSESSMENT — PATIENT HEALTH QUESTIONNAIRE - PHQ9: SUM OF ALL RESPONSES TO PHQ QUESTIONS 1-9: 18

## 2018-05-02 ENCOUNTER — TELEPHONE (OUTPATIENT)
Dept: FAMILY MEDICINE | Facility: OTHER | Age: 48
End: 2018-05-02

## 2018-05-02 NOTE — TELEPHONE ENCOUNTER
Talked with pt and is doing ok and checked on bs and they have been running around 300.  As of this am 209.  Put in for follow up on 5/18/18   Pamela M Lechevalier LPN    
10.5

## 2018-05-18 ENCOUNTER — OFFICE VISIT (OUTPATIENT)
Dept: FAMILY MEDICINE | Facility: OTHER | Age: 48
End: 2018-05-18
Attending: NURSE PRACTITIONER
Payer: COMMERCIAL

## 2018-05-18 VITALS
DIASTOLIC BLOOD PRESSURE: 98 MMHG | HEART RATE: 106 BPM | HEIGHT: 60 IN | OXYGEN SATURATION: 98 % | BODY MASS INDEX: 34.16 KG/M2 | TEMPERATURE: 97.6 F | SYSTOLIC BLOOD PRESSURE: 138 MMHG | WEIGHT: 174 LBS | RESPIRATION RATE: 14 BRPM

## 2018-05-18 DIAGNOSIS — E66.811 CLASS 1 OBESITY DUE TO EXCESS CALORIES WITH SERIOUS COMORBIDITY AND BODY MASS INDEX (BMI) OF 33.0 TO 33.9 IN ADULT: ICD-10-CM

## 2018-05-18 DIAGNOSIS — F33.0 MILD RECURRENT MAJOR DEPRESSION (H): ICD-10-CM

## 2018-05-18 DIAGNOSIS — Z79.899 ON STATIN THERAPY: ICD-10-CM

## 2018-05-18 DIAGNOSIS — E11.42 TYPE 2 DIABETES MELLITUS WITH DIABETIC POLYNEUROPATHY, WITH LONG-TERM CURRENT USE OF INSULIN (H): Primary | ICD-10-CM

## 2018-05-18 DIAGNOSIS — Z79.4 TYPE 2 DIABETES MELLITUS WITH DIABETIC POLYNEUROPATHY, WITH LONG-TERM CURRENT USE OF INSULIN (H): Primary | ICD-10-CM

## 2018-05-18 DIAGNOSIS — E66.09 CLASS 1 OBESITY DUE TO EXCESS CALORIES WITH SERIOUS COMORBIDITY AND BODY MASS INDEX (BMI) OF 33.0 TO 33.9 IN ADULT: ICD-10-CM

## 2018-05-18 DIAGNOSIS — E78.5 HYPERLIPIDEMIA LDL GOAL <100: ICD-10-CM

## 2018-05-18 DIAGNOSIS — F41.9 ANXIETY: ICD-10-CM

## 2018-05-18 DIAGNOSIS — Z79.4 TYPE 2 DIABETES MELLITUS WITHOUT COMPLICATION, WITH LONG-TERM CURRENT USE OF INSULIN (H): ICD-10-CM

## 2018-05-18 DIAGNOSIS — I10 BENIGN ESSENTIAL HYPERTENSION: ICD-10-CM

## 2018-05-18 DIAGNOSIS — E11.9 TYPE 2 DIABETES MELLITUS WITHOUT COMPLICATION, WITH LONG-TERM CURRENT USE OF INSULIN (H): ICD-10-CM

## 2018-05-18 PROCEDURE — 80061 LIPID PANEL: CPT | Mod: ZL | Performed by: NURSE PRACTITIONER

## 2018-05-18 PROCEDURE — 36415 COLL VENOUS BLD VENIPUNCTURE: CPT | Mod: ZL | Performed by: NURSE PRACTITIONER

## 2018-05-18 PROCEDURE — 40000788 ZZHCL STATISTIC ESTIMATED AVERAGE GLUCOSE: Mod: ZL | Performed by: NURSE PRACTITIONER

## 2018-05-18 PROCEDURE — 80053 COMPREHEN METABOLIC PANEL: CPT | Mod: ZL | Performed by: NURSE PRACTITIONER

## 2018-05-18 PROCEDURE — 83036 HEMOGLOBIN GLYCOSYLATED A1C: CPT | Mod: ZL | Performed by: NURSE PRACTITIONER

## 2018-05-18 PROCEDURE — G0463 HOSPITAL OUTPT CLINIC VISIT: HCPCS

## 2018-05-18 PROCEDURE — 99214 OFFICE O/P EST MOD 30 MIN: CPT | Performed by: NURSE PRACTITIONER

## 2018-05-18 PROCEDURE — 84443 ASSAY THYROID STIM HORMONE: CPT | Mod: ZL | Performed by: NURSE PRACTITIONER

## 2018-05-18 RX ORDER — VENLAFAXINE HYDROCHLORIDE 150 MG/1
150 CAPSULE, EXTENDED RELEASE ORAL DAILY
Qty: 30 CAPSULE | Refills: 5 | Status: SHIPPED | OUTPATIENT
Start: 2018-05-18 | End: 2018-07-09

## 2018-05-18 RX ORDER — INSULIN GLARGINE 100 [IU]/ML
50 INJECTION, SOLUTION SUBCUTANEOUS 2 TIMES DAILY
Qty: 15 ML | Refills: 3 | Status: SHIPPED | OUTPATIENT
Start: 2018-05-18 | End: 2018-08-09

## 2018-05-18 RX ORDER — PIOGLITAZONEHYDROCHLORIDE 15 MG/1
15 TABLET ORAL DAILY
Qty: 30 TABLET | Refills: 1 | Status: SHIPPED | OUTPATIENT
Start: 2018-05-18 | End: 2018-07-10

## 2018-05-18 RX ORDER — METOPROLOL SUCCINATE 100 MG/1
100 TABLET, EXTENDED RELEASE ORAL DAILY
Qty: 90 TABLET | Refills: 3 | Status: SHIPPED | OUTPATIENT
Start: 2018-05-18 | End: 2019-05-25

## 2018-05-18 ASSESSMENT — ANXIETY QUESTIONNAIRES
6. BECOMING EASILY ANNOYED OR IRRITABLE: MORE THAN HALF THE DAYS
7. FEELING AFRAID AS IF SOMETHING AWFUL MIGHT HAPPEN: SEVERAL DAYS
3. WORRYING TOO MUCH ABOUT DIFFERENT THINGS: MORE THAN HALF THE DAYS
5. BEING SO RESTLESS THAT IT IS HARD TO SIT STILL: SEVERAL DAYS
1. FEELING NERVOUS, ANXIOUS, OR ON EDGE: MORE THAN HALF THE DAYS
2. NOT BEING ABLE TO STOP OR CONTROL WORRYING: SEVERAL DAYS
IF YOU CHECKED OFF ANY PROBLEMS ON THIS QUESTIONNAIRE, HOW DIFFICULT HAVE THESE PROBLEMS MADE IT FOR YOU TO DO YOUR WORK, TAKE CARE OF THINGS AT HOME, OR GET ALONG WITH OTHER PEOPLE: SOMEWHAT DIFFICULT
GAD7 TOTAL SCORE: 11

## 2018-05-18 ASSESSMENT — PATIENT HEALTH QUESTIONNAIRE - PHQ9: 5. POOR APPETITE OR OVEREATING: MORE THAN HALF THE DAYS

## 2018-05-18 ASSESSMENT — PAIN SCALES - GENERAL: PAINLEVEL: SEVERE PAIN (7)

## 2018-05-18 NOTE — PATIENT INSTRUCTIONS
ASSESSMENT/PLAN:     1. Type 2 diabetes mellitus with diabetic polyneuropathy, with long-term current use of insulin (H)  Continue bydureon and metformin  - start pioglitazone (ACTOS) 15 MG tablet; Take 1 tablet (15 mg) by mouth daily  Dispense: 30 tablet; Refill: 1  - Hemoglobin A1c  - increase BASAGLAR 100 UNIT/ML injection; Inject 50 Units Subcutaneous 2 times daily  Dispense: 15 mL; Refill: 3    2. Hyperlipidemia LDL goal <100  - Lipid Profile    3. Benign essential hypertension  - TSH with free T4 reflex  - Comprehensive metabolic panel  - continue losartan 100mg daily  - increase metoprolol XL to 100mg daily     4. Anxiety  - increase effexor to 150mg daily  - continue lexapro 20mg daily    5. Mild recurrent major depression (H)  As above    6. On statin therapy  - Comprehensive metabolic panel      FUTURE APPOINTMENTS:       - Follow-up visit in 1 month to review glucose levels or as needed for acute concerns    Daja Obrien NP  [facility]

## 2018-05-18 NOTE — MR AVS SNAPSHOT
After Visit Summary   5/18/2018    Lili Nava    MRN: [id number]           Patient Information     Date Of Birth          1970        Visit Information        Provider Department      5/18/2018 3:30 PM Daja Obrien NP [facility]        Today's Diagnoses     Type 2 diabetes mellitus with diabetic polyneuropathy, with long-term current use of insulin (H)    -  1    Hyperlipidemia LDL goal <100        Benign essential hypertension        Anxiety        Mild recurrent major depression (H)        On statin therapy        Type 2 diabetes mellitus without complication, with long-term current use of insulin (H)          Care Instructions        ASSESSMENT/PLAN:     1. Type 2 diabetes mellitus with diabetic polyneuropathy, with long-term current use of insulin (H)  Continue bydureon and metformin  - start pioglitazone (ACTOS) 15 MG tablet; Take 1 tablet (15 mg) by mouth daily  Dispense: 30 tablet; Refill: 1  - Hemoglobin A1c  - increase BASAGLAR 100 UNIT/ML injection; Inject 50 Units Subcutaneous 2 times daily  Dispense: 15 mL; Refill: 3    2. Hyperlipidemia LDL goal <100  - Lipid Profile    3. Benign essential hypertension  - TSH with free T4 reflex  - Comprehensive metabolic panel  - continue losartan 100mg daily  - increase metoprolol XL to 100mg daily     4. Anxiety  - increase effexor to 150mg daily  - continue lexapro 20mg daily    5. Mild recurrent major depression (H)  As above    6. On statin therapy  - Comprehensive metabolic panel      FUTURE APPOINTMENTS:       - Follow-up visit in 1 month to review glucose levels or as needed for acute concerns    Daja Obrien, NP  [facility]            Follow-ups after your visit        Follow-up notes from your care team     Return in about 4 weeks (around 6/15/2018), or if symptoms worsen or fail to improve.      Your next 10 appointments already scheduled     Jun 19, 2018  8:30 AM CDT   (Arrive by  8:15 AM)   SHORT with Daja Obrien NP   [facility]    [address]  [facility] [address]   [phone]              Who to contact     If you have questions or need follow up information about today's clinic visit or your schedule please contact [facility] directly at [phone].  Normal or non-critical lab and imaging results will be communicated to you by [employer]: [employer] Demand[employer], letter or phone within 4 business days after the clinic has received the results. If you do not hear from us within 7 days, please contact the clinic through Daviat or phone. If you have a critical or abnormal lab result, we will notify you by phone as soon as possible.  Submit refill requests through [employer] or call your pharmacy and they will forward the refill request to us. Please allow 3 business days for your refill to be completed.          Additional Information About Your Visit        [employer] Information     [employer] gives you secure access to your electronic health record. If you see a primary care provider, you can also send messages to your care team and make appointments. If you have questions, please call your primary care clinic.  If you do not have a primary care provider, please call [phone] and they will assist you.        Care EveryWhere ID     This is your Care EveryWhere ID. This could be used by other organizations to access your [facility] medical records  [id number]        Your Vitals Were     Pulse Temperature Respirations Height Pulse Oximetry BMI (Body Mass Index)    106 97.6  F (36.4  C) (Tympanic) 14 5' (1.524 m) 98% 33.98 kg/m2       Blood Pressure from Last 3 Encounters:   05/18/18 (!) 138/98   04/04/18 (!) 146/100   02/12/18 138/85    Weight from Last 3 Encounters:   05/18/18 174 lb (78.9 kg)   04/04/18 176 lb 12.8 oz (80.2 kg)   02/12/18 170 lb (77.1 kg)              We Performed the Following     Comprehensive metabolic  panel     Hemoglobin A1c     Lipid Profile     TSH with free T4 reflex          Today's Medication Changes          These changes are accurate as of 5/18/18  4:14 PM.  If you have any questions, ask your nurse or doctor.               Start taking these medicines.        Dose/Directions    pioglitazone 15 MG tablet   Commonly known as:  ACTOS   Used for:  Type 2 diabetes mellitus with diabetic polyneuropathy, with long-term current use of insulin (H)   Started by:  Daja Obrien NP        Dose:  15 mg   Take 1 tablet (15 mg) by mouth daily   Quantity:  30 tablet   Refills:  1         These medicines have changed or have updated prescriptions.        Dose/Directions    BASAGLAR 100 UNIT/ML injection   This may have changed:  how much to take   Used for:  Type 2 diabetes mellitus without complication, with long-term current use of insulin (H)   Changed by:  Daja Obrien NP        Dose:  50 Units   Inject 50 Units Subcutaneous 2 times daily   Quantity:  15 mL   Refills:  3       metoprolol succinate 100 MG 24 hr tablet   Commonly known as:  TOPROL-XL   This may have changed:    - medication strength  - how much to take   Used for:  Benign essential hypertension   Changed by:  Daja Obrien NP        Dose:  100 mg   Take 1 tablet (100 mg) by mouth daily   Quantity:  90 tablet   Refills:  3       venlafaxine 150 MG 24 hr capsule   Commonly known as:  EFFEXOR-XR   This may have changed:  See the new instructions.   Used for:  Mild recurrent major depression (H), Anxiety   Changed by:  Daja Obrien NP        Dose:  150 mg   Take 1 capsule (150 mg) by mouth daily   Quantity:  30 capsule   Refills:  5            Where to get your medicines      These medications were sent to [facility] - [place], [place] - [address]  [address] [address]     Phone:  [phone]     BASAGLAR 100 UNIT/ML injection    metoprolol succinate 100 MG 24 hr tablet     pioglitazone 15 MG tablet    venlafaxine 150 MG 24 hr capsule                Primary Care Provider Office Phone # Fax #    Daja ObrienBAKARI [phone] [facility] [facility] [address]        Equal Access to Services     SOFYA SANDRA : Hadii angeline ku hadasho Soomaali, waaxda luqadaha, qaybta kaalmada adeegyada, waxay shaniquain haytaneshaolga clinecandidamiguel rob. So [facility] [phone].    ATENCIÓN: Si habla español, tiene a mullins disposición servicios gratuitos de asistencia lingüística. Llame al [phone].    We comply with applicable federal civil rights laws and [place] laws. We do not discriminate on the basis of race, color, national origin, age, disability, sex, sexual orientation, or gender identity.            Thank you!     Thank you for choosing [facility]  for your care. Our goal is always to provide you with excellent care. Hearing back from our patients is one way we can continue to improve our services. Please take a few minutes to complete the written survey that you may receive in the mail after your visit with us. Thank you!             Your Updated Medication List - Protect others around you: Learn how to safely use, store and throw away your medicines at www.disposemymeds.org.          This list is accurate as of 5/18/18  4:14 PM.  Always use your most recent med list.                   Brand Name Dispense Instructions for use Diagnosis    aspirin 81 MG EC tablet     90 tablet    Take 1 tablet (81 mg) by mouth daily    Type 2 diabetes mellitus without complication, with long-term current use of insulin (H), Benign essential hypertension       BASAGLAR 100 UNIT/ML injection     15 mL    Inject 50 Units Subcutaneous 2 times daily    Type 2 diabetes mellitus without complication, with long-term current use of insulin (H)       blood glucose lancets standard    no brand specified    100 each    Use to test blood sugar once times daily or as  directed.    Type 2 diabetes mellitus without complication, without long-term current use of insulin (H)       blood glucose monitoring meter device kit     1 kit    Use to test blood sugars 2 times daily or as directed.    Type 2 diabetes mellitus without complication, without long-term current use of insulin (H)       blood glucose monitoring test strip    no brand specified    100 each    Use to test blood sugars 2 times daily or as directed    Type 2 diabetes mellitus without complication, without long-term current use of insulin (H)       escitalopram 20 MG tablet    LEXAPRO    30 tablet    Take 1 tablet (20 mg) by mouth daily    Anxiety       exenatide ER 2 MG recon vial kit susp for weekly inj    BYDUREON    4 kit    Inject 2 mg Subcutaneous once a week    Type 2 diabetes mellitus without complication, with long-term current use of insulin (H)       fluconazole 150 MG tablet    DIFLUCAN    4 tablet    Take one tablet once a week    Recurrent candidiasis of vagina       gabapentin 300 MG capsule    NEURONTIN    90 capsule    Take 1 tablet (300 mg) every night for 1-3 days, then 1 tablet twice daily for 1-3 days, then 1 tablet three times daily    Anxiety, Diabetic polyneuropathy associated with type 2 diabetes mellitus (H), Chronic bilateral low back pain with sciatica, sciatica laterality unspecified       hydrOXYzine 25 MG tablet    ATARAX    60 tablet    Take 1-2 tablets (25-50 mg) by mouth every 6 hours as needed for anxiety (or insomnia)    Anxiety       ibuprofen 800 MG tablet    ADVIL/MOTRIN    90 tablet    Take 1 tablet (800 mg) by mouth every 8 hours as needed for moderate pain    Tension headache       insulin pen needle 31G X 6 MM     100 each    Use 1 daily or as directed.    Type 2 diabetes mellitus without complication, without long-term current use of insulin (H)       losartan 100 MG tablet    COZAAR    30 tablet    Take 1 tablet (100 mg) by mouth daily    Benign essential hypertension        metFORMIN 500 MG 24 hr tablet    GLUCOPHAGE-XR    180 tablet    TAKE 1 TABLET BY MOUTH TWICE A DAY WITH MEALS    Type 2 diabetes mellitus without complication, without long-term current use of insulin (H)       metoprolol succinate 100 MG 24 hr tablet    TOPROL-XL    90 tablet    Take 1 tablet (100 mg) by mouth daily    Benign essential hypertension       order for DME     1 each    Blood pressure monitor    Benign essential hypertension       oxybutynin 10 MG 24 hr tablet    DITROPAN-XL    30 tablet    TAKE 1 TABLET BY MOUTH DAILY    Mixed incontinence urge and stress (male)(female)       pioglitazone 15 MG tablet    ACTOS    30 tablet    Take 1 tablet (15 mg) by mouth daily    Type 2 diabetes mellitus with diabetic polyneuropathy, with long-term current use of insulin (H)       Potassium Chloride ER 20 MEQ Tbcr     30 tablet    TAKE 1 TABLET BY MOUTH EVERY DAY    Hypokalemia       simvastatin 10 MG tablet    ZOCOR    90 tablet    TAKE 1 TABLET BY MOUTH NIGHTLY AT BEDTIME    Type 2 diabetes mellitus without complication (H)       venlafaxine 150 MG 24 hr capsule    EFFEXOR-XR    30 capsule    Take 1 capsule (150 mg) by mouth daily    Mild recurrent major depression (H), Anxiety       VITAMIN D (CHOLECALCIFEROL) PO      Take 2,000 Units by mouth daily        vitamin D 27096 UNIT capsule    ERGOCALCIFEROL    36 capsule    Take one capsule by mouth every Monday, Wednesday and Friday    Vitamin D deficiency

## 2018-05-18 NOTE — PROGRESS NOTES
SUBJECTIVE:   Lili Nava is a 47 year old female who presents to clinic today for the following health issues:      Diabetes Follow-up    Patient is checking blood sugars: three times daily.   Blood sugar testing frequency justification: Uncontrolled diabetes  Results are as follows:         300's - 400's    Diabetic concerns: None, blood sugar frequently over 200 and other - would like the pen form of Bydureon     Symptoms of hypoglycemia (low blood sugar): none     Paresthesias (numbness or burning in feet) or sores: Yes numbness     Date of last diabetic eye exam: Due     Hyperlipidemia Follow-Up      Rate your low fat/cholesterol diet?: good    Taking statin?  Yes, no muscle aches from statin    Other lipid medications/supplements?:  none    Hypertension Follow-up      Outpatient blood pressures are being checked at home.  Results are high.    Low Salt Diet: no added salt    BP Readings from Last 2 Encounters:   05/18/18 (!) 138/98   04/04/18 (!) 146/100     Hemoglobin A1C (%)   Date Value   11/20/2017 9.7 (H)   08/14/2017 8.5 (H)     LDL Cholesterol Calculated (mg/dL)   Date Value   11/20/2017 93   08/14/2017 97       Amount of exercise or physical activity: walking    Problems taking medications regularly: No    Medication side effects: none    Diet: low salt and low fat/cholesterol        Problem list and histories reviewed & adjusted, as indicated.  Additional history: as documented    Patient Active Problem List   Diagnosis     Type 2 diabetes mellitus without complication, without long-term current use of insulin (H)     Benign essential hypertension     Anxiety     Mild recurrent major depression (H)     Post traumatic stress disorder     Diabetic neuropathy associated with type 2 diabetes mellitus (H)     Primary insomnia     Eczema     Vitamin D deficiency     Mixed incontinence urge and stress (male)(female)     Hyperlipidemia LDL goal <100     Past Surgical History:   Procedure Laterality  Date     COLONOSCOPY  01/01/2013     HYSTERECTOMY         Social History   Substance Use Topics     Smoking status: Former Smoker     Smokeless tobacco: Never Used     Alcohol use Yes      Comment: rare     Family History   Problem Relation Age of Onset     Hypertension Mother      Coronary Artery Disease Father          Current Outpatient Prescriptions   Medication Sig Dispense Refill     aspirin 81 MG EC tablet Take 1 tablet (81 mg) by mouth daily 90 tablet 3     BASAGLAR 100 UNIT/ML injection Inject 45 Units Subcutaneous 2 times daily 15 mL 3     blood glucose (NO BRAND SPECIFIED) lancets standard Use to test blood sugar once times daily or as directed. 100 each 11     blood glucose monitoring (NO BRAND SPECIFIED) test strip Use to test blood sugars 2 times daily or as directed 100 each 11     blood glucose monitoring (ONE TOUCH ULTRA MINI) meter device kit Use to test blood sugars 2 times daily or as directed. 1 kit 0     escitalopram (LEXAPRO) 20 MG tablet Take 1 tablet (20 mg) by mouth daily 30 tablet 5     exenatide ER (BYDUREON) 2 MG recon vial kit susp for weekly inj Inject 2 mg Subcutaneous once a week 4 kit 11     fluconazole (DIFLUCAN) 150 MG tablet Take one tablet once a week 4 tablet 5     gabapentin (NEURONTIN) 300 MG capsule Take 1 tablet (300 mg) every night for 1-3 days, then 1 tablet twice daily for 1-3 days, then 1 tablet three times daily 90 capsule 0     hydrOXYzine (ATARAX) 25 MG tablet Take 1-2 tablets (25-50 mg) by mouth every 6 hours as needed for anxiety (or insomnia) 60 tablet 1     ibuprofen (ADVIL/MOTRIN) 800 MG tablet Take 1 tablet (800 mg) by mouth every 8 hours as needed for moderate pain 90 tablet 0     insulin pen needle 31G X 6 MM Use 1 daily or as directed. 100 each prn     losartan (COZAAR) 100 MG tablet Take 1 tablet (100 mg) by mouth daily 30 tablet 5     metFORMIN (GLUCOPHAGE-XR) 500 MG 24 hr tablet TAKE 1 TABLET BY MOUTH TWICE A DAY WITH MEALS 180 tablet 0     metoprolol  succinate (TOPROL-XL) 50 MG 24 hr tablet Take 1 tablet (50 mg) by mouth daily 90 tablet 1     order for DME Blood pressure monitor 1 each 0     oxybutynin (DITROPAN-XL) 10 MG 24 hr tablet TAKE 1 TABLET BY MOUTH DAILY 30 tablet 11     Potassium Chloride ER 20 MEQ TBCR TAKE 1 TABLET BY MOUTH EVERY DAY 30 tablet 2     simvastatin (ZOCOR) 10 MG tablet TAKE 1 TABLET BY MOUTH NIGHTLY AT BEDTIME 90 tablet 1     venlafaxine (EFFEXOR-XR) 75 MG 24 hr capsule TAKE 1 CAPSULE BY MOUTH EVERY DAY 30 capsule 1     VITAMIN D, CHOLECALCIFEROL, PO Take 2,000 Units by mouth daily       vitamin D (ERGOCALCIFEROL) 36507 UNIT capsule Take one capsule by mouth every Monday, Wednesday and Friday (Patient not taking: Reported on 5/18/2018) 36 capsule 0     Allergies   Allergen Reactions     Celexa [Citalopram] Other (See Comments)     jittery     Lisinopril Cough     Recent Labs   Lab Test  11/20/17   1112  08/14/17   1054   04/20/17   1552   A1C  9.7*  8.5*   --   7.6*   LDL  93  97   --   84   HDL  49*  54   --   51   TRIG  132  78   --   167*   ALT  36  77*   --   41   CR  0.53  0.54   < >  0.65   GFRESTIMATED  >90  >90  Non African American GFR Calc     --   >90  Non  GFR Calc     GFRESTBLACK  >90  >90  African American GFR Calc     --   >90   GFR Calc     POTASSIUM  3.9  4.0   < >  4.1   TSH  2.98  0.37*   --   1.14    < > = values in this interval not displayed.      BP Readings from Last 3 Encounters:   05/18/18 (!) 138/98   04/04/18 (!) 146/100   02/12/18 138/85    Wt Readings from Last 3 Encounters:   05/18/18 174 lb (78.9 kg)   04/04/18 176 lb 12.8 oz (80.2 kg)   02/12/18 170 lb (77.1 kg)                    Reviewed and updated as needed this visit by clinical staff  Tobacco  Allergies  Meds       Reviewed and updated as needed this visit by Provider         ROS:  Constitutional, HEENT, cardiovascular, pulmonary, gi and gu systems are negative, except as otherwise noted.    OBJECTIVE:     BP (!)  138/98 (BP Location: Left arm, Patient Position: Sitting, Cuff Size: Adult Regular)  Pulse 106  Temp 97.6  F (36.4  C) (Tympanic)  Resp 14  Ht 5' (1.524 m)  Wt 174 lb (78.9 kg)  SpO2 98%  BMI 33.98 kg/m2  Body mass index is 33.98 kg/(m^2).  GENERAL: very pleasant female who appears healthy, alert and no distress but overwieght  NECK: no adenopathy, no asymmetry, masses, or scars, thyroid normal to palpation and no carotid bruits  RESP: lungs clear to auscultation - no rales, rhonchi or wheezes  CV: regular rate and rhythm, normal S1 S2, no S3 or S4, no murmur, click or rub, no peripheral edema and peripheral pulses strong  MS: no gross musculoskeletal defects noted, no edema  NEURO: Normal strength and tone, mentation intact and speech normal  PSYCH: mentation appears normal, affect normal/bright        ASSESSMENT/PLAN:     1. Type 2 diabetes mellitus with diabetic polyneuropathy, with long-term current use of insulin (H)  Continue bydureon and metformin  - start pioglitazone (ACTOS) 15 MG tablet; Take 1 tablet (15 mg) by mouth daily  Dispense: 30 tablet; Refill: 1  - Hemoglobin A1c  - increase BASAGLAR 100 UNIT/ML injection; Inject 50 Units Subcutaneous 2 times daily  Dispense: 15 mL; Refill: 3    2. Hyperlipidemia LDL goal <100  - Lipid Profile    3. Benign essential hypertension  - TSH with free T4 reflex  - Comprehensive metabolic panel  - continue losartan 100mg daily  - increase metoprolol XL to 100mg daily     4. Anxiety  - increase effexor to 150mg daily  - continue lexapro 20mg daily    5. Mild recurrent major depression (H)  As above    6. On statin therapy  - Comprehensive metabolic panel      FUTURE APPOINTMENTS:       - Follow-up visit in 1 month to review glucose levels or as needed for acute concerns    Daja Obrien NP  [facility]

## 2018-05-18 NOTE — NURSING NOTE
Chief Complaint   Patient presents with     Diabetes     Last labs 11/17. Patient is not fasting today.     Hypertension     Hyperlipidemia       Initial BP (!) 138/98 (BP Location: Left arm, Patient Position: Sitting, Cuff Size: Adult Regular)  Pulse 106  Temp 97.6  F (36.4  C) (Tympanic)  Resp 14  Ht 5' (1.524 m)  Wt 174 lb (78.9 kg)  SpO2 98%  BMI 33.98 kg/m2 Estimated body mass index is 33.98 kg/(m^2) as calculated from the following:    Height as of this encounter: 5' (1.524 m).    Weight as of this encounter: 174 lb (78.9 kg).  Medication Reconciliation: complete    Polina Townsend LPN

## 2018-05-19 ASSESSMENT — PATIENT HEALTH QUESTIONNAIRE - PHQ9: SUM OF ALL RESPONSES TO PHQ QUESTIONS 1-9: 14

## 2018-05-19 ASSESSMENT — ANXIETY QUESTIONNAIRES: GAD7 TOTAL SCORE: 11

## 2018-05-21 ENCOUNTER — MYC REFILL (OUTPATIENT)
Dept: FAMILY MEDICINE | Facility: OTHER | Age: 48
End: 2018-05-21

## 2018-05-21 ENCOUNTER — TELEPHONE (OUTPATIENT)
Dept: FAMILY MEDICINE | Facility: OTHER | Age: 48
End: 2018-05-21

## 2018-05-21 DIAGNOSIS — G44.209 TENSION HEADACHE: ICD-10-CM

## 2018-05-21 LAB
ALBUMIN SERPL-MCNC: 3.8 G/DL (ref 3.4–5)
ALP SERPL-CCNC: 83 U/L (ref 40–150)
ALT SERPL W P-5'-P-CCNC: 37 U/L (ref 0–50)
ANION GAP SERPL CALCULATED.3IONS-SCNC: 10 MMOL/L (ref 3–14)
AST SERPL W P-5'-P-CCNC: 21 U/L (ref 0–45)
BILIRUB SERPL-MCNC: 0.5 MG/DL (ref 0.2–1.3)
BUN SERPL-MCNC: 11 MG/DL (ref 7–30)
CALCIUM SERPL-MCNC: 9.2 MG/DL (ref 8.5–10.1)
CHLORIDE SERPL-SCNC: 102 MMOL/L (ref 94–109)
CHOLEST SERPL-MCNC: 153 MG/DL
CO2 SERPL-SCNC: 24 MMOL/L (ref 20–32)
CREAT SERPL-MCNC: 0.6 MG/DL (ref 0.52–1.04)
EST. AVERAGE GLUCOSE BLD GHB EST-MCNC: 220 MG/DL
GFR SERPL CREATININE-BSD FRML MDRD: >90 ML/MIN/1.7M2
GLUCOSE SERPL-MCNC: 258 MG/DL (ref 70–99)
HBA1C MFR BLD: 9.3 % (ref 0–5.6)
HDLC SERPL-MCNC: 39 MG/DL
LDLC SERPL CALC-MCNC: 80 MG/DL
NONHDLC SERPL-MCNC: 114 MG/DL
POTASSIUM SERPL-SCNC: 3.9 MMOL/L (ref 3.4–5.3)
PROT SERPL-MCNC: 8.1 G/DL (ref 6.8–8.8)
SODIUM SERPL-SCNC: 136 MMOL/L (ref 133–144)
TRIGL SERPL-MCNC: 170 MG/DL
TSH SERPL DL<=0.005 MIU/L-ACNC: 1.79 MU/L (ref 0.4–4)

## 2018-05-21 RX ORDER — IBUPROFEN 800 MG/1
800 TABLET, FILM COATED ORAL EVERY 8 HOURS PRN
Qty: 90 TABLET | Refills: 3 | Status: SHIPPED | OUTPATIENT
Start: 2018-05-21 | End: 2018-10-11

## 2018-05-21 NOTE — TELEPHONE ENCOUNTER
Message from [place]:  Original authorizing provider: BAKARI Brewster would like a refill of the following medications:  ibuprofen (ADVIL/MOTRIN) 800 MG tablet [Daja Obrien NP]    Preferred pharmacy: [id number] - [address]    Comment:

## 2018-05-21 NOTE — TELEPHONE ENCOUNTER
2:35 PM    Reason for Call: Phone Call    Description: pt called in asking about lab results    Was an appointment offered for this call? No  If yes : Appointment type              Date    Preferred method for responding to this message: Telephone Call  What is your phone number ? [phone]    If we cannot reach you directly, may we leave a detailed response at the number you provided? Yes    Can this message wait until your PCP/provider returns, if available today? Not applicable, provider is in today    Areli Bray

## 2018-06-19 DIAGNOSIS — M54.5 CHRONIC BILATERAL LOW BACK PAIN, WITH SCIATICA PRESENCE UNSPECIFIED: Primary | ICD-10-CM

## 2018-06-19 DIAGNOSIS — G89.29 CHRONIC BILATERAL LOW BACK PAIN, WITH SCIATICA PRESENCE UNSPECIFIED: Primary | ICD-10-CM

## 2018-06-19 RX ORDER — LIDOCAINE 50 MG/G
OINTMENT TOPICAL PRN
Qty: 750 G | Refills: 4 | Status: SHIPPED | OUTPATIENT
Start: 2018-06-19

## 2018-06-19 NOTE — TELEPHONE ENCOUNTER
Pharmacy called to refill lidocaine 5% ointment for her back and feet.  Ok 4 refills [phone] ext [phone]

## 2018-07-09 ENCOUNTER — OFFICE VISIT (OUTPATIENT)
Dept: FAMILY MEDICINE | Facility: OTHER | Age: 48
End: 2018-07-09
Attending: NURSE PRACTITIONER
Payer: COMMERCIAL

## 2018-07-09 VITALS
TEMPERATURE: 97.5 F | DIASTOLIC BLOOD PRESSURE: 98 MMHG | WEIGHT: 177.2 LBS | SYSTOLIC BLOOD PRESSURE: 142 MMHG | OXYGEN SATURATION: 96 % | BODY MASS INDEX: 34.79 KG/M2 | HEIGHT: 60 IN | HEART RATE: 93 BPM

## 2018-07-09 DIAGNOSIS — E78.5 HYPERLIPIDEMIA LDL GOAL <100: ICD-10-CM

## 2018-07-09 DIAGNOSIS — E11.42 DIABETIC POLYNEUROPATHY ASSOCIATED WITH TYPE 2 DIABETES MELLITUS (H): Primary | ICD-10-CM

## 2018-07-09 DIAGNOSIS — Z79.4 TYPE 2 DIABETES MELLITUS WITH DIABETIC POLYNEUROPATHY, WITH LONG-TERM CURRENT USE OF INSULIN (H): ICD-10-CM

## 2018-07-09 DIAGNOSIS — N39.46 MIXED INCONTINENCE URGE AND STRESS (MALE)(FEMALE): ICD-10-CM

## 2018-07-09 DIAGNOSIS — Z79.899 ON STATIN THERAPY: ICD-10-CM

## 2018-07-09 DIAGNOSIS — F33.0 MILD RECURRENT MAJOR DEPRESSION (H): ICD-10-CM

## 2018-07-09 DIAGNOSIS — N95.1 VAGINAL DRYNESS, MENOPAUSAL: ICD-10-CM

## 2018-07-09 DIAGNOSIS — F41.9 ANXIETY: ICD-10-CM

## 2018-07-09 DIAGNOSIS — E11.42 TYPE 2 DIABETES MELLITUS WITH DIABETIC POLYNEUROPATHY, WITH LONG-TERM CURRENT USE OF INSULIN (H): ICD-10-CM

## 2018-07-09 DIAGNOSIS — Z12.31 ENCOUNTER FOR SCREENING MAMMOGRAM FOR BREAST CANCER: ICD-10-CM

## 2018-07-09 DIAGNOSIS — E11.9 TYPE 2 DIABETES MELLITUS WITHOUT COMPLICATION, WITHOUT LONG-TERM CURRENT USE OF INSULIN (H): ICD-10-CM

## 2018-07-09 DIAGNOSIS — I10 BENIGN ESSENTIAL HYPERTENSION: ICD-10-CM

## 2018-07-09 LAB
ALT SERPL W P-5'-P-CCNC: 34 U/L (ref 0–50)
ANION GAP SERPL CALCULATED.3IONS-SCNC: 9 MMOL/L (ref 3–14)
AST SERPL W P-5'-P-CCNC: 20 U/L (ref 0–45)
BUN SERPL-MCNC: 16 MG/DL (ref 7–30)
CALCIUM SERPL-MCNC: 9.2 MG/DL (ref 8.5–10.1)
CHLORIDE SERPL-SCNC: 105 MMOL/L (ref 94–109)
CHOLEST SERPL-MCNC: 156 MG/DL
CO2 SERPL-SCNC: 27 MMOL/L (ref 20–32)
CREAT SERPL-MCNC: 0.74 MG/DL (ref 0.52–1.04)
EST. AVERAGE GLUCOSE BLD GHB EST-MCNC: 186 MG/DL
GFR SERPL CREATININE-BSD FRML MDRD: 84 ML/MIN/1.7M2
GLUCOSE SERPL-MCNC: 187 MG/DL (ref 70–99)
HBA1C MFR BLD: 8.1 % (ref 0–5.6)
HDLC SERPL-MCNC: 54 MG/DL
LDLC SERPL CALC-MCNC: 84 MG/DL
NONHDLC SERPL-MCNC: 102 MG/DL
POTASSIUM SERPL-SCNC: 4.2 MMOL/L (ref 3.4–5.3)
SODIUM SERPL-SCNC: 141 MMOL/L (ref 133–144)
TRIGL SERPL-MCNC: 89 MG/DL
TSH SERPL DL<=0.005 MIU/L-ACNC: 2.06 MU/L (ref 0.4–4)

## 2018-07-09 PROCEDURE — 84450 TRANSFERASE (AST) (SGOT): CPT | Mod: ZL | Performed by: NURSE PRACTITIONER

## 2018-07-09 PROCEDURE — 84460 ALANINE AMINO (ALT) (SGPT): CPT | Mod: ZL | Performed by: NURSE PRACTITIONER

## 2018-07-09 PROCEDURE — 80061 LIPID PANEL: CPT | Mod: ZL | Performed by: NURSE PRACTITIONER

## 2018-07-09 PROCEDURE — 40000788 ZZHCL STATISTIC ESTIMATED AVERAGE GLUCOSE: Mod: ZL | Performed by: NURSE PRACTITIONER

## 2018-07-09 PROCEDURE — 36415 COLL VENOUS BLD VENIPUNCTURE: CPT | Mod: ZL | Performed by: NURSE PRACTITIONER

## 2018-07-09 PROCEDURE — 99214 OFFICE O/P EST MOD 30 MIN: CPT | Performed by: NURSE PRACTITIONER

## 2018-07-09 PROCEDURE — 83036 HEMOGLOBIN GLYCOSYLATED A1C: CPT | Mod: ZL | Performed by: NURSE PRACTITIONER

## 2018-07-09 PROCEDURE — 80048 BASIC METABOLIC PNL TOTAL CA: CPT | Mod: ZL | Performed by: NURSE PRACTITIONER

## 2018-07-09 PROCEDURE — G0463 HOSPITAL OUTPT CLINIC VISIT: HCPCS

## 2018-07-09 PROCEDURE — 84443 ASSAY THYROID STIM HORMONE: CPT | Mod: ZL | Performed by: NURSE PRACTITIONER

## 2018-07-09 RX ORDER — METFORMIN HCL 500 MG
500 TABLET, EXTENDED RELEASE 24 HR ORAL
Qty: 90 TABLET | Refills: 1 | Status: SHIPPED | OUTPATIENT
Start: 2018-07-09 | End: 2018-12-19

## 2018-07-09 RX ORDER — ESTRADIOL 10 UG/1
10 INSERT VAGINAL
Qty: 8 TABLET | Refills: 3 | Status: SHIPPED | OUTPATIENT
Start: 2018-07-09 | End: 2018-10-26

## 2018-07-09 RX ORDER — AMLODIPINE BESYLATE 5 MG/1
5 TABLET ORAL DAILY
Qty: 90 TABLET | Refills: 1 | Status: SHIPPED | OUTPATIENT
Start: 2018-07-09 | End: 2018-12-19

## 2018-07-09 ASSESSMENT — ANXIETY QUESTIONNAIRES
2. NOT BEING ABLE TO STOP OR CONTROL WORRYING: SEVERAL DAYS
3. WORRYING TOO MUCH ABOUT DIFFERENT THINGS: SEVERAL DAYS
5. BEING SO RESTLESS THAT IT IS HARD TO SIT STILL: SEVERAL DAYS
1. FEELING NERVOUS, ANXIOUS, OR ON EDGE: SEVERAL DAYS
4. TROUBLE RELAXING: SEVERAL DAYS
GAD7 TOTAL SCORE: 7
6. BECOMING EASILY ANNOYED OR IRRITABLE: SEVERAL DAYS
7. FEELING AFRAID AS IF SOMETHING AWFUL MIGHT HAPPEN: SEVERAL DAYS

## 2018-07-09 ASSESSMENT — PAIN SCALES - GENERAL: PAINLEVEL: MILD PAIN (3)

## 2018-07-09 NOTE — PROGRESS NOTES
"  SUBJECTIVE:   Lili Nava is a 47 year old female who presents to clinic today for the following health issues:      Diabetes Follow-up    Patient is checking blood sugars: three times daily.   Blood sugar testing frequency justification: Adjustment of medication(s)  Results are as follows:         am - 146         lunchtime - 186         suppertime - 156    Diabetic concerns: None     Symptoms of hypoglycemia (low blood sugar): Dry Mouth,thirsty     Paresthesias (numbness or burning in feet) or sores: Yes Both     Date of last diabetic eye exam: 8/18/2017    She has been having diarrhea that \"comes out of the blue\" a couple times a week.  She is taking metformin extended release and bydureon and actos.      Diabetes Management Resources    Hyperlipidemia Follow-Up      Rate your low fat/cholesterol diet?: fair    Taking statin?  Stopped due to Cramping to both legs    Other lipid medications/supplements?:  none    Hypertension Follow-up      Outpatient blood pressures are not being checked.    Low Salt Diet: no added salt    BP Readings from Last 2 Encounters:   05/18/18 (!) 138/98   04/04/18 (!) 146/100     Hemoglobin A1C (%)   Date Value   05/18/2018 9.3 (H)   11/20/2017 9.7 (H)     LDL Cholesterol Calculated (mg/dL)   Date Value   05/18/2018 80   11/20/2017 93       Amount of exercise or physical activity: Walking everyday    Problems taking medications regularly: No    Medication side effects: Diarrhea once a week    Diet: regular (no restrictions)        Depression and Anxiety Follow-Up    Status since last visit: stable    Other associated symptoms:None    Complicating factors:     Significant life event: No     Current substance abuse: None    She is following with [facility], is weaning off effexor, takes lexparo daily and was prescribed prozac.  She does not like prozac, makes her \"running out of control.\"    PHQ-9 4/4/2018 5/18/2018 7/9/2018   Total Score 18 14 10   Q9: Suicide Ideation " Not at all Not at all Not at all     [place]-7 SCORE 4/4/2018 5/18/2018 7/9/2018   Total Score - - -   Total Score 15 11 7       PHQ-9  English  PHQ-9   Any Language  VIRGINIA-7  Suicide Assessment Five-step Evaluation and Treatment (SAFE-T)    Stress/urge incontinence, stable but notes vaginal dryness and irritation.  Post menopausal, no family history of breast cancer.     Problem list and histories reviewed & adjusted, as indicated.  Additional history: as documented    Patient Active Problem List   Diagnosis     Type 2 diabetes mellitus with diabetic polyneuropathy, with long-term current use of insulin (H)     Benign essential hypertension     Anxiety     Mild recurrent major depression (H)     Post traumatic stress disorder     Diabetic neuropathy associated with type 2 diabetes mellitus (H)     Primary insomnia     Eczema     Vitamin D deficiency     Mixed incontinence urge and stress (male)(female)     Hyperlipidemia LDL goal <100     Past Surgical History:   Procedure Laterality Date     COLONOSCOPY  01/01/2013     HYSTERECTOMY         Social History   Substance Use Topics     Smoking status: Former Smoker     Smokeless tobacco: Never Used     Alcohol use Yes      Comment: rare     Family History   Problem Relation Age of Onset     Hypertension Mother      Coronary Artery Disease Father          Current Outpatient Prescriptions   Medication Sig Dispense Refill     aspirin 81 MG EC tablet Take 1 tablet (81 mg) by mouth daily 90 tablet 3     BASAGLAR 100 UNIT/ML injection Inject 50 Units Subcutaneous 2 times daily 15 mL 3     blood glucose (NO BRAND SPECIFIED) lancets standard Use to test blood sugar once times daily or as directed. 100 each 11     blood glucose monitoring (NO BRAND SPECIFIED) test strip Use to test blood sugars 2 times daily or as directed 100 each 11     blood glucose monitoring (ONE TOUCH ULTRA MINI) meter device kit Use to test blood sugars 2 times daily or as directed. 1 kit 0     escitalopram  (LEXAPRO) 20 MG tablet Take 1 tablet (20 mg) by mouth daily 30 tablet 5     exenatide ER (BYDUREON) 2 MG recon vial kit susp for weekly inj Inject 2 mg Subcutaneous once a week 4 kit 11     fluconazole (DIFLUCAN) 150 MG tablet Take one tablet once a week 4 tablet 5     gabapentin (NEURONTIN) 300 MG capsule Take 1 tablet (300 mg) every night for 1-3 days, then 1 tablet twice daily for 1-3 days, then 1 tablet three times daily 90 capsule 0     hydrOXYzine (ATARAX) 25 MG tablet Take 1-2 tablets (25-50 mg) by mouth every 6 hours as needed for anxiety (or insomnia) 60 tablet 1     ibuprofen (ADVIL/MOTRIN) 800 MG tablet Take 1 tablet (800 mg) by mouth every 8 hours as needed for moderate pain 90 tablet 3     insulin pen needle 31G X 6 MM Use 1 daily or as directed. 100 each prn     lidocaine (XYLOCAINE) 5 % ointment Apply topically as needed for moderate pain 750 g 4     losartan (COZAAR) 100 MG tablet TAKE 1 TABLET BY MOUTH DAILY 30 tablet 5     metFORMIN (GLUCOPHAGE-XR) 500 MG 24 hr tablet TAKE 1 TABLET BY MOUTH TWICE A DAY WITH MEALS 180 tablet 0     metoprolol succinate (TOPROL-XL) 100 MG 24 hr tablet Take 1 tablet (100 mg) by mouth daily 90 tablet 3     order for DME Blood pressure monitor 1 each 0     oxybutynin (DITROPAN-XL) 10 MG 24 hr tablet TAKE 1 TABLET BY MOUTH DAILY 30 tablet 11     pioglitazone (ACTOS) 15 MG tablet Take 1 tablet (15 mg) by mouth daily 30 tablet 1     Potassium Chloride ER 20 MEQ TBCR TAKE 1 TABLET BY MOUTH EVERY DAY 30 tablet 2     vitamin D (ERGOCALCIFEROL) 73240 UNIT capsule Take one capsule by mouth every Monday, Wednesday and Friday (Patient not taking: Reported on 5/18/2018) 36 capsule 0     VITAMIN D, CHOLECALCIFEROL, PO Take 2,000 Units by mouth daily       Allergies   Allergen Reactions     Celexa [Citalopram] Other (See Comments)     jittery     Lisinopril Cough     Recent Labs   Lab Test  05/18/18   1608  11/20/17   1112  08/14/17   1054   A1C  9.3*  9.7*  8.5*   LDL  80  93  97    HDL  39*  49*  54   TRIG  170*  132  78   ALT  37  36  77*   CR  0.60  0.53  0.54   GFRESTIMATED  >90  >90  >90  Non African American GFR Calc     GFRESTBLACK  >90  >90  >90  African American GFR Calc     POTASSIUM  3.9  3.9  4.0   TSH  1.79  2.98  0.37*      BP Readings from Last 3 Encounters:   07/09/18 (!) 142/98   05/18/18 (!) 138/98   04/04/18 (!) 146/100    Wt Readings from Last 3 Encounters:   07/09/18 177 lb 3.2 oz (80.4 kg)   05/18/18 174 lb (78.9 kg)   04/04/18 176 lb 12.8 oz (80.2 kg)                    Reviewed and updated as needed this visit by clinical staff       Reviewed and updated as needed this visit by Provider         ROS:  Constitutional, HEENT, cardiovascular, pulmonary, gi and gu systems are negative, except as otherwise noted.    OBJECTIVE:     BP (!) 142/98  Pulse 93  Temp 97.5  F (36.4  C)  Ht 5' (1.524 m)  Wt 177 lb 3.2 oz (80.4 kg)  SpO2 96%  BMI 34.61 kg/m2  Body mass index is 34.61 kg/(m^2).  GENERAL: healthy, alert and no distress  NECK: no adenopathy, no asymmetry, masses, or scars, thyroid normal to palpation and no carotid bruits  RESP: lungs clear to auscultation - no rales, rhonchi or wheezes  CV: regular rate and rhythm, normal S1 S2, no S3 or S4, no murmur, click or rub, no peripheral edema and peripheral pulses strong  MS: no gross musculoskeletal defects noted, no edema  NEURO: Normal strength and tone, mentation intact and speech normal  PSYCH: mentation appears normal, affect normal/bright      ASSESSMENT/PLAN:     1. Type 2 diabetes mellitus without complication, without long-term current use of insulin (H)  Chronic - continue bydureon and actos  - HEMOGLOBIN A1C  - metFORMIN (GLUCOPHAGE-XR) 500 MG 24 hr tablet; Take 1 tablet (500 mg) by mouth daily (with dinner)  Dispense: 90 tablet; Refill: 1    2. Diabetic polyneuropathy associated with type 2 diabetes mellitus (H)    3. Hyperlipidemia LDL goal <100  - LIPID PANEL    4. Benign essential hypertension  Continue  metoprolol and losartan  - BASIC METABOLIC PANEL  - TSH with free T4 reflex  - amLODIPine (NORVASC) 5 MG tablet; Take 1 tablet (5 mg) by mouth daily  Dispense: 90 tablet; Refill: 1    5. Mild recurrent major depression (H)  Continue current plan    6. Anxiety  As above    7. Vaginal dryness, menopausal  - estradiol (VAGIFEM) 10 MCG TABS vaginal tablet; Place 1 tablet (10 mcg) vaginally twice a week  Dispense: 8 tablet; Refill: 3    8. On statin therapy  - ALT  - AST    9. Encounter for screening mammogram for breast cancer  routine  - MA Screening Digital Bilateral ([facility]); Future    10. Mixed incontinence urge and stress (male)(female)  Continue current plan        FUTURE APPOINTMENTS:       - Follow-up visit in 3 months for chronic conditions and soon for comprehensive exam with pap.      Daja Obrien, NP  [facility]

## 2018-07-09 NOTE — PATIENT INSTRUCTIONS
ASSESSMENT/PLAN:     1. Type 2 diabetes mellitus without complication, without long-term current use of insulin (H)  chronic  - HEMOGLOBIN A1C  - metFORMIN (GLUCOPHAGE-XR) 500 MG 24 hr tablet; Take 1 tablet (500 mg) by mouth daily (with dinner)  Dispense: 90 tablet; Refill: 1    2. Diabetic polyneuropathy associated with type 2 diabetes mellitus (H)    3. Hyperlipidemia LDL goal <100  - LIPID PANEL    4. Benign essential hypertension  Continue metoprolol and losartan  - BASIC METABOLIC PANEL  - TSH with free T4 reflex  - amLODIPine (NORVASC) 5 MG tablet; Take 1 tablet (5 mg) by mouth daily  Dispense: 90 tablet; Refill: 1    5. Mild recurrent major depression (H)  Continue current plan    6. Anxiety  As above    7. Vaginal dryness, menopausal  - estradiol (VAGIFEM) 10 MCG TABS vaginal tablet; Place 1 tablet (10 mcg) vaginally twice a week  Dispense: 8 tablet; Refill: 3    8. On statin therapy  - ALT  - AST    9. Encounter for screening mammogram for breast cancer  routine  - MA Screening Digital Bilateral ([facility]); Future        FUTURE APPOINTMENTS:       - Follow-up visit in 3 months for chronic conditions and soon for comprehensive exam.      Daja Obrien, NP  [facility]

## 2018-07-09 NOTE — NURSING NOTE
Chief Complaint   Patient presents with     Hypertension     Diabetes     Lipids       Initial BP (!) 142/98  Pulse 93  Temp 97.5  F (36.4  C)  Ht 5' (1.524 m)  Wt 177 lb 3.2 oz (80.4 kg)  SpO2 96%  BMI 34.61 kg/m2 Estimated body mass index is 34.61 kg/(m^2) as calculated from the following:    Height as of this encounter: 5' (1.524 m).    Weight as of this encounter: 177 lb 3.2 oz (80.4 kg).  Medication Reconciliation: tano Hansen MA

## 2018-07-09 NOTE — MR AVS SNAPSHOT
After Visit Summary   7/9/2018    Lili Nava    MRN: [id number]           Patient Information     Date Of Birth          1970        Visit Information        Provider Department      7/9/2018 8:45 AM Daja Obrien NP [facility]        Today's Diagnoses     Diabetic polyneuropathy associated with type 2 diabetes mellitus (H)    -  1    Type 2 diabetes mellitus without complication, without long-term current use of insulin (H)        Hyperlipidemia LDL goal <100        Benign essential hypertension        Mild recurrent major depression (H)        Anxiety        Vaginal dryness, menopausal        On statin therapy        Encounter for screening mammogram for breast cancer          Care Instructions        ASSESSMENT/PLAN:     1. Type 2 diabetes mellitus without complication, without long-term current use of insulin (H)  chronic  - HEMOGLOBIN A1C  - metFORMIN (GLUCOPHAGE-XR) 500 MG 24 hr tablet; Take 1 tablet (500 mg) by mouth daily (with dinner)  Dispense: 90 tablet; Refill: 1    2. Diabetic polyneuropathy associated with type 2 diabetes mellitus (H)    3. Hyperlipidemia LDL goal <100  - LIPID PANEL    4. Benign essential hypertension  Continue metoprolol and losartan  - BASIC METABOLIC PANEL  - TSH with free T4 reflex  - amLODIPine (NORVASC) 5 MG tablet; Take 1 tablet (5 mg) by mouth daily  Dispense: 90 tablet; Refill: 1    5. Mild recurrent major depression (H)  Continue current plan    6. Anxiety  As above    7. Vaginal dryness, menopausal  - estradiol (VAGIFEM) 10 MCG TABS vaginal tablet; Place 1 tablet (10 mcg) vaginally twice a week  Dispense: 8 tablet; Refill: 3    8. On statin therapy  - ALT  - AST    9. Encounter for screening mammogram for breast cancer  routine  - MA Screening Digital Bilateral ([facility]); Future        FUTURE APPOINTMENTS:       - Follow-up visit in 3 months for chronic conditions and soon for comprehensive exam.      Daja CHAMPAGNE  BAKARI Obrien  [facility]          Follow-ups after your visit        Future tests that were ordered for you today     Open Future Orders        Priority Expected Expires Ordered    MA Screening Digital Bilateral ([facility]) Routine  7/9/2019 7/9/2018            Who to contact     If you have questions or need follow up information about today's clinic visit or your schedule please contact [facility] directly at [phone].  Normal or non-critical lab and imaging results will be communicated to you by Personallyhart, letter or phone within 4 business days after the clinic has received the results. If you do not hear from us within 7 days, please contact the clinic through Neighborhoods or phone. If you have a critical or abnormal lab result, we will notify you by phone as soon as possible.  Submit refill requests through Neighborhoods or call your pharmacy and they will forward the refill request to us. Please allow 3 business days for your refill to be completed.          Additional Information About Your Visit        PersonallyharKARALIT Information     Neighborhoods gives you secure access to your electronic health record. If you see a primary care provider, you can also send messages to your care team and make appointments. If you have questions, please call your primary care clinic.  If you do not have a primary care provider, please call [phone] and they will assist you.        Care EveryWhere ID     This is your Care EveryWhere ID. This could be used by other organizations to access your [facility] medical records  [id number]        Your Vitals Were     Pulse Temperature Height Pulse Oximetry BMI (Body Mass Index)       93 97.5  F (36.4  C) 5' (1.524 m) 96% 34.61 kg/m2        Blood Pressure from Last 3 Encounters:   07/09/18 (!) 142/98   05/18/18 (!) 138/98   04/04/18 (!) 146/100    Weight from Last 3 Encounters:   07/09/18 177 lb 3.2 oz (80.4 kg)   05/18/18 174 lb (78.9 kg)   04/04/18 176 lb 12.8 oz  (80.2 kg)              We Performed the Following     ALT     AST     BASIC METABOLIC PANEL     HEMOGLOBIN A1C     LIPID PANEL     TSH with free T4 reflex          Today's Medication Changes          These changes are accurate as of 7/9/18  9:32 AM.  If you have any questions, ask your nurse or doctor.               Start taking these medicines.        Dose/Directions    amLODIPine 5 MG tablet   Commonly known as:  NORVASC   Used for:  Benign essential hypertension   Started by:  Daja Obrien NP        Dose:  5 mg   Take 1 tablet (5 mg) by mouth daily   Quantity:  90 tablet   Refills:  1       estradiol 10 MCG Tabs vaginal tablet   Commonly known as:  VAGIFEM   Used for:  Vaginal dryness, menopausal   Started by:  Daja Obrien NP        Dose:  10 mcg   Place 1 tablet (10 mcg) vaginally twice a week   Quantity:  8 tablet   Refills:  3         These medicines have changed or have updated prescriptions.        Dose/Directions    metFORMIN 500 MG 24 hr tablet   Commonly known as:  GLUCOPHAGE-XR   This may have changed:  See the new instructions.   Used for:  Type 2 diabetes mellitus without complication, without long-term current use of insulin (H)   Changed by:  Daja Obrien NP        Dose:  500 mg   Take 1 tablet (500 mg) by mouth daily (with dinner)   Quantity:  90 tablet   Refills:  1            Where to get your medicines      These medications were sent to [id number] - [address] [address]     Phone:  [phone]     amLODIPine 5 MG tablet    estradiol 10 MCG Tabs vaginal tablet    metFORMIN 500 MG 24 hr tablet                Primary Care Provider Office Phone # Fax #    Daja Obrien [phone]       [address] [place] [address]        Equal Access to Services     DEMETRIUS FLORES AH: vance Velazquez, soraida terrazas  barry montgomerytiara saenz'aan ah. So [facility] [phone].    ATENCIÓN: Si habla adan, tiene a mullins disposición servicios gratuitos de asistencia lingüística. Parul al [phone].    We comply with applicable federal civil rights laws and [place] laws. We do not discriminate on the basis of race, color, national origin, age, disability, sex, sexual orientation, or gender identity.            Thank you!     Thank you for choosing [facility]  for your care. Our goal is always to provide you with excellent care. Hearing back from our patients is one way we can continue to improve our services. Please take a few minutes to complete the written survey that you may receive in the mail after your visit with us. Thank you!             Your Updated Medication List - Protect others around you: Learn how to safely use, store and throw away your medicines at www.disposemymeds.org.          This list is accurate as of 7/9/18  9:32 AM.  Always use your most recent med list.                   Brand Name Dispense Instructions for use Diagnosis    amLODIPine 5 MG tablet    NORVASC    90 tablet    Take 1 tablet (5 mg) by mouth daily    Benign essential hypertension       aspirin 81 MG EC tablet     90 tablet    Take 1 tablet (81 mg) by mouth daily    Type 2 diabetes mellitus without complication, with long-term current use of insulin (H), Benign essential hypertension       BASAGLAR 100 UNIT/ML injection     15 mL    Inject 50 Units Subcutaneous 2 times daily    Type 2 diabetes mellitus without complication, with long-term current use of insulin (H)       blood glucose lancets standard    no brand specified    100 each    Use to test blood sugar once times daily or as directed.    Type 2 diabetes mellitus without complication, without long-term current use of insulin (H)       blood glucose monitoring meter device kit     1 kit    Use to test blood sugars 2 times daily or as directed.    Type 2 diabetes mellitus  without complication, without long-term current use of insulin (H)       blood glucose monitoring test strip    no brand specified    100 each    Use to test blood sugars 2 times daily or as directed    Type 2 diabetes mellitus without complication, without long-term current use of insulin (H)       escitalopram 20 MG tablet    LEXAPRO    30 tablet    Take 1 tablet (20 mg) by mouth daily    Anxiety       estradiol 10 MCG Tabs vaginal tablet    VAGIFEM    8 tablet    Place 1 tablet (10 mcg) vaginally twice a week    Vaginal dryness, menopausal       exenatide ER 2 MG recon vial kit susp for weekly inj    BYDUREON    4 kit    Inject 2 mg Subcutaneous once a week    Type 2 diabetes mellitus without complication, with long-term current use of insulin (H)       fluconazole 150 MG tablet    DIFLUCAN    4 tablet    Take one tablet once a week    Recurrent candidiasis of vagina       gabapentin 300 MG capsule    NEURONTIN    90 capsule    Take 1 tablet (300 mg) every night for 1-3 days, then 1 tablet twice daily for 1-3 days, then 1 tablet three times daily    Anxiety, Diabetic polyneuropathy associated with type 2 diabetes mellitus (H), Chronic bilateral low back pain with sciatica, sciatica laterality unspecified       hydrOXYzine 25 MG tablet    ATARAX    60 tablet    Take 1-2 tablets (25-50 mg) by mouth every 6 hours as needed for anxiety (or insomnia)    Anxiety       ibuprofen 800 MG tablet    ADVIL/MOTRIN    90 tablet    Take 1 tablet (800 mg) by mouth every 8 hours as needed for moderate pain    Tension headache       insulin pen needle 31G X 6 MM     100 each    Use 1 daily or as directed.    Type 2 diabetes mellitus without complication, without long-term current use of insulin (H)       lidocaine 5 % ointment    XYLOCAINE    750 g    Apply topically as needed for moderate pain    Chronic bilateral low back pain, with sciatica presence unspecified       losartan 100 MG tablet    COZAAR    30 tablet    TAKE 1  TABLET BY MOUTH DAILY    Benign essential hypertension       metFORMIN 500 MG 24 hr tablet    GLUCOPHAGE-XR    90 tablet    Take 1 tablet (500 mg) by mouth daily (with dinner)    Type 2 diabetes mellitus without complication, without long-term current use of insulin (H)       metoprolol succinate 100 MG 24 hr tablet    TOPROL-XL    90 tablet    Take 1 tablet (100 mg) by mouth daily    Benign essential hypertension       order for DME     1 each    Blood pressure monitor    Benign essential hypertension       oxybutynin 10 MG 24 hr tablet    DITROPAN-XL    30 tablet    TAKE 1 TABLET BY MOUTH DAILY    Mixed incontinence urge and stress (male)(female)       pioglitazone 15 MG tablet    ACTOS    30 tablet    Take 1 tablet (15 mg) by mouth daily    Type 2 diabetes mellitus with diabetic polyneuropathy, with long-term current use of insulin (H)       Potassium Chloride ER 20 MEQ Tbcr     30 tablet    TAKE 1 TABLET BY MOUTH EVERY DAY    Hypokalemia       VITAMIN D (CHOLECALCIFEROL) PO      Take 2,000 Units by mouth daily        vitamin D 54533 UNIT capsule    ERGOCALCIFEROL    36 capsule    Take one capsule by mouth every Monday, Wednesday and Friday    Vitamin D deficiency

## 2018-07-10 RX ORDER — PIOGLITAZONEHYDROCHLORIDE 30 MG/1
30 TABLET ORAL DAILY
Qty: 90 TABLET | Refills: 1 | Status: SHIPPED | OUTPATIENT
Start: 2018-07-10 | End: 2018-12-19

## 2018-07-10 ASSESSMENT — PATIENT HEALTH QUESTIONNAIRE - PHQ9: SUM OF ALL RESPONSES TO PHQ QUESTIONS 1-9: 10

## 2018-07-10 ASSESSMENT — ANXIETY QUESTIONNAIRES: GAD7 TOTAL SCORE: 7

## 2018-08-03 NOTE — PATIENT INSTRUCTIONS
ASSESSMENT/PLAN:   1. Routine general medical examination at a health care facility  Exam completed  Mammogram is scheduled    2. Type 2 diabetes mellitus without complication, with long-term current use of insulin (H)  chronic  - BASAGLAR 100 UNIT/ML injection; Inject 50 Units Subcutaneous 2 times daily  Dispense: 15 mL; Refill: 3    3. Vitamin D deficiency  Continue daily Vit D3 2000IU daily  - Vitamin D level today  - will refill high dose Vit D is level remains low    4. Tobacco abuse  Cessation encouraged  - Tobacco Cessation - Order to Satisfy Health Maintenance    5. Tobacco abuse counseling  As above    6. Infected sebaceous cyst of skin  Symptomatic  - warm compress  - cephALEXin (KEFLEX) 500 MG capsule; Take 1 capsule (500 mg) by mouth 3 times daily for 10 days  Dispense: 30 capsule; Refill: 0  - follow-up if no improvement    COUNSELING:  Reviewed preventive health counseling, as reflected in patient instructions       Regular exercise       Healthy diet/nutrition       Vision screening       Hearing screening       Immunizations          Preventive Health Recommendations  Female Ages 40 to 49    Yearly exam:     See your health care provider every year in order to  1. Review health changes.   2. Discuss preventive care.    3. Review your medicines if your doctor prescribed any.      Get a Pap test every three years (unless you have an abnormal result and your provider advises testing more often).      If you get Pap tests with HPV test, you only need to test every 5 years, unless you have an abnormal result. You do not need a Pap test if your uterus was removed (hysterectomy) and you have not had cancer.      You should be tested each year for STDs (sexually transmitted diseases), if you're at risk.     Ask your doctor if you should have a mammogram.      Have a colonoscopy (test for colon cancer) if someone in your family has had colon cancer or polyps before age 50.       Have a cholesterol test  every 5 years.       Have a diabetes test (fasting glucose) after age 45. If you are at risk for diabetes, you should have this test every 3 years.    Shots: Get a flu shot each year. Get a tetanus shot every 10 years.     Nutrition:     Eat at least 5 servings of fruits and vegetables each day.    Eat whole-grain bread, whole-wheat pasta and brown rice instead of white grains and rice.    Get adequate Calcium and Vitamin D.      Lifestyle    Exercise at least 150 minutes a week (an average of 30 minutes a day, 5 days a week). This will help you control your weight and prevent disease.    Limit alcohol to one drink per day.    No smoking.     Wear sunscreen to prevent skin cancer.    See your dentist every six months for an exam and cleaning.    HOW TO QUIT SMOKING  Smoking is one of the hardest habits to break. About half of all those who have ever smoked have been able to quit, and most of those (about 70%) who still smoke want to quit. Here are some of the best ways to stop smoking.     KEEP TRYING:  It takes most smokers about 8 tries before they are finally able to fully quit. So, the more often you try and fail, the better your chance of quitting the next time! So, don't give up!    GO COLD TURKEY:  Most ex-smokers quit cold turkey. Trying to cut back gradually doesn't seem to work as well, perhaps because it continues the smoking habit. Also, it is possible to fool yourself by inhaling more while smoking fewer cigarettes. This results in the same amount of nicotine in your body!    GET SUPPORT:  Support programs can make an important difference, especially for the heavy smoker. These groups offer lectures, methods to change your behavior and peer support. Call the free national Quitline for more information. 800-QUIT-NOW (785-415-1348). Low-cost or free programs are offered by many hospitals, local chapters of the American Lung Association (344-471-7087) and the American Cancer Society (642-877-9662). Support  at home is important too. Non-smokers can help by offering praise and encouragement. If the smoker fails to quit, encourage them to try again!    OVER-THE-COUNTER MEDICINES:  For those who can't quit on their own, Nicotine Replacement Therapy (NRT) may make quitting much easier. Certain aids such as the nicotine patch, gum and lozenge are available without a prescription. However, it is best to use these under the guidance of your doctor. The skin patch provides a steady supply of nicotine to the body. Nicotine gum and lozenge gives temporary bursts of low levels of nicotine. Both methods take the edge off the craving for cigarettes. WARNING: If you feel symptoms of nicotine overdose, such as nausea, vomiting, dizziness, weakness, or fast heartbeat, stop using these and see your doctor.    PRESCRIPTION MEDICINES:  After evaluating your smoking patterns and prior attempts at quitting, your doctor may offer a prescription medicine such as bupropion (Zyban, Wellbutrin), varenicline (Chantix, Champix), a niocotine inhaler or nasal spray. Each has its unique advantage and side effects which your doctor can review with you.    HEALTH BENEFITS OF QUITTING:  The benefits of quitting start right away and keep improving the longer you go without smokin minutes: blood pressure and pulse return to normal  8 hours: oxygen levels return to normal  2 days: ability to smell and taste begins to improve as damaged nerves start to regrow  2-3 weeks: circulation and lung function improves  1-9 months: decreased cough, congestion and shortness of breath; less tired  1 year: risk of heart attack decreases by half  5 years: risk of lung cancer decreases by half; risk of stroke becomes the same as a non-smoker  For information about how to quit smoking, visit the following links:  National Cancer [place] ,   Clearing the Air, Quit Smoking Today   - an online booklet. http://www.smokefree.gov/pubs/clearing_the_air.pdf  Smokefree.gov  http://smokefree.gov/  QuitNet http://www.quitnet.com/    6803-3019 Emma Woodard, [address], [place] [address]. All rights reserved. This information is not intended as a substitute for professional medical care. Always follow your healthcare professional's instructions.    The Benefits of Living Smoke Free  What do you want to gain from quitting? Check off some reasons to quit.  Health Benefits  ___ Reduce my risk of lung cancer, heart disease, chronic lung disease  ___ Have fewer wrinkles and softer skin  ___ Improve my sense of taste and smell  ___ For pregnant women--reduce the risk of having a miscarriage, stillbirth, premature birth, or low-birth-weight baby  Personal Benefits  ___ Feel more in control of my life  ___ Have better-smelling hair, breath, clothes, home, and car  ___ Save time by not having to take smoke breaks, buy cigarettes, or hunt for a light  ___ Have whiter teeth  Family Benefits  ___ Reduce my children s respiratory tract infections  ___ Set a good example for my children  ___ Reduce my family s cancer risk  Financial Benefits  ___ Save hundreds of dollars each year that would be spent on cigarettes  ___ Save money on medical bills  ___ Save on life, health, and car insurance premiums    Those Dollars Add Up!  Cigarettes are expensive, and getting more expensive all the time. Do you realize how much money you are spending on cigarettes per year? What is the average amount you spend on a pack of cigarettes? What is the average number of packs that you smoke per day? Using your answers to these questions, fill in this formula to help you find out:  ($ _____ per pack) ×  ( _____ number of packs per day) × (365 days) =  $ _____ yearly cost of smoking  Besides tobacco, there are other costs, including extra cleaning bills and replacement costs for clothing and furniture; medical expenses for smoking-related illnesses; and higher health, life, and car insurance  premiums.    Cigars and Pipes Count Too!  Cigars and pipes are also dangerous. So are smokeless (chewing) tobacco and snuff. All of these products contain nicotine, a highly addictive substance that has harmful effects on your body. Quitting smoking means giving up all tobacco products.      2211-0014 Emma Woodard, [address] [place] [address], [place] [address]. All rights reserved. This information is not intended as a substitute for professional medical care. Always follow your healthcare professional's instructions.

## 2018-08-03 NOTE — PROGRESS NOTES
"   SUBJECTIVE:   CC: Lili Nava is an 47 year old woman who presents for preventive health visit.     Healthy Habits:    Do you get at least three servings of calcium containing foods daily (dairy, green leafy vegetables, etc.)? {YES/NO, DAIRY INTAKE:174627::\"yes\"}    Amount of exercise or daily activities, outside of work: {AMOUNT EXERCISE:795302}    Problems taking medications regularly {Yes /No default:908060::\"No\"}    Medication side effects: {Yes /No default.:286424::\"No\"}    Have you had an eye exam in the past two years? {YESNOBLANK:046082}    Do you see a dentist twice per year? {YESNOBLANK:336025}    Do you have sleep apnea, excessive snoring or daytime drowsiness?{YESNOBLANK:307945}  {Outside tests to abstract? :398201}    {additional problems to add (Optional):904683}    Today's PHQ-2 Score:   PHQ-2 ( 1999 [employer]) 7/23/2018 7/22/2018   Q1: Little interest or pleasure in doing things 1 1   Q2: Feeling down, depressed or hopeless 1 1   PHQ-2 Score 2 2   Q1: Little interest or pleasure in doing things - Several days   Q2: Feeling down, depressed or hopeless - Several days   PHQ-2 Score - 2     {PHQ-2 LOOK IN ASSESSMENTS (Optional) :134760}  Abuse: Current or Past(Physical, Sexual or Emotional)- {YES/NO/NA:128779}  Do you feel safe in your environment - {YES/NO/NA:431825}    Social History   Substance Use Topics     Smoking status: Former Smoker     Smokeless tobacco: Never Used     Alcohol use Yes      Comment: rare     If you drink alcohol do you typically have >3 drinks per day or >7 drinks per week? {ETOH :882751}                     Reviewed orders with patient.  Reviewed health maintenance and updated orders accordingly - {Yes/No:602927::\"Yes\"}  {Chronicprobdata (Optional):419963}    {Mammo Decision Support (Optional):432491}    Pertinent mammograms are reviewed under the imaging tab.  History of abnormal Pap smear: {PAP HX:504485}     Reviewed and updated as needed this visit by clinical " "staff         Reviewed and updated as needed this visit by Provider        {HISTORY OPTIONS (Optional):608251}    ROS:  { :868513}    OBJECTIVE:   There were no vitals taken for this visit.  EXAM:  {Exam Choices:929339}    {Diagnostic Test Results (Optional):349852::\"Diagnostic Test Results:\",\"none \"}    ASSESSMENT/PLAN:   {Diag Picklist:972419}    COUNSELING:   {FEMALE COUNSELING MESSAGES:106390::\"Reviewed preventive health counseling, as reflected in patient instructions\"}    BP Readings from Last 1 Encounters:   07/09/18 (!) 142/98     Estimated body mass index is 34.61 kg/(m^2) as calculated from the following:    Height as of 7/9/18: 5' (1.524 m).    Weight as of 7/9/18: 177 lb 3.2 oz (80.4 kg).    {BP Counseling- Complete if BP >= 120/80  (Optional):340011}  {Weight Management Plan (ACO) Complete if BMI is abnormal-  Ages 18-64  BMI >24.9.  Age 65+ with BMI <23 or >30 (Optional):371000}     reports that she has quit smoking. She has never used smokeless tobacco.  {Tobacco Cessation -- Complete if patient is a smoker (Optional):149777}    Counseling Resources:  ATP IV Guidelines  Pooled Cohorts Equation Calculator  Breast Cancer Risk Calculator  FRAX Risk Assessment  ICSI Preventive Guidelines  Dietary Guidelines for Americans, 2010  USDA's MyPlate  ASA Prophylaxis  Lung CA Screening    Daja Obrien, NP  [facility]  "

## 2018-08-09 ENCOUNTER — OFFICE VISIT (OUTPATIENT)
Dept: FAMILY MEDICINE | Facility: OTHER | Age: 48
End: 2018-08-09
Attending: NURSE PRACTITIONER
Payer: COMMERCIAL

## 2018-08-09 VITALS
DIASTOLIC BLOOD PRESSURE: 86 MMHG | WEIGHT: 177 LBS | TEMPERATURE: 98.1 F | SYSTOLIC BLOOD PRESSURE: 136 MMHG | OXYGEN SATURATION: 98 % | BODY MASS INDEX: 34.75 KG/M2 | HEIGHT: 60 IN | HEART RATE: 111 BPM

## 2018-08-09 DIAGNOSIS — E11.9 TYPE 2 DIABETES MELLITUS WITHOUT COMPLICATION, WITH LONG-TERM CURRENT USE OF INSULIN (H): ICD-10-CM

## 2018-08-09 DIAGNOSIS — Z79.4 TYPE 2 DIABETES MELLITUS WITHOUT COMPLICATION, WITH LONG-TERM CURRENT USE OF INSULIN (H): ICD-10-CM

## 2018-08-09 DIAGNOSIS — Z72.0 TOBACCO ABUSE: ICD-10-CM

## 2018-08-09 DIAGNOSIS — L72.3 INFECTED SEBACEOUS CYST OF SKIN: ICD-10-CM

## 2018-08-09 DIAGNOSIS — Z00.00 ROUTINE GENERAL MEDICAL EXAMINATION AT A HEALTH CARE FACILITY: Primary | ICD-10-CM

## 2018-08-09 DIAGNOSIS — E55.9 VITAMIN D DEFICIENCY: ICD-10-CM

## 2018-08-09 DIAGNOSIS — Z71.6 TOBACCO ABUSE COUNSELING: ICD-10-CM

## 2018-08-09 DIAGNOSIS — L08.9 INFECTED SEBACEOUS CYST OF SKIN: ICD-10-CM

## 2018-08-09 PROCEDURE — 99396 PREV VISIT EST AGE 40-64: CPT | Performed by: NURSE PRACTITIONER

## 2018-08-09 RX ORDER — INSULIN GLARGINE 100 [IU]/ML
50 INJECTION, SOLUTION SUBCUTANEOUS 2 TIMES DAILY
Qty: 15 ML | Refills: 3 | Status: SHIPPED | OUTPATIENT
Start: 2018-08-09 | End: 2019-04-06

## 2018-08-09 RX ORDER — CEPHALEXIN 500 MG/1
500 CAPSULE ORAL 3 TIMES DAILY
Qty: 30 CAPSULE | Refills: 0 | Status: SHIPPED | OUTPATIENT
Start: 2018-08-09 | End: 2018-08-19

## 2018-08-09 RX ORDER — ERGOCALCIFEROL 1.25 MG/1
CAPSULE, LIQUID FILLED ORAL
Qty: 36 CAPSULE | Refills: 0 | Status: CANCELLED | OUTPATIENT
Start: 2018-08-09

## 2018-08-09 ASSESSMENT — ANXIETY QUESTIONNAIRES
2. NOT BEING ABLE TO STOP OR CONTROL WORRYING: SEVERAL DAYS
4. TROUBLE RELAXING: MORE THAN HALF THE DAYS
7. FEELING AFRAID AS IF SOMETHING AWFUL MIGHT HAPPEN: SEVERAL DAYS
GAD7 TOTAL SCORE: 8
6. BECOMING EASILY ANNOYED OR IRRITABLE: SEVERAL DAYS
1. FEELING NERVOUS, ANXIOUS, OR ON EDGE: SEVERAL DAYS
3. WORRYING TOO MUCH ABOUT DIFFERENT THINGS: SEVERAL DAYS
IF YOU CHECKED OFF ANY PROBLEMS ON THIS QUESTIONNAIRE, HOW DIFFICULT HAVE THESE PROBLEMS MADE IT FOR YOU TO DO YOUR WORK, TAKE CARE OF THINGS AT HOME, OR GET ALONG WITH OTHER PEOPLE: SOMEWHAT DIFFICULT
5. BEING SO RESTLESS THAT IT IS HARD TO SIT STILL: SEVERAL DAYS

## 2018-08-09 ASSESSMENT — PAIN SCALES - GENERAL: PAINLEVEL: SEVERE PAIN (7)

## 2018-08-09 NOTE — PROGRESS NOTES
SUBJECTIVE:   CC: Lili Nava is an 47 year old woman who presents for preventive health visit.     Physical   Annual:     Getting at least 3 servings of Calcium per day:  Yes    Bi-annual eye exam:  Yes    Dental care twice a year:  NO    Sleep apnea or symptoms of sleep apnea:  Daytime drowsiness    Diet:  Diabetic    Frequency of exercise:  2-3 days/week    Duration of exercise:  15-30 minutes    Taking medications regularly:  Yes    Medication side effects:  Other    Additional concerns today:  No        Dermatology       Duration: 8/3/18    Description (location/character/radiation): patient states having sore on back that is very painful and is growing in size    Intensity:  7/10    Accompanying signs and symptoms: redness, whiteness on site, growing, very tender, feels fatigued, headache and no energy.      History (similar episodes/previous evaluation): None    Precipitating or alleviating factors: None    Therapies tried and outcome: warm compresses        Today's PHQ-2 Score:   PHQ-2 ( 1999 [employer]) 7/23/2018   Q1: Little interest or pleasure in doing things 1   Q2: Feeling down, depressed or hopeless 1   PHQ-2 Score 2   Q1: Little interest or pleasure in doing things -   Q2: Feeling down, depressed or hopeless -   PHQ-2 Score -       Due for repeat vit D level - has not been taking any supplement but did complete high dose.  She is also requesting a refill of insulin.  States glucose levels are coming down.      Abuse: Current or Past(Physical, Sexual or Emotional)- No  Do you feel safe in your environment - Yes    Social History   Substance Use Topics     Smoking status: Light Tobacco Smoker     Types: Cigarettes     Smokeless tobacco: Never Used     Alcohol use Yes      Comment: rare     Alcohol Use 7/23/2018   If you drink alcohol do you typically have greater than 3 drinks per day OR greater than 7 drinks per week? No       Reviewed orders with patient.  Reviewed health maintenance and updated  orders accordingly - Yes  BP Readings from Last 3 Encounters:   08/09/18 136/86   07/09/18 (!) 142/98   05/18/18 (!) 138/98    Wt Readings from Last 3 Encounters:   08/09/18 177 lb (80.3 kg)   07/09/18 177 lb 3.2 oz (80.4 kg)   05/18/18 174 lb (78.9 kg)                  Patient Active Problem List   Diagnosis     Type 2 diabetes mellitus with diabetic polyneuropathy, with long-term current use of insulin (H)     Benign essential hypertension     Anxiety     Mild recurrent major depression (H)     Post traumatic stress disorder     Diabetic neuropathy associated with type 2 diabetes mellitus (H)     Primary insomnia     Eczema     Vitamin D deficiency     Mixed incontinence urge and stress (male)(female)     Hyperlipidemia LDL goal <100     Past Surgical History:   Procedure Laterality Date     COLONOSCOPY  01/01/2013     HYSTERECTOMY         Social History   Substance Use Topics     Smoking status: Light Tobacco Smoker     Types: Cigarettes     Smokeless tobacco: Never Used     Alcohol use Yes      Comment: rare     Family History   Problem Relation Age of Onset     Hypertension Mother      Depression Mother      Coronary Artery Disease Father          Current Outpatient Prescriptions   Medication Sig Dispense Refill     amLODIPine (NORVASC) 5 MG tablet Take 1 tablet (5 mg) by mouth daily 90 tablet 1     aspirin 81 MG EC tablet Take 1 tablet (81 mg) by mouth daily 90 tablet 3     BASAGLAR 100 UNIT/ML injection Inject 50 Units Subcutaneous 2 times daily 15 mL 3     blood glucose (NO BRAND SPECIFIED) lancets standard Use to test blood sugar once times daily or as directed. 100 each 11     blood glucose monitoring (NO BRAND SPECIFIED) test strip Use to test blood sugars 2 times daily or as directed 100 each 11     blood glucose monitoring (ONE TOUCH ULTRA MINI) meter device kit Use to test blood sugars 2 times daily or as directed. 1 kit 0     cephALEXin (KEFLEX) 500 MG capsule Take 1 capsule (500 mg) by mouth 3 times  daily for 10 days 30 capsule 0     escitalopram (LEXAPRO) 20 MG tablet Take 1 tablet (20 mg) by mouth daily 30 tablet 5     estradiol (VAGIFEM) 10 MCG TABS vaginal tablet Place 1 tablet (10 mcg) vaginally twice a week 8 tablet 3     exenatide ER (BYDUREON) 2 MG recon vial kit susp for weekly inj Inject 2 mg Subcutaneous once a week 4 kit 11     fluconazole (DIFLUCAN) 150 MG tablet Take one tablet once a week 4 tablet 5     gabapentin (NEURONTIN) 300 MG capsule Take 1 tablet (300 mg) every night for 1-3 days, then 1 tablet twice daily for 1-3 days, then 1 tablet three times daily 90 capsule 0     hydrOXYzine (ATARAX) 25 MG tablet Take 1-2 tablets (25-50 mg) by mouth every 6 hours as needed for anxiety (or insomnia) 60 tablet 1     ibuprofen (ADVIL/MOTRIN) 800 MG tablet Take 1 tablet (800 mg) by mouth every 8 hours as needed for moderate pain 90 tablet 3     insulin pen needle 31G X 6 MM Use 1 daily or as directed. 100 each prn     lidocaine (XYLOCAINE) 5 % ointment Apply topically as needed for moderate pain 750 g 4     losartan (COZAAR) 100 MG tablet TAKE 1 TABLET BY MOUTH DAILY 30 tablet 5     metFORMIN (GLUCOPHAGE-XR) 500 MG 24 hr tablet Take 1 tablet (500 mg) by mouth daily (with dinner) 90 tablet 1     metoprolol succinate (TOPROL-XL) 100 MG 24 hr tablet Take 1 tablet (100 mg) by mouth daily 90 tablet 3     order for DME Blood pressure monitor 1 each 0     oxybutynin (DITROPAN-XL) 10 MG 24 hr tablet TAKE 1 TABLET BY MOUTH DAILY 30 tablet 11     pioglitazone (ACTOS) 30 MG tablet Take 1 tablet (30 mg) by mouth daily 90 tablet 1     Potassium Chloride ER 20 MEQ TBCR TAKE 1 TABLET BY MOUTH EVERY DAY 30 tablet 2     vitamin D (ERGOCALCIFEROL) 26499 UNIT capsule Take one capsule by mouth every Monday, Wednesday and Friday 36 capsule 0     [DISCONTINUED] BASAGLAR 100 UNIT/ML injection Inject 50 Units Subcutaneous 2 times daily 15 mL 3     Allergies   Allergen Reactions     Celexa [Citalopram] Other (See Comments)      elkin     Lisinopril Cough     Recent Labs   Lab Test  07/09/18   0938  05/18/18   1608  11/20/17   1112   A1C  8.1*  9.3*  9.7*   LDL  84  80  93   HDL  54  39*  49*   TRIG  89  170*  132   ALT  34  37  36   CR  0.74  0.60  0.53   GFRESTIMATED  84  >90  >90   GFRESTBLACK  >90  >90  >90   POTASSIUM  4.2  3.9  3.9   TSH  2.06  1.79  2.98        Mammogram is scheduled for July 29, 2018.    Pertinent mammograms are reviewed under the imaging tab.  History of abnormal Pap smear:  Pap is not indicated, has had a complete hysterectomy.       Reviewed and updated as needed this visit by clinical staff  Tobacco  Allergies  Meds         Reviewed and updated as needed this visit by Provider  Allergies         Review of Systems  CONSTITUTIONAL: NEGATIVE for fever, chills, change in weight  INTEGUMENTARY/SKIN: as above  EYES: NEGATIVE for vision changes or irritation  ENT: NEGATIVE for ear, mouth and throat problems  RESP: NEGATIVE for significant cough or SOB  BREAST: NEGATIVE for masses, tenderness or discharge  CV: NEGATIVE for chest pain, palpitations or peripheral edema  GI: NEGATIVE for nausea, abdominal pain, heartburn, or change in bowel habits  : NEGATIVE for unusual urinary or vaginal symptoms. No vaginal bleeding.  MUSCULOSKELETAL: NEGATIVE for significant arthralgias or myalgia  NEURO: NEGATIVE for weakness, dizziness or paresthesias  ENDOCRINE: NEGATIVE for temperature intolerance, skin/hair changes  PSYCHIATRIC: NEGATIVE for changes in mood or affect      OBJECTIVE:   /86 (BP Location: Left arm, Patient Position: Chair, Cuff Size: Adult Large)  Pulse 111  Temp 98.1  F (36.7  C) (Tympanic)  Ht 5' (1.524 m)  Wt 177 lb (80.3 kg)  SpO2 98%  BMI 34.57 kg/m2  Physical Exam  GENERAL: healthy, alert and no distress  EYES: Eyes grossly normal to inspection, PERRL and conjunctivae and sclerae normal  HENT: ear canals and TM's normal, nose and mouth without ulcers or lesions  NECK: no adenopathy, no  asymmetry, masses, or scars and thyroid normal to palpation  RESP: lungs clear to auscultation - no rales, rhonchi or wheezes  BREAST: normal without masses, tenderness or nipple discharge and no palpable axillary masses or adenopathy  CV: regular rate and rhythm, normal S1 S2, no S3 or S4, no murmur, click or rub, no peripheral edema and peripheral pulses strong  ABDOMEN: soft, nontender, no hepatosplenomegaly, no masses and bowel sounds normal  MS: no gross musculoskeletal defects noted, no edema  SKIN: upper mid back, one large firm cystic nodule that is tender with palpation, erythematous; no drainage noted.  Skin is warm to palpation.    NEURO: Normal strength and tone, mentation intact and speech normal  PSYCH: mentation appears normal, affect normal/bright      ASSESSMENT/PLAN:   1. Routine general medical examination at a health care facility  Exam completed  Mammogram is scheduled    2. Type 2 diabetes mellitus without complication, with long-term current use of insulin (H)  chronic  - BASAGLAR 100 UNIT/ML injection; Inject 50 Units Subcutaneous 2 times daily  Dispense: 15 mL; Refill: 3    3. Vitamin D deficiency  Continue daily Vit D3 2000IU daily  - Vitamin D level today  - will refill high dose Vit D is level remains low    4. Tobacco abuse  Cessation encouraged  - Tobacco Cessation - Order to Satisfy Health Maintenance    5. Tobacco abuse counseling  As above    6. Infected sebaceous cyst of skin  Symptomatic  - warm compress  - cephALEXin (KEFLEX) 500 MG capsule; Take 1 capsule (500 mg) by mouth 3 times daily for 10 days  Dispense: 30 capsule; Refill: 0  - follow-up if no improvement    COUNSELING:  Reviewed preventive health counseling, as reflected in patient instructions       Regular exercise       Healthy diet/nutrition       Vision screening       Hearing screening       Immunizations          BP Readings from Last 1 Encounters:   08/09/18 136/86     Estimated body mass index is 34.57 kg/(m^2) as  calculated from the following:    Height as of this encounter: 5' (1.524 m).    Weight as of this encounter: 177 lb (80.3 kg).      Weight management plan: Discussed healthy diet and exercise guidelines and patient will follow up in 3 months in clinic to re-evaluate.     reports that she has been smoking Cigarettes.  She has never used smokeless tobacco.  Tobacco Cessation Action Plan: Information offered: Patient not interested at this time    Counseling Resources:  ATP IV Guidelines  Pooled Cohorts Equation Calculator  Breast Cancer Risk Calculator  FRAX Risk Assessment  ICSI Preventive Guidelines  Dietary Guidelines for Americans, 2010  USDA's MyPlate  ASA Prophylaxis  Lung CA Screening    Daja Obrien, NP  [facility]

## 2018-08-09 NOTE — NURSING NOTE
Chief Complaint   Patient presents with     Physical     Gyn Exam     Derm Problem       Initial /86 (BP Location: Left arm, Patient Position: Chair, Cuff Size: Adult Large)  Pulse 111  Temp 98.1  F (36.7  C) (Tympanic)  Ht 5' (1.524 m)  Wt 177 lb (80.3 kg)  SpO2 98%  BMI 34.57 kg/m2 Estimated body mass index is 34.57 kg/(m^2) as calculated from the following:    Height as of this encounter: 5' (1.524 m).    Weight as of this encounter: 177 lb (80.3 kg).  Medication Reconciliation: complete    Annel Velazquez MA

## 2018-08-09 NOTE — MR AVS SNAPSHOT
After Visit Summary   8/9/2018    Lili Nava    MRN: [id number]           Patient Information     Date Of Birth          1970        Visit Information        Provider Department      8/9/2018 9:00 AM Daja Obrien NP [facility]        Today's Diagnoses     Routine general medical examination at a health care facility    -  1    Type 2 diabetes mellitus without complication, with long-term current use of insulin (H)        Vitamin D deficiency        Tobacco abuse        Tobacco abuse counseling        Infected sebaceous cyst of skin          Care Instructions      ASSESSMENT/PLAN:   1. Routine general medical examination at a health care facility  Exam completed  Mammogram is scheduled    2. Type 2 diabetes mellitus without complication, with long-term current use of insulin (H)  chronic  - BASAGLAR 100 UNIT/ML injection; Inject 50 Units Subcutaneous 2 times daily  Dispense: 15 mL; Refill: 3    3. Vitamin D deficiency  Continue daily Vit D3 2000IU daily  - Vitamin D level today  - will refill high dose Vit D is level remains low    4. Tobacco abuse  Cessation encouraged  - Tobacco Cessation - Order to Satisfy Health Maintenance    5. Tobacco abuse counseling  As above    6. Infected sebaceous cyst of skin  Symptomatic  - warm compress  - cephALEXin (KEFLEX) 500 MG capsule; Take 1 capsule (500 mg) by mouth 3 times daily for 10 days  Dispense: 30 capsule; Refill: 0  - follow-up if no improvement    COUNSELING:  Reviewed preventive health counseling, as reflected in patient instructions       Regular exercise       Healthy diet/nutrition       Vision screening       Hearing screening       Immunizations          Preventive Health Recommendations  Female Ages 40 to 49    Yearly exam:     See your health care provider every year in order to  1. Review health changes.   2. Discuss preventive care.    3. Review your medicines if your doctor prescribed any.      Get a Pap  test every three years (unless you have an abnormal result and your provider advises testing more often).      If you get Pap tests with HPV test, you only need to test every 5 years, unless you have an abnormal result. You do not need a Pap test if your uterus was removed (hysterectomy) and you have not had cancer.      You should be tested each year for STDs (sexually transmitted diseases), if you're at risk.     Ask your doctor if you should have a mammogram.      Have a colonoscopy (test for colon cancer) if someone in your family has had colon cancer or polyps before age 50.       Have a cholesterol test every 5 years.       Have a diabetes test (fasting glucose) after age 45. If you are at risk for diabetes, you should have this test every 3 years.    Shots: Get a flu shot each year. Get a tetanus shot every 10 years.     Nutrition:     Eat at least 5 servings of fruits and vegetables each day.    Eat whole-grain bread, whole-wheat pasta and brown rice instead of white grains and rice.    Get adequate Calcium and Vitamin D.      Lifestyle    Exercise at least 150 minutes a week (an average of 30 minutes a day, 5 days a week). This will help you control your weight and prevent disease.    Limit alcohol to one drink per day.    No smoking.     Wear sunscreen to prevent skin cancer.    See your dentist every six months for an exam and cleaning.    HOW TO QUIT SMOKING  Smoking is one of the hardest habits to break. About half of all those who have ever smoked have been able to quit, and most of those (about 70%) who still smoke want to quit. Here are some of the best ways to stop smoking.     KEEP TRYING:  It takes most smokers about 8 tries before they are finally able to fully quit. So, the more often you try and fail, the better your chance of quitting the next time! So, don't give up!    GO COLD TURKEY:  Most ex-smokers quit cold turkey. Trying to cut back gradually doesn't seem to work as well, perhaps  because it continues the smoking habit. Also, it is possible to fool yourself by inhaling more while smoking fewer cigarettes. This results in the same amount of nicotine in your body!    GET SUPPORT:  Support programs can make an important difference, especially for the heavy smoker. These groups offer lectures, methods to change your behavior and peer support. Call the free national Quitline for more information. 800-QUIT-NOW (863-618-4781). Low-cost or free programs are offered by many hospitals, local chapters of the American Lung Association (392-122-8068) and the American Cancer Society (382-492-1340). Support at home is important too. Non-smokers can help by offering praise and encouragement. If the smoker fails to quit, encourage them to try again!    OVER-THE-COUNTER MEDICINES:  For those who can't quit on their own, Nicotine Replacement Therapy (NRT) may make quitting much easier. Certain aids such as the nicotine patch, gum and lozenge are available without a prescription. However, it is best to use these under the guidance of your doctor. The skin patch provides a steady supply of nicotine to the body. Nicotine gum and lozenge gives temporary bursts of low levels of nicotine. Both methods take the edge off the craving for cigarettes. WARNING: If you feel symptoms of nicotine overdose, such as nausea, vomiting, dizziness, weakness, or fast heartbeat, stop using these and see your doctor.    PRESCRIPTION MEDICINES:  After evaluating your smoking patterns and prior attempts at quitting, your doctor may offer a prescription medicine such as bupropion (Zyban, Wellbutrin), varenicline (Chantix, Champix), a niocotine inhaler or nasal spray. Each has its unique advantage and side effects which your doctor can review with you.    HEALTH BENEFITS OF QUITTING:  The benefits of quitting start right away and keep improving the longer you go without smokin minutes: blood pressure and pulse return to normal  8  hours: oxygen levels return to normal  2 days: ability to smell and taste begins to improve as damaged nerves start to regrow  2-3 weeks: circulation and lung function improves  1-9 months: decreased cough, congestion and shortness of breath; less tired  1 year: risk of heart attack decreases by half  5 years: risk of lung cancer decreases by half; risk of stroke becomes the same as a non-smoker  For information about how to quit smoking, visit the following links:  National Cancer [place] ,   Clearing the Air, Quit Smoking Today   - an online booklet. http://www.smokefree.gov/pubs/clearing_the_air.pdf  Smokefree.gov http://smokefree.gov/  QuitNet http://www.quitnet.com/    7489-3343 Emma Woodard, [address] [place] [address], [place] [address]. All rights reserved. This information is not intended as a substitute for professional medical care. Always follow your healthcare professional's instructions.    The Benefits of Living Smoke Free  What do you want to gain from quitting? Check off some reasons to quit.  Health Benefits  ___ Reduce my risk of lung cancer, heart disease, chronic lung disease  ___ Have fewer wrinkles and softer skin  ___ Improve my sense of taste and smell  ___ For pregnant women--reduce the risk of having a miscarriage, stillbirth, premature birth, or low-birth-weight baby  Personal Benefits  ___ Feel more in control of my life  ___ Have better-smelling hair, breath, clothes, home, and car  ___ Save time by not having to take smoke breaks, buy cigarettes, or hunt for a light  ___ Have whiter teeth  Family Benefits  ___ Reduce my children s respiratory tract infections  ___ Set a good example for my children  ___ Reduce my family s cancer risk  Financial Benefits  ___ Save hundreds of dollars each year that would be spent on cigarettes  ___ Save money on medical bills  ___ Save on life, health, and car insurance premiums    Those Dollars Add Up!  Cigarettes are expensive, and getting more  expensive all the time. Do you realize how much money you are spending on cigarettes per year? What is the average amount you spend on a pack of cigarettes? What is the average number of packs that you smoke per day? Using your answers to these questions, fill in this formula to help you find out:  ($ _____ per pack) ×  ( _____ number of packs per day) × (365 days) =  $ _____ yearly cost of smoking  Besides tobacco, there are other costs, including extra cleaning bills and replacement costs for clothing and furniture; medical expenses for smoking-related illnesses; and higher health, life, and car insurance premiums.    Cigars and Pipes Count Too!  Cigars and pipes are also dangerous. So are smokeless (chewing) tobacco and snuff. All of these products contain nicotine, a highly addictive substance that has harmful effects on your body. Quitting smoking means giving up all tobacco products.      4832-2677 [place] [address]. All rights reserved. This information is not intended as a substitute for professional medical care. Always follow your healthcare professional's instructions.          Follow-ups after your visit        Your next 10 appointments already scheduled     Aug 29, 2018  1:00 PM CDT   (Arrive by 12:45 PM)   MA SCREENING DIGITAL BILATERAL with MTMA1   [facility] ([facility] )    [address] [facility] [address]   [phone]           Do not use any powder, lotion or deodorant under your arms or on your breast. If you do, we will ask you to remove it before your exam.  Wear comfortable, two-piece clothing.  If you have any allergies, tell your care team.  Bring any previous mammograms from other facilities or have them mailed to the breast center.            Oct 10, 2018  8:45 AM CDT   (Arrive by 8:30 AM)   SHORT with Daja Obrien NP   [facility] ([facility]  [facility]    [address]  [place] [address]   [phone]              Who to contact     If you have questions or need follow up information about today's clinic visit or your schedule please contact [facility] directly at [phone].  Normal or non-critical lab and imaging results will be communicated to you by MyChart, letter or phone within 4 business days after the clinic has received the results. If you do not hear from us within 7 days, please contact the clinic through [employer] or phone. If you have a critical or abnormal lab result, we will notify you by phone as soon as possible.  Submit refill requests through [employer] or call your pharmacy and they will forward the refill request to us. Please allow 3 business days for your refill to be completed.          Additional Information About Your Visit        Doceb[employer]art Information     [employer] gives you secure access to your electronic health record. If you see a primary care provider, you can also send messages to your care team and make appointments. If you have questions, please call your primary care clinic.  If you do not have a primary care provider, please call [phone] and they will assist you.        Care EveryWhere ID     This is your Care EveryWhere ID. This could be used by other organizations to access your [facility] medical records  [id number]        Your Vitals Were     Pulse Temperature Height Pulse Oximetry BMI (Body Mass Index)       111 98.1  F (36.7  C) (Tympanic) 5' (1.524 m) 98% 34.57 kg/m2        Blood Pressure from Last 3 Encounters:   08/09/18 136/86   07/09/18 (!) 142/98   05/18/18 (!) 138/98    Weight from Last 3 Encounters:   08/09/18 177 lb (80.3 kg)   07/09/18 177 lb 3.2 oz (80.4 kg)   05/18/18 174 lb (78.9 kg)              We Performed the Following     Tobacco Cessation - Order to Satisfy Health Maintenance     Vitamin D Deficiency          Today's Medication Changes          These changes are  accurate as of 8/9/18  9:55 AM.  If you have any questions, ask your nurse or doctor.               Start taking these medicines.        Dose/Directions    cephALEXin 500 MG capsule   Commonly known as:  KEFLEX   Used for:  Infected sebaceous cyst of skin   Started by:  Daja Obrien NP        Dose:  500 mg   Take 1 capsule (500 mg) by mouth 3 times daily for 10 days   Quantity:  30 capsule   Refills:  0            Where to get your medicines      These medications were sent to [facility] [address] [address]     Phone:  [phone]     BASAGLAR 100 UNIT/ML injection    cephALEXin 500 MG capsule                Primary Care Provider Office Phone # Fax #    Daja Obrien [phone] [address] [facility] [address]        Equal Access to Services     [facility]: Hadii angeline paz hadasho Soomaali, waaxda luqadaha, qaybta kaalmada adeegyada, waxay shaniquain haytaneshan young duffy . So [facility] [phone].    ATENCIÓN: Si habla español, tiene a mullins disposición servicios gratuitos de asistencia lingüística. Llame al [phone].    We comply with applicable federal civil rights laws and [place] laws. We do not discriminate on the basis of race, color, national origin, age, disability, sex, sexual orientation, or gender identity.            Thank you!     Thank you for choosing [facility]  for your care. Our goal is always to provide you with excellent care. Hearing back from our patients is one way we can continue to improve our services. Please take a few minutes to complete the written survey that you may receive in the mail after your visit with us. Thank you!             Your Updated Medication List - Protect others around you: Learn how to safely use, store and throw away your medicines at www.disposemymeds.org.          This list is accurate as of 8/9/18  9:55 AM.   Always use your most recent med list.                   Brand Name Dispense Instructions for use Diagnosis    amLODIPine 5 MG tablet    NORVASC    90 tablet    Take 1 tablet (5 mg) by mouth daily    Benign essential hypertension       aspirin 81 MG EC tablet     90 tablet    Take 1 tablet (81 mg) by mouth daily    Type 2 diabetes mellitus without complication, with long-term current use of insulin (H), Benign essential hypertension       BASAGLAR 100 UNIT/ML injection     15 mL    Inject 50 Units Subcutaneous 2 times daily    Type 2 diabetes mellitus without complication, with long-term current use of insulin (H)       blood glucose lancets standard    no brand specified    100 each    Use to test blood sugar once times daily or as directed.    Type 2 diabetes mellitus without complication, without long-term current use of insulin (H)       blood glucose monitoring meter device kit     1 kit    Use to test blood sugars 2 times daily or as directed.    Type 2 diabetes mellitus without complication, without long-term current use of insulin (H)       blood glucose monitoring test strip    no brand specified    100 each    Use to test blood sugars 2 times daily or as directed    Type 2 diabetes mellitus without complication, without long-term current use of insulin (H)       cephALEXin 500 MG capsule    KEFLEX    30 capsule    Take 1 capsule (500 mg) by mouth 3 times daily for 10 days    Infected sebaceous cyst of skin       escitalopram 20 MG tablet    LEXAPRO    30 tablet    Take 1 tablet (20 mg) by mouth daily    Anxiety       estradiol 10 MCG Tabs vaginal tablet    VAGIFEM    8 tablet    Place 1 tablet (10 mcg) vaginally twice a week    Vaginal dryness, menopausal       exenatide ER 2 MG recon vial kit susp for weekly inj    BYDUREON    4 kit    Inject 2 mg Subcutaneous once a week    Type 2 diabetes mellitus without complication, with long-term current use of insulin (H)       fluconazole 150 MG tablet    DIFLUCAN     4 tablet    Take one tablet once a week    Recurrent candidiasis of vagina       gabapentin 300 MG capsule    NEURONTIN    90 capsule    Take 1 tablet (300 mg) every night for 1-3 days, then 1 tablet twice daily for 1-3 days, then 1 tablet three times daily    Anxiety, Diabetic polyneuropathy associated with type 2 diabetes mellitus (H), Chronic bilateral low back pain with sciatica, sciatica laterality unspecified       hydrOXYzine 25 MG tablet    ATARAX    60 tablet    Take 1-2 tablets (25-50 mg) by mouth every 6 hours as needed for anxiety (or insomnia)    Anxiety       ibuprofen 800 MG tablet    ADVIL/MOTRIN    90 tablet    Take 1 tablet (800 mg) by mouth every 8 hours as needed for moderate pain    Tension headache       insulin pen needle 31G X 6 MM     100 each    Use 1 daily or as directed.    Type 2 diabetes mellitus without complication, without long-term current use of insulin (H)       lidocaine 5 % ointment    XYLOCAINE    750 g    Apply topically as needed for moderate pain    Chronic bilateral low back pain, with sciatica presence unspecified       losartan 100 MG tablet    COZAAR    30 tablet    TAKE 1 TABLET BY MOUTH DAILY    Benign essential hypertension       metFORMIN 500 MG 24 hr tablet    GLUCOPHAGE-XR    90 tablet    Take 1 tablet (500 mg) by mouth daily (with dinner)    Type 2 diabetes mellitus without complication, without long-term current use of insulin (H)       metoprolol succinate 100 MG 24 hr tablet    TOPROL-XL    90 tablet    Take 1 tablet (100 mg) by mouth daily    Benign essential hypertension       order for DME     1 each    Blood pressure monitor    Benign essential hypertension       oxybutynin 10 MG 24 hr tablet    DITROPAN-XL    30 tablet    TAKE 1 TABLET BY MOUTH DAILY    Mixed incontinence urge and stress (male)(female)       pioglitazone 30 MG tablet    ACTOS    90 tablet    Take 1 tablet (30 mg) by mouth daily    Type 2 diabetes mellitus with diabetic polyneuropathy,  with long-term current use of insulin (H)       Potassium Chloride ER 20 MEQ Tbcr     30 tablet    TAKE 1 TABLET BY MOUTH EVERY DAY    Hypokalemia       vitamin D 35225 UNIT capsule    ERGOCALCIFEROL    36 capsule    Take one capsule by mouth every Monday, Wednesday and Friday    Vitamin D deficiency

## 2018-08-09 NOTE — LETTER
[facility]  [address]  [facility] [address]  Phone: [phone]    August 9, 2018        Lili Nava  [address]   [facility] [address]          To whom it may concern:    RE: Lili Nava    Patient was seen and treated today at our clinic.  Please excuse from work 8/9/2018.      Please contact me for questions or concerns.      Sincerely,        Daja Obrien NP

## 2018-08-10 ASSESSMENT — ANXIETY QUESTIONNAIRES: GAD7 TOTAL SCORE: 8

## 2018-08-10 ASSESSMENT — PATIENT HEALTH QUESTIONNAIRE - PHQ9: SUM OF ALL RESPONSES TO PHQ QUESTIONS 1-9: 9

## 2018-08-20 ENCOUNTER — TELEPHONE (OUTPATIENT)
Dept: FAMILY MEDICINE | Facility: OTHER | Age: 48
End: 2018-08-20

## 2018-08-20 ENCOUNTER — OFFICE VISIT (OUTPATIENT)
Dept: SURGERY | Facility: OTHER | Age: 48
End: 2018-08-20
Attending: NURSE PRACTITIONER
Payer: COMMERCIAL

## 2018-08-20 VITALS
HEIGHT: 60 IN | SYSTOLIC BLOOD PRESSURE: 124 MMHG | WEIGHT: 177 LBS | BODY MASS INDEX: 34.75 KG/M2 | HEART RATE: 96 BPM | RESPIRATION RATE: 15 BRPM | DIASTOLIC BLOOD PRESSURE: 82 MMHG | TEMPERATURE: 97.2 F | OXYGEN SATURATION: 98 %

## 2018-08-20 DIAGNOSIS — L08.9 INFECTED CYST OF SKIN: Primary | ICD-10-CM

## 2018-08-20 DIAGNOSIS — L08.9 INFECTED CYST OF SKIN: ICD-10-CM

## 2018-08-20 DIAGNOSIS — L72.9 INFECTED CYST OF SKIN: Primary | ICD-10-CM

## 2018-08-20 DIAGNOSIS — L72.9 INFECTED CYST OF SKIN: ICD-10-CM

## 2018-08-20 DIAGNOSIS — E55.9 VITAMIN D DEFICIENCY: ICD-10-CM

## 2018-08-20 PROCEDURE — G0463 HOSPITAL OUTPT CLINIC VISIT: HCPCS

## 2018-08-20 PROCEDURE — 36415 COLL VENOUS BLD VENIPUNCTURE: CPT | Mod: ZL | Performed by: NURSE PRACTITIONER

## 2018-08-20 PROCEDURE — 87070 CULTURE OTHR SPECIMN AEROBIC: CPT | Mod: ZL | Performed by: SURGERY

## 2018-08-20 PROCEDURE — 82306 VITAMIN D 25 HYDROXY: CPT | Mod: ZL | Performed by: NURSE PRACTITIONER

## 2018-08-20 PROCEDURE — 87186 SC STD MICRODIL/AGAR DIL: CPT | Mod: ZL | Performed by: SURGERY

## 2018-08-20 PROCEDURE — 99203 OFFICE O/P NEW LOW 30 MIN: CPT | Performed by: SURGERY

## 2018-08-20 PROCEDURE — 87077 CULTURE AEROBIC IDENTIFY: CPT | Mod: ZL | Performed by: SURGERY

## 2018-08-20 PROCEDURE — 99000 SPECIMEN HANDLING OFFICE-LAB: CPT | Performed by: NURSE PRACTITIONER

## 2018-08-20 PROCEDURE — 87205 SMEAR GRAM STAIN: CPT | Mod: ZL | Performed by: SURGERY

## 2018-08-20 RX ORDER — CLINDAMYCIN HCL 300 MG
300 CAPSULE ORAL 3 TIMES DAILY
Qty: 30 CAPSULE | Refills: 0 | Status: SHIPPED | OUTPATIENT
Start: 2018-08-20 | End: 2018-08-20

## 2018-08-20 ASSESSMENT — PAIN SCALES - GENERAL: PAINLEVEL: EXTREME PAIN (8)

## 2018-08-20 NOTE — PATIENT INSTRUCTIONS
Thank you for allowing Dr. Orellana and our surgical team to participate in your care. Please call with any scheduling questions or concerns to Valorie at [phone] or for nursing questions Giovanna [phone].    Dressing changes should be twice a day with the iodoform packing strips.   On a daily basis, take out old dressing, shower like normal with no scrubbing of site. Pat dry. Insert new packing strip and a piece of gauze over top and tape in place. Call with any questions or concerns. Follow up will be scheduled for you.

## 2018-08-20 NOTE — NURSING NOTE
Chief Complaint   Patient presents with     Consult     referred by Jayce Encarnacion for infected cyst on mid back for 2 weeks, causing pain       Initial /82 (BP Location: Right arm, Patient Position: Sitting, Cuff Size: Adult Large)  Pulse 96  Temp 97.2  F (36.2  C) (Tympanic)  Resp 15  Ht 5' (1.524 m)  Wt 177 lb (80.3 kg)  SpO2 98%  BMI 34.57 kg/m2 Estimated body mass index is 34.57 kg/(m^2) as calculated from the following:    Height as of this encounter: 5' (1.524 m).    Weight as of this encounter: 177 lb (80.3 kg).  Medication Reconciliation: complete    Kimberly Reyes LPN

## 2018-08-20 NOTE — TELEPHONE ENCOUNTER
Start clindamycin which is sent to pharmacy.  Continue warm packs.  Referral is made to general surgery for evaluation.  Please get her in this week.

## 2018-08-20 NOTE — TELEPHONE ENCOUNTER
9:29 AM    Reason for Call: Phone Call    Description: Pt saw STEPHON Encarnacion for spot on back and was give rx, she said it is not helping, not getting any better.    Was an appointment offered for this call? no  If yes : Appointment type              Date    Preferred method for responding to this message: telephone  What is your phone number ?[phone]    If we cannot reach you directly, may we leave a detailed response at the number you provided? {YES /     Can this message wait until your PCP/provider returns, if available today?     Yoanna Whelan

## 2018-08-20 NOTE — MR AVS SNAPSHOT
After Visit Summary   8/20/2018    Lili Nava    MRN: [id number]           Patient Information     Date Of Birth          1970        Visit Information        Provider Department      8/20/2018 3:30 PM Bhaskar Orellana, DO [facility]        Today's Diagnoses     Infected cyst of skin        Vitamin D deficiency          Care Instructions    Thank you for allowing Dr. Orellana and our surgical team to participate in your care. Please call with any scheduling questions or concerns to Valorie at [phone] or for nursing questions Giovanna [phone].    Dressing changes should be twice a day with the iodoform packing strips.   On a daily basis, take out old dressing, shower like normal with no scrubbing of site. Pat dry. Insert new packing strip and a piece of gauze over top and tape in place. Call with any questions or concerns. Follow up will be scheduled for you.           Follow-ups after your visit        Your next 10 appointments already scheduled     Aug 29, 2018  1:00 PM CDT   (Arrive by 12:45 PM)   MA SCREENING DIGITAL BILATERAL with MTMA1   [facility] ([facility] )    [address]  [place] [address]   [phone]           Do not use any powder, lotion or deodorant under your arms or on your breast. If you do, we will ask you to remove it before your exam.  Wear comfortable, two-piece clothing.  If you have any allergies, tell your care team.  Bring any previous mammograms from other facilities or have them mailed to the breast center.            Oct 10, 2018  8:45 AM CDT   (Arrive by 8:30 AM)   SHORT with Daja Obrien NP   [facility] ([facility] )    [address] [address]   [phone]              Who to contact     If you have questions or need follow up information about today's clinic visit or your schedule please  contact [facility] directly at [phone].  Normal or non-critical lab and imaging results will be communicated to you by MyChart, letter or phone within 4 business days after the clinic has received the results. If you do not hear from us within 7 days, please contact the clinic through MyChart or phone. If you have a critical or abnormal lab result, we will notify you by phone as soon as possible.  Submit refill requests through [employer] or call your pharmacy and they will forward the refill request to us. Please allow 3 business days for your refill to be completed.          Additional Information About Your Visit        Sesameahar[facility]ursh Information     [employer] gives you secure access to your electronic health record. If you see a primary care provider, you can also send messages to your care team and make appointments. If you have questions, please call your primary care clinic.  If you do not have a primary care provider, please call [phone] and they will assist you.        Care EveryWhere ID     This is your Care EveryWhere ID. This could be used by other organizations to access your [facility] medical records  [id number]        Your Vitals Were     Pulse Temperature Respirations Height Pulse Oximetry BMI (Body Mass Index)    96 97.2  F (36.2  C) (Tympanic) 15 5' (1.524 m) 98% 34.57 kg/m2       Blood Pressure from Last 3 Encounters:   08/20/18 124/82   08/09/18 136/86   07/09/18 (!) 142/98    Weight from Last 3 Encounters:   08/20/18 177 lb (80.3 kg)   08/09/18 177 lb (80.3 kg)   07/09/18 177 lb 3.2 oz (80.4 kg)              We Performed the Following     Vitamin D Deficiency        Primary Care Provider Office Phone # Fax #    Daja Obrien [phone] [address] [facility] [address]        Equal Access to Services     DEMETRIUS FLORES AH: Kong Self, vance cunningham, qaybsoraida willson  la'bernard rob. So [facility] [phone].    ATENCIÓN: Si habla adan, tiene a mullins disposición servicios gratuitos de asistencia lingüística. Parul al [phone].    We comply with applicable federal civil rights laws and [place] laws. We do not discriminate on the basis of race, color, national origin, age, disability, sex, sexual orientation, or gender identity.            Thank you!     Thank you for choosing [facility]  for your care. Our goal is always to provide you with excellent care. Hearing back from our patients is one way we can continue to improve our services. Please take a few minutes to complete the written survey that you may receive in the mail after your visit with us. Thank you!             Your Updated Medication List - Protect others around you: Learn how to safely use, store and throw away your medicines at www.disposemymeds.org.          This list is accurate as of 8/20/18  3:53 PM.  Always use your most recent med list.                   Brand Name Dispense Instructions for use Diagnosis    amLODIPine 5 MG tablet    NORVASC    90 tablet    Take 1 tablet (5 mg) by mouth daily    Benign essential hypertension       aspirin 81 MG EC tablet     90 tablet    Take 1 tablet (81 mg) by mouth daily    Type 2 diabetes mellitus without complication, with long-term current use of insulin (H), Benign essential hypertension       BASAGLAR 100 UNIT/ML injection     15 mL    Inject 50 Units Subcutaneous 2 times daily    Type 2 diabetes mellitus without complication, with long-term current use of insulin (H)       blood glucose lancets standard    no brand specified    100 each    Use to test blood sugar once times daily or as directed.    Type 2 diabetes mellitus without complication, without long-term current use of insulin (H)       blood glucose monitoring meter device kit     1 kit    Use to test blood sugars 2 times daily or as directed.    Type 2 diabetes mellitus without complication,  without long-term current use of insulin (H)       blood glucose monitoring test strip    no brand specified    100 each    Use to test blood sugars 2 times daily or as directed    Type 2 diabetes mellitus without complication, without long-term current use of insulin (H)       escitalopram 20 MG tablet    LEXAPRO    30 tablet    Take 1 tablet (20 mg) by mouth daily    Anxiety       estradiol 10 MCG Tabs vaginal tablet    VAGIFEM    8 tablet    Place 1 tablet (10 mcg) vaginally twice a week    Vaginal dryness, menopausal       exenatide ER 2 MG recon vial kit susp for weekly inj    BYDUREON    4 kit    Inject 2 mg Subcutaneous once a week    Type 2 diabetes mellitus without complication, with long-term current use of insulin (H)       fluconazole 150 MG tablet    DIFLUCAN    4 tablet    Take one tablet once a week    Recurrent candidiasis of vagina       gabapentin 300 MG capsule    NEURONTIN    90 capsule    Take 1 tablet (300 mg) every night for 1-3 days, then 1 tablet twice daily for 1-3 days, then 1 tablet three times daily    Anxiety, Diabetic polyneuropathy associated with type 2 diabetes mellitus (H), Chronic bilateral low back pain with sciatica, sciatica laterality unspecified       hydrOXYzine 25 MG tablet    ATARAX    60 tablet    Take 1-2 tablets (25-50 mg) by mouth every 6 hours as needed for anxiety (or insomnia)    Anxiety       ibuprofen 800 MG tablet    ADVIL/MOTRIN    90 tablet    Take 1 tablet (800 mg) by mouth every 8 hours as needed for moderate pain    Tension headache       insulin pen needle 31G X 6 MM     100 each    Use 1 daily or as directed.    Type 2 diabetes mellitus without complication, without long-term current use of insulin (H)       lidocaine 5 % ointment    XYLOCAINE    750 g    Apply topically as needed for moderate pain    Chronic bilateral low back pain, with sciatica presence unspecified       losartan 100 MG tablet    COZAAR    30 tablet    TAKE 1 TABLET BY MOUTH DAILY     Benign essential hypertension       metFORMIN 500 MG 24 hr tablet    GLUCOPHAGE-XR    90 tablet    Take 1 tablet (500 mg) by mouth daily (with dinner)    Type 2 diabetes mellitus without complication, without long-term current use of insulin (H)       metoprolol succinate 100 MG 24 hr tablet    TOPROL-XL    90 tablet    Take 1 tablet (100 mg) by mouth daily    Benign essential hypertension       order for DME     1 each    Blood pressure monitor    Benign essential hypertension       oxybutynin 10 MG 24 hr tablet    DITROPAN-XL    30 tablet    TAKE 1 TABLET BY MOUTH DAILY    Mixed incontinence urge and stress (male)(female)       pioglitazone 30 MG tablet    ACTOS    90 tablet    Take 1 tablet (30 mg) by mouth daily    Type 2 diabetes mellitus with diabetic polyneuropathy, with long-term current use of insulin (H)       potassium chloride SA 20 MEQ CR tablet    K-DUR/KLOR-CON M    30 tablet    TAKE 1 TABLET BY MOUTH EVERY DAY    Hypokalemia       vitamin D 88032 UNIT capsule    ERGOCALCIFEROL    36 capsule    Take one capsule by mouth every Monday, Wednesday and Friday    Vitamin D deficiency

## 2018-08-20 NOTE — PROGRESS NOTES
Surgery Consult Clinic Note      RE: Lili Nava  : 1970    Chief Complaint:  Infected sebaceous cyst    History of Present Illness:  Mrs. Nava is a very pleasant 47 year old female who I am seeing at the request of aDja Obrien NP for evaluation of infected cyst and for consideration for I&D.  Saw primary a week and a half ago with pain between her shoulder blades.  She was started on keflex.  She called today with worsening symptoms.  She was started on clindamycin and given this appointment.  Associated with nausea, subjective fevers.  Started draining yesterday.    Medical history:  Past Medical History:   Diagnosis Date     Anxiety 2015     Benign essential hypertension 2015     Depressive disorder      Diabetic neuropathy associated with type 2 diabetes mellitus (H) 2016     Eczema 2016     Hyperlipidemia LDL goal <100 2017     Mild recurrent major depression (H) 2015     Mixed incontinence urge and stress (male)(female) 2017     Post traumatic stress disorder 2015     Type 2 diabetes, HbA1c goal < 7% (H) 2015       Surgical history:  Past Surgical History:   Procedure Laterality Date     COLONOSCOPY  2013     HYSTERECTOMY N/A 2008       Family history:  Family History   Problem Relation Age of Onset     Hypertension Mother      Depression Mother      Coronary Artery Disease Father        Medications:  Prior to Admission medications    Medication Sig Start Date End Date Taking? Authorizing Provider   amLODIPine (NORVASC) 5 MG tablet Take 1 tablet (5 mg) by mouth daily 18  Yes Daja Obrien NP   aspirin 81 MG EC tablet Take 1 tablet (81 mg) by mouth daily 18  Yes Daja Obrien NP   BASAGLAR 100 UNIT/ML injection Inject 50 Units Subcutaneous 2 times daily 18  Yes Daja Obrien NP   blood glucose (NO BRAND SPECIFIED) lancets standard Use to test blood sugar once times daily or as  directed. 8/14/17  Yes Daja Obrien NP   blood glucose monitoring (NO BRAND SPECIFIED) test strip Use to test blood sugars 2 times daily or as directed 8/14/17  Yes Daja Obrien NP   blood glucose monitoring (ONE TOUCH ULTRA MINI) meter device kit Use to test blood sugars 2 times daily or as directed. 8/14/17  Yes Daja Obrien, NP   escitalopram (LEXAPRO) 20 MG tablet Take 1 tablet (20 mg) by mouth daily 5/4/17  Yes Daja Obrien NP   estradiol (VAGIFEM) 10 MCG TABS vaginal tablet Place 1 tablet (10 mcg) vaginally twice a week 7/9/18  Yes Daja Obrien, NP   exenatide ER (BYDUREON) 2 MG recon vial kit susp for weekly inj Inject 2 mg Subcutaneous once a week 4/4/18  Yes Daja Obrien, NP   fluconazole (DIFLUCAN) 150 MG tablet Take one tablet once a week 11/20/17  Yes Daja Obrien NP   gabapentin (NEURONTIN) 300 MG capsule Take 1 tablet (300 mg) every night for 1-3 days, then 1 tablet twice daily for 1-3 days, then 1 tablet three times daily 1/31/18  Yes Daja Obrien, NP   hydrOXYzine (ATARAX) 25 MG tablet Take 1-2 tablets (25-50 mg) by mouth every 6 hours as needed for anxiety (or insomnia) 4/20/17  Yes Daja Obrien NP   ibuprofen (ADVIL/MOTRIN) 800 MG tablet Take 1 tablet (800 mg) by mouth every 8 hours as needed for moderate pain 5/21/18  Yes Daja Obrien, NP   insulin pen needle 31G X 6 MM Use 1 daily or as directed. 8/14/17  Yes Daja Obrien, NP   lidocaine (XYLOCAINE) 5 % ointment Apply topically as needed for moderate pain 6/19/18  Yes Daja Obrien, NP   losartan (COZAAR) 100 MG tablet TAKE 1 TABLET BY MOUTH DAILY 6/1/18  Yes Daja Obrien NP   metFORMIN (GLUCOPHAGE-XR) 500 MG 24 hr tablet Take 1 tablet (500 mg) by mouth daily (with dinner) 7/9/18  Yes Daja Obrien NP   order for DME Blood pressure monitor 8/14/17  Yes Daja Obrien  MAHI, NP   oxybutynin (DITROPAN-XL) 10 MG 24 hr tablet TAKE 1 TABLET BY MOUTH DAILY 12/1/17  Yes Daja Obrien NP   pioglitazone (ACTOS) 30 MG tablet Take 1 tablet (30 mg) by mouth daily 7/10/18  Yes Daja Obrien, NP   potassium chloride SA (K-DUR/KLOR-CON M) 20 MEQ CR tablet TAKE 1 TABLET BY MOUTH EVERY DAY 8/17/18  Yes Daja Obrien NP   vitamin D (ERGOCALCIFEROL) 23834 UNIT capsule Take one capsule by mouth every Monday, Wednesday and Friday 4/24/17  Yes Daja Obrien, NP   metoprolol succinate (TOPROL-XL) 100 MG 24 hr tablet Take 1 tablet (100 mg) by mouth daily 5/18/18   Daja Obrien NP       Allergies:  The patientis allergic to celexa [citalopram] and lisinopril.  .  Social history:  Social History   Substance Use Topics     Smoking status: Current Some Day Smoker     Types: Cigarettes     Smokeless tobacco: Never Used     Alcohol use Yes      Comment: rare     Marital status: .    Review of Systems:    Constitutional: Per HPI  HENT: Negative for ear pain, nosebleeds, congestion, sore throat, tinnitus and ear discharge.    Eyes: Negative for blurred vision, double vision, photophobia and pain.   Respiratory: Negative for cough, hemoptysis, shortness of breath, wheezing and stridor.    Cardiovascular: Negative for chest pain, palpitations and orthopnea.   Gastrointestinal: Per HPI  Genitourinary: Negative for urgency, frequency and hematuria.   Musculoskeletal: Per HPI  Neurological: Negative for tingling, speech change and headaches.   Endo/Heme/Allergies: Does not bruise/bleed easily.   Psychiatric/Behavioral: Negative for depression, suicidal ideas and hallucinations. The patient is not nervous/anxious.    Physical Examination:  /82 (BP Location: Right arm, Patient Position: Sitting, Cuff Size: Adult Large)  Pulse 96  Temp 97.2  F (36.2  C) (Tympanic)  Resp 15  Ht 5' (1.524 m)  Wt 177 lb (80.3 kg)  SpO2 98%  BMI 34.57  kg/m2  General: AAOx4, NAD, WN/WD, ambulating without assistance, non toxic looking  HEENT:NCAT, EOMI, PERRL Sclerae anicteric; Trachea mideline, no JVD  Extremities: Cursory exam unremarkable.  Skin: Warm, dry, < 2 sec cap refill.  Mid back T3-4 5 cm area of erythema, fluctuance and purulence   Neuro: CN 2-12 grossly intact, no focal deficit, GCS 15  Psych: happy, calm, asks appropriate questions    # Pain Assessment:   - Lili is experiencing pain due to abscess. Pain management was discussed and the plan was created in a collaborative fashion.  Lili's response to the current recommendations: engaged  - Please see the plan for pain management as documented above          Assessment/Plan:  #1 Infected sebaceous cyst  #2 DM II with insulin  #3 Obesity   #4 HTN    Spontaneously draining, but given she's a diabetic, incision and drainage will help promote drainage and will heal faster.  The risks, including but not limited to, bleeding, infection, recurrence, need for further resection, wound healing, scar formation and chronic pain have all been discussed with the patient. She voiced understanding of all these risks and there were no barriers to patient education.    After a procedural pause was conducted, the area was prepped and draped in the usual sterile fashion.  Local anesthetic was infiltrated around the back skin abscess in an effort for a field block.   After the anesthetic had taken affect, an cruciate incision was made with expression of a small amount of purulence.  The wound was explored with mosquito and a cavity very deep was encountered.  Cultures were taken.  The wound was packed with 1/4 in iodoform and covered with dry 4x4 guaze.  She was instructed to change this once a day.  Showers and baths encouraged.  She was instructed to keep an eye on her blood sugars and to return with any worsening, questions and concerns.  She voiced understanding.         Dr Orellana  [facility]  [facility]  [address], [address]  [place]    [address]    Referring Provider:  Daja Obrien, BAKARI  [address] [place] [address]     Primary Care Provider:  Daja Obrien

## 2018-08-21 ENCOUNTER — TELEPHONE (OUTPATIENT)
Dept: SURGERY | Facility: OTHER | Age: 48
End: 2018-08-21

## 2018-08-21 LAB
GRAM STN SPEC: NORMAL
SPECIMEN SOURCE: NORMAL

## 2018-08-21 NOTE — TELEPHONE ENCOUNTER
Writer spoke with patient. She is feeling nauseous, weak and just malaise feeling. Her sugars are up a little bit but she doesn't have her new meter. She does not have a fever that she is aware of. She is starting clindamycin today and writer also instructed her to start a probiotic while on antibiotics. Patient was offered an appointment. Patient felt she was going to try and go into work, but would call us if she wanted to come in and be seen. We would be drawing labs at that time if she did come in.     Giovanna Barone

## 2018-08-21 NOTE — TELEPHONE ENCOUNTER
Patient left  on writers phone and stated that she would like a call back from Dr Orellana nurse. She is feeling weak and nauseous after yesterdays procedure. Please call patient back at  [phone]

## 2018-08-22 LAB — DEPRECATED CALCIDIOL+CALCIFEROL SERPL-MC: 32 UG/L (ref 20–75)

## 2018-08-23 ENCOUNTER — OFFICE VISIT (OUTPATIENT)
Dept: SURGERY | Facility: OTHER | Age: 48
End: 2018-08-23
Attending: SURGERY
Payer: COMMERCIAL

## 2018-08-23 VITALS
DIASTOLIC BLOOD PRESSURE: 78 MMHG | WEIGHT: 181.6 LBS | OXYGEN SATURATION: 96 % | SYSTOLIC BLOOD PRESSURE: 120 MMHG | BODY MASS INDEX: 35.65 KG/M2 | TEMPERATURE: 97.5 F | HEIGHT: 60 IN | HEART RATE: 86 BPM

## 2018-08-23 DIAGNOSIS — L08.9 INFECTED SEBACEOUS CYST OF SKIN: Primary | ICD-10-CM

## 2018-08-23 DIAGNOSIS — L72.3 INFECTED SEBACEOUS CYST OF SKIN: Primary | ICD-10-CM

## 2018-08-23 LAB
BACTERIA SPEC CULT: ABNORMAL
SPECIMEN SOURCE: ABNORMAL

## 2018-08-23 PROCEDURE — 99213 OFFICE O/P EST LOW 20 MIN: CPT | Performed by: SURGERY

## 2018-08-23 PROCEDURE — G0463 HOSPITAL OUTPT CLINIC VISIT: HCPCS

## 2018-08-23 ASSESSMENT — PAIN SCALES - GENERAL: PAINLEVEL: NO PAIN (0)

## 2018-08-23 NOTE — NURSING NOTE
Chief Complaint   Patient presents with     Follow Up For     pt is following up for an infected sebaceous cyst       Initial /78 (BP Location: Right arm, Cuff Size: Adult Large)  Pulse 86  Temp 97.5  F (36.4  C) (Tympanic)  Ht 5' (1.524 m)  Wt 181 lb 9.6 oz (82.4 kg)  SpO2 96%  BMI 35.47 kg/m2 Estimated body mass index is 35.47 kg/(m^2) as calculated from the following:    Height as of this encounter: 5' (1.524 m).    Weight as of this encounter: 181 lb 9.6 oz (82.4 kg).  Medication Reconciliation: complete    Moni Jones LPN

## 2018-08-23 NOTE — MR AVS SNAPSHOT
After Visit Summary   8/23/2018    Lili Nava    MRN: [id number]           Patient Information     Date Of Birth          1970        Visit Information        Provider Department      8/23/2018 10:00 AM Bhaskar Orellana, DO [facility]        Today's Diagnoses     Infected sebaceous cyst of skin    -  1       Follow-ups after your visit        Your next 10 appointments already scheduled     Aug 29, 2018  1:00 PM CDT   (Arrive by 12:45 PM)   MA SCREENING DIGITAL BILATERAL with MTMA1   [facility] ([facility] )    [address] [address]   [phone]           Do not use any powder, lotion or deodorant under your arms or on your breast. If you do, we will ask you to remove it before your exam.  Wear comfortable, two-piece clothing.  If you have any allergies, tell your care team.  Bring any previous mammograms from other facilities or have them mailed to the breast center.            Oct 10, 2018  8:45 AM CDT   (Arrive by 8:30 AM)   SHORT with Daja Obrien NP   [facility] ([facility] )    [address] [address]   [phone]              Who to contact     If you have questions or need follow up information about today's clinic visit or your schedule please contact [facility] (formerly [facility]) directly at [phone].  Normal or non-critical lab and imaging results will be communicated to you by MyChart, letter or phone within 4 business days after the clinic has received the results. If you do not hear from us within 7 days, please contact the clinic through MyChart or phone. If you have a critical or abnormal lab result, we will notify you by phone as soon as possible.  Submit refill requests through [employer] or call your pharmacy and they will forward the refill request to us. Please allow 3 business days for your refill to be  completed.          Additional Information About Your Visit        [employer] Information     [employer] gives you secure access to your electronic health record. If you see a primary care provider, you can also send messages to your care team and make appointments. If you have questions, please call your primary care clinic.  If you do not have a primary care provider, please call [phone] and they will assist you.        Care EveryWhere ID     This is your Care EveryWhere ID. This could be used by other organizations to access your [facility] medical records  [id number]        Your Vitals Were     Pulse Temperature Height Pulse Oximetry BMI (Body Mass Index)       86 97.5  F (36.4  C) (Tympanic) 5' (1.524 m) 96% 35.47 kg/m2        Blood Pressure from Last 3 Encounters:   08/23/18 120/78   08/20/18 124/82   08/09/18 136/86    Weight from Last 3 Encounters:   08/23/18 181 lb 9.6 oz (82.4 kg)   08/20/18 177 lb (80.3 kg)   08/09/18 177 lb (80.3 kg)              Today, you had the following     No orders found for display       Primary Care Provider Office Phone # Fax #    Daja DURHAMJennifer Obrien [phone] [address] [address]        Equal Access to Services     [facility] SANDRA : Hadii angeline ku hadasho Soomaali, waaxda luqadaha, qaybta kaalmada adeegyada, soraida reddy haybernard duffy . So [facility] [phone].    ATENCIÓN: Si habla español, tiene a mullins disposición servicios gratuitos de asistencia lingüística. Llame al [phone].    We comply with applicable federal civil rights laws and [place] laws. We do not discriminate on the basis of race, color, national origin, age, disability, sex, sexual orientation, or gender identity.            Thank you!     [facility] you [facility]  for your [facility]. Our goal is always to provide you with excellent care. Hearing back from our patients is one way we can continue to improve our services.  Please take a few minutes to complete the written survey that you may receive in the mail after your visit with us. Thank you!             Your Updated Medication List - Protect others around you: Learn how to safely use, store and throw away your medicines at www.disposemymeds.org.          This list is accurate as of 8/23/18 10:10 AM.  Always use your most recent med list.                   Brand Name Dispense Instructions for use Diagnosis    amLODIPine 5 MG tablet    NORVASC    90 tablet    Take 1 tablet (5 mg) by mouth daily    Benign essential hypertension       aspirin 81 MG EC tablet     90 tablet    Take 1 tablet (81 mg) by mouth daily    Type 2 diabetes mellitus without complication, with long-term current use of insulin (H), Benign essential hypertension       BASAGLAR 100 UNIT/ML injection     15 mL    Inject 50 Units Subcutaneous 2 times daily    Type 2 diabetes mellitus without complication, with long-term current use of insulin (H)       blood glucose lancets standard    no brand specified    100 each    Use to test blood sugar once times daily or as directed.    Type 2 diabetes mellitus without complication, without long-term current use of insulin (H)       blood glucose monitoring meter device kit     1 kit    Use to test blood sugars 2 times daily or as directed.    Type 2 diabetes mellitus without complication, without long-term current use of insulin (H)       blood glucose monitoring test strip    no brand specified    100 each    Use to test blood sugars 2 times daily or as directed    Type 2 diabetes mellitus without complication, without long-term current use of insulin (H)       escitalopram 20 MG tablet    LEXAPRO    30 tablet    Take 1 tablet (20 mg) by mouth daily    Anxiety       estradiol 10 MCG Tabs vaginal tablet    VAGIFEM    8 tablet    Place 1 tablet (10 mcg) vaginally twice a week    Vaginal dryness, menopausal       exenatide ER 2 MG recon vial kit susp for weekly inj     BYDUREON    4 kit    Inject 2 mg Subcutaneous once a week    Type 2 diabetes mellitus without complication, with long-term current use of insulin (H)       fluconazole 150 MG tablet    DIFLUCAN    4 tablet    Take one tablet once a week    Recurrent candidiasis of vagina       gabapentin 300 MG capsule    NEURONTIN    90 capsule    Take 1 tablet (300 mg) every night for 1-3 days, then 1 tablet twice daily for 1-3 days, then 1 tablet three times daily    Anxiety, Diabetic polyneuropathy associated with type 2 diabetes mellitus (H), Chronic bilateral low back pain with sciatica, sciatica laterality unspecified       hydrOXYzine 25 MG tablet    ATARAX    60 tablet    Take 1-2 tablets (25-50 mg) by mouth every 6 hours as needed for anxiety (or insomnia)    Anxiety       ibuprofen 800 MG tablet    ADVIL/MOTRIN    90 tablet    Take 1 tablet (800 mg) by mouth every 8 hours as needed for moderate pain    Tension headache       insulin pen needle 31G X 6 MM     100 each    Use 1 daily or as directed.    Type 2 diabetes mellitus without complication, without long-term current use of insulin (H)       lidocaine 5 % ointment    XYLOCAINE    750 g    Apply topically as needed for moderate pain    Chronic bilateral low back pain, with sciatica presence unspecified       losartan 100 MG tablet    COZAAR    30 tablet    TAKE 1 TABLET BY MOUTH DAILY    Benign essential hypertension       metFORMIN 500 MG 24 hr tablet    GLUCOPHAGE-XR    90 tablet    Take 1 tablet (500 mg) by mouth daily (with dinner)    Type 2 diabetes mellitus without complication, without long-term current use of insulin (H)       metoprolol succinate 100 MG 24 hr tablet    TOPROL-XL    90 tablet    Take 1 tablet (100 mg) by mouth daily    Benign essential hypertension       order for DME     1 each    Blood pressure monitor    Benign essential hypertension       oxybutynin 10 MG 24 hr tablet    DITROPAN-XL    30 tablet    TAKE 1 TABLET BY MOUTH DAILY    Mixed  incontinence urge and stress (male)(female)       pioglitazone 30 MG tablet    ACTOS    90 tablet    Take 1 tablet (30 mg) by mouth daily    Type 2 diabetes mellitus with diabetic polyneuropathy, with long-term current use of insulin (H)       potassium chloride SA 20 MEQ CR tablet    K-DUR/KLOR-CON M    30 tablet    TAKE 1 TABLET BY MOUTH EVERY DAY    Hypokalemia       vitamin D 23247 UNIT capsule    ERGOCALCIFEROL    36 capsule    Take one capsule by mouth every Monday, Wednesday and Friday    Vitamin D deficiency

## 2018-08-23 NOTE — PROGRESS NOTES
S: Here for wound check.  S/P I&D infected sebaceous cyst on back.  Growing out proteus mirabilis. Denies fevers, chills.  Still having nausea, but overall feels better.  Been unable to doing packing    O:  /78 (BP Location: Right arm, Cuff Size: Adult Large)  Pulse 86  Temp 97.5  F (36.4  C) (Tympanic)  Ht 5' (1.524 m)  Wt 181 lb 9.6 oz (82.4 kg)  SpO2 96%  BMI 35.47 kg/m2   Gen: AAOx4, NAD, ambulating without assistance, non toxic  Wound: probes 2cm deep, no purulence, decreasing erythema, easily packed with 1/4 iodoform    A/P  #1 Infected sebaceous cyst    Looks improved.  Feels better.  Told her to stop the clindamycin.  If she can do packing then we shouldn't have to do antibiotics, but if she's still unable to do packing, then will need to start bactrim.

## 2018-08-28 ENCOUNTER — HEALTH MAINTENANCE LETTER (OUTPATIENT)
Age: 48
End: 2018-08-28

## 2018-08-29 ENCOUNTER — RADIANT APPOINTMENT (OUTPATIENT)
Dept: MAMMOGRAPHY | Facility: OTHER | Age: 48
End: 2018-08-29
Attending: NURSE PRACTITIONER
Payer: COMMERCIAL

## 2018-08-29 DIAGNOSIS — Z12.31 ENCOUNTER FOR SCREENING MAMMOGRAM FOR BREAST CANCER: ICD-10-CM

## 2018-08-29 PROCEDURE — 77067 SCR MAMMO BI INCL CAD: CPT | Mod: TC

## 2018-10-11 ENCOUNTER — MYC REFILL (OUTPATIENT)
Dept: FAMILY MEDICINE | Facility: OTHER | Age: 48
End: 2018-10-11

## 2018-10-11 DIAGNOSIS — G44.209 TENSION HEADACHE: ICD-10-CM

## 2018-10-12 NOTE — PROGRESS NOTES
SUBJECTIVE:   Lili Nava is a 48 year old female who presents to clinic today for the following health issues:      Diabetes Follow-up    Patient is checking blood sugars: three times daily.   Blood sugar testing frequency justification: Uncontrolled diabetes  Results are as follows:         am - 156         lunchtime - 150-160         suppertime - 180's    Diabetic concerns: None     Symptoms of hypoglycemia (low blood sugar): none     Paresthesias (numbness or burning in feet) or sores: Yes      Date of last diabetic eye exam: 8/8/17    Diabetes Management Resources    Hyperlipidemia Follow-Up      Rate your low fat/cholesterol diet?: fair    Taking statin?  Yes, no muscle aches from statin    Other lipid medications/supplements?:  None  The 10-year ASCVD risk score ([place] FREEMAN Jr, et al., 2013) is: 5.5%    Values used to calculate the score:      Age: 48 years      Sex: Female      Is Non- : No      Diabetic: Yes      Tobacco smoker: Yes      Systolic Blood Pressure: 124 mmHg      Is BP treated: Yes      HDL Cholesterol: 54 mg/dL        Total Cholesterol: 156 mg/dL        Hypertension Follow-up      Outpatient blood pressures are not being checked.    Low Salt Diet: no added salt    BP Readings from Last 2 Encounters:   08/23/18 120/78   08/20/18 124/82     Hemoglobin A1C (%)   Date Value   07/09/2018 8.1 (H)   05/18/2018 9.3 (H)     LDL Cholesterol Calculated (mg/dL)   Date Value   07/09/2018 84   05/18/2018 80     Depression and Anxiety Follow-Up    Status since last visit: Worsened     Other associated symptoms:panic attacks     Complicating factors:     Significant life event: Yes-  Daughter mental health and drug abuse issues.        Current substance abuse: None    PHQ 7/9/2018 8/9/2018 10/15/2018   PHQ-9 Total Score 10 9 18   Q9: Suicide Ideation Not at all Not at all More than half the days   F/U: Thoughts of suicide or self-harm - - No   F/U: Safety concerns - - No     [place]IR[place]-7  SCORE 7/9/2018 8/9/2018 10/15/2018   Total Score - - -   Total Score 7 8 14     PHQ-9  English  PHQ-9   Any Language  VIRGINIA-7  Suicide Assessment Five-step Evaluation and Treatment (SAFE-T)    Amount of exercise or physical activity: 6-7 days/week for an average of 15-30 minutes    Problems taking medications regularly: No    Medication side effects: none    Diet: diabetic    Stress/urge incontinence - well controlled with current plan      Problem list and histories reviewed & adjusted, as indicated.  Additional history: as documented    Patient Active Problem List   Diagnosis     Type 2 diabetes mellitus with diabetic polyneuropathy, with long-term current use of insulin (H)     Benign essential hypertension     Anxiety     Mild recurrent major depression (H)     Post traumatic stress disorder     Diabetic neuropathy associated with type 2 diabetes mellitus (H)     Primary insomnia     Eczema     Vitamin D deficiency     Mixed incontinence urge and stress (male)(female)     Hyperlipidemia LDL goal <100     Past Surgical History:   Procedure Laterality Date     COLONOSCOPY  01/01/2013     GYN SURGERY  2006    hysterectomy     HYSTERECTOMY N/A 01/01/2008       Social History   Substance Use Topics     Smoking status: Current Some Day Smoker     Types: Cigarettes     Smokeless tobacco: Never Used     Alcohol use Yes      Comment: rare     Family History   Problem Relation Age of Onset     Hypertension Mother      Depression Mother      Coronary Artery Disease Father          Current Outpatient Prescriptions   Medication Sig Dispense Refill     amLODIPine (NORVASC) 5 MG tablet Take 1 tablet (5 mg) by mouth daily 90 tablet 1     aspirin 81 MG EC tablet Take 1 tablet (81 mg) by mouth daily 90 tablet 3     BASAGLAR 100 UNIT/ML injection Inject 50 Units Subcutaneous 2 times daily 15 mL 3     blood glucose (NO BRAND SPECIFIED) lancets standard Use to test blood sugar once times daily or as directed. 100 each 11     blood  glucose monitoring (NO BRAND SPECIFIED) test strip Use to test blood sugars 2 times daily or as directed 100 each 11     blood glucose monitoring (ONE TOUCH ULTRA MINI) meter device kit Use to test blood sugars 2 times daily or as directed. 1 kit 0     escitalopram (LEXAPRO) 20 MG tablet Take 1 tablet (20 mg) by mouth daily 30 tablet 5     estradiol (VAGIFEM) 10 MCG TABS vaginal tablet Place 1 tablet (10 mcg) vaginally twice a week 8 tablet 3     exenatide ER (BYDUREON) 2 MG recon vial kit susp for weekly inj Inject 2 mg Subcutaneous once a week 4 kit 11     fluconazole (DIFLUCAN) 150 MG tablet Take one tablet once a week 4 tablet 5     gabapentin (NEURONTIN) 300 MG capsule Take 1 tablet (300 mg) every night for 1-3 days, then 1 tablet twice daily for 1-3 days, then 1 tablet three times daily 90 capsule 0     hydrOXYzine (ATARAX) 25 MG tablet Take 1-2 tablets (25-50 mg) by mouth every 6 hours as needed for anxiety (or insomnia) 60 tablet 1     ibuprofen (ADVIL/MOTRIN) 800 MG tablet Take 1 tablet (800 mg) by mouth every 8 hours as needed for moderate pain 90 tablet 3     insulin pen needle 31G X 6 MM Use 1 daily or as directed. 100 each prn     lidocaine (XYLOCAINE) 5 % ointment Apply topically as needed for moderate pain 750 g 4     losartan (COZAAR) 100 MG tablet TAKE 1 TABLET BY MOUTH DAILY 30 tablet 5     metFORMIN (GLUCOPHAGE-XR) 500 MG 24 hr tablet Take 1 tablet (500 mg) by mouth daily (with dinner) 90 tablet 1     metoprolol succinate (TOPROL-XL) 100 MG 24 hr tablet Take 1 tablet (100 mg) by mouth daily 90 tablet 3     order for DME Blood pressure monitor 1 each 0     oxybutynin (DITROPAN-XL) 10 MG 24 hr tablet TAKE 1 TABLET BY MOUTH DAILY 30 tablet 11     pioglitazone (ACTOS) 30 MG tablet Take 1 tablet (30 mg) by mouth daily 90 tablet 1     potassium chloride SA (K-DUR/KLOR-CON M) 20 MEQ CR tablet TAKE 1 TABLET BY MOUTH EVERY DAY 30 tablet 9     vitamin D (ERGOCALCIFEROL) 10423 UNIT capsule Take one capsule  by mouth every Monday, Wednesday and Friday 36 capsule 0     Allergies   Allergen Reactions     Celexa [Citalopram] Other (See Comments)     jittery     Lisinopril Cough     Recent Labs   Lab Test  07/09/18   0938  05/18/18   1608  11/20/17   1112   A1C  8.1*  9.3*  9.7*   LDL  84  80  93   HDL  54  39*  49*   TRIG  89  170*  132   ALT  34  37  36   CR  0.74  0.60  0.53   GFRESTIMATED  84  >90  >90   GFRESTBLACK  >90  >90  >90   POTASSIUM  4.2  3.9  3.9   TSH  2.06  1.79  2.98      BP Readings from Last 3 Encounters:   10/15/18 124/80   08/23/18 120/78   08/20/18 124/82    Wt Readings from Last 3 Encounters:   10/15/18 184 lb 9.6 oz (83.7 kg)   08/23/18 181 lb 9.6 oz (82.4 kg)   08/20/18 177 lb (80.3 kg)                    Reviewed and updated as needed this visit by clinical staff       Reviewed and updated as needed this visit by Provider         ROS:  Constitutional, HEENT, cardiovascular, pulmonary, gi and gu systems are negative, except as otherwise noted.    OBJECTIVE:     /80 (BP Location: Left arm, Patient Position: Chair, Cuff Size: Adult Large)  Pulse 86  Temp 97.6  F (36.4  C) (Tympanic)  Resp 16  Ht 5' (1.524 m)  Wt 184 lb 9.6 oz (83.7 kg)  SpO2 97%  BMI 36.05 kg/m2  Body mass index is 36.05 kg/(m^2).  GENERAL: healthy, alert and no distress  NECK: no adenopathy, no asymmetry, masses, or scars, thyroid normal to palpation and no carotid bruits  RESP: lungs clear to auscultation - no rales, rhonchi or wheezes  CV: regular rate and rhythm, normal S1 S2, no S3 or S4, no murmur, click or rub, no peripheral edema and peripheral pulses strong  MS: no gross musculoskeletal defects noted, no edema  PSYCH: mentation appears normal, affect normal/bright      ASSESSMENT/PLAN:       1. Tobacco abuse  Cessation encouraged  - Tobacco Cessation - Order to Satisfy Health Maintenance    2. Tobacco abuse counseling    3. Type 2 diabetes mellitus with diabetic polyneuropathy, with long-term current use of  insulin (H)  chronic  - HEMOGLOBIN A1C  - STATIN NOT PRESCRIBED, INTENTIONAL,; Please choose reason not prescribed, below    4. Morbid obesity (H)  Weight loss encouraged    5. Hyperlipidemia LDL goal <100  chronic  - LIPID PANEL  - STATIN NOT PRESCRIBED, INTENTIONAL,; Please choose reason not prescribed, below    6. Benign essential hypertension  chronic  - TSH with free T4 reflex  - BASIC METABOLIC PANEL    7. Mild recurrent major depression (H)  continue current plan  Counseling - continue    8. Mixed incontinence urge and stress (male)(female)  Continue current plan    9. Anxiety  Continue current plan    Flu vaccine - will update today      FUTURE APPOINTMENTS:       - Follow-up visit in 3 months or as needed for acute concerns.     Daja Obrien, NP  [facility]

## 2018-10-12 NOTE — PATIENT INSTRUCTIONS
ASSESSMENT/PLAN:       1. Tobacco abuse  Cessation encouraged  - Tobacco Cessation - Order to Satisfy Health Maintenance    2. Tobacco abuse counseling    3. Type 2 diabetes mellitus with diabetic polyneuropathy, with long-term current use of insulin (H)  chronic  - HEMOGLOBIN A1C  - STATIN NOT PRESCRIBED, INTENTIONAL,; Please choose reason not prescribed, below    4. Morbid obesity (H)  Weight loss encouraged    5. Hyperlipidemia LDL goal <100  chronic  - LIPID PANEL  - STATIN NOT PRESCRIBED, INTENTIONAL,; Please choose reason not prescribed, below    6. Benign essential hypertension  chronic  - TSH with free T4 reflex  - BASIC METABOLIC PANEL    7. Mild recurrent major depression (H)  continue current plan  Counseling - continue    8. Mixed incontinence urge and stress (male)(female)  Continue current plan    9. Anxiety  Continue current plan    Flu vaccine - will update today      FUTURE APPOINTMENTS:       - Follow-up visit in 3 months or as needed for acute concerns.     Daja Obrien NP  [facility]        HOW TO QUIT SMOKING  Smoking is one of the hardest habits to break. About half of all those who have ever smoked have been able to quit, and most of those (about 70%) who still smoke want to quit. Here are some of the best ways to stop smoking.     KEEP TRYING:  It takes most smokers about 8 tries before they are finally able to fully quit. So, the more often you try and fail, the better your chance of quitting the next time! So, don't give up!    GO COLD TURKEY:  Most ex-smokers quit cold turkey. Trying to cut back gradually doesn't seem to work as well, perhaps because it continues the smoking habit. Also, it is possible to fool yourself by inhaling more while smoking fewer cigarettes. This results in the same amount of nicotine in your body!    GET SUPPORT:  Support programs can make an important difference, especially for the heavy smoker. These groups offer lectures,  methods to change your behavior and peer support. Call the free national Quitline for more information. 800-QUIT-NOW (232-906-2842). Low-cost or free programs are offered by many hospitals, local chapters of the American Lung Association (845-312-3861) and the American Cancer Society (045-493-2871). Support at home is important too. Non-smokers can help by offering praise and encouragement. If the smoker fails to quit, encourage them to try again!    OVER-THE-COUNTER MEDICINES:  For those who can't quit on their own, Nicotine Replacement Therapy (NRT) may make quitting much easier. Certain aids such as the nicotine patch, gum and lozenge are available without a prescription. However, it is best to use these under the guidance of your doctor. The skin patch provides a steady supply of nicotine to the body. Nicotine gum and lozenge gives temporary bursts of low levels of nicotine. Both methods take the edge off the craving for cigarettes. WARNING: If you feel symptoms of nicotine overdose, such as nausea, vomiting, dizziness, weakness, or fast heartbeat, stop using these and see your doctor.    PRESCRIPTION MEDICINES:  After evaluating your smoking patterns and prior attempts at quitting, your doctor may offer a prescription medicine such as bupropion (Zyban, Wellbutrin), varenicline (Chantix, Champix), a niocotine inhaler or nasal spray. Each has its unique advantage and side effects which your doctor can review with you.    HEALTH BENEFITS OF QUITTING:  The benefits of quitting start right away and keep improving the longer you go without smokin minutes: blood pressure and pulse return to normal  8 hours: oxygen levels return to normal  2 days: ability to smell and taste begins to improve as damaged nerves start to regrow  2-3 weeks: circulation and lung function improves  1-9 months: decreased cough, congestion and shortness of breath; less tired  1 year: risk of heart attack decreases by half  5 years: risk  of lung cancer decreases by half; risk of stroke becomes the same as a non-smoker  For information about how to quit smoking, visit the following links:  National Cancer [place] ,   Clearing the Air, Quit Smoking Today   - an online booklet. http://www.smokefree.gov/pubs/clearing_the_air.pdf  Smokefree.gov http://smokefree.gov/  QuitNet http://www.quitnet.com/    6769-6457 [employer], [address] [place] [address], [place] [address]. All rights reserved. This information is not intended as a substitute for professional medical care. Always follow your healthcare professional's instructions.    The Benefits of Living Smoke Free  What do you want to gain from quitting? Check off some reasons to quit.  Health Benefits  ___ Reduce my risk of lung cancer, heart disease, chronic lung disease  ___ Have fewer wrinkles and softer skin  ___ Improve my sense of taste and smell  ___ For pregnant women--reduce the risk of having a miscarriage, stillbirth, premature birth, or low-birth-weight baby  Personal Benefits  ___ Feel more in control of my life  ___ Have better-smelling hair, breath, clothes, home, and car  ___ Save time by not having to take smoke breaks, buy cigarettes, or hunt for a light  ___ Have whiter teeth  Family Benefits  ___ Reduce my children s respiratory tract infections  ___ Set a good example for my children  ___ Reduce my family s cancer risk  Financial Benefits  ___ Save hundreds of dollars each year that would be spent on cigarettes  ___ Save money on medical bills  ___ Save on life, health, and car insurance premiums    Those Dollars Add Up!  Cigarettes are expensive, and getting more expensive all the time. Do you realize how much money you are spending on cigarettes per year? What is the average amount you spend on a pack of cigarettes? What is the average number of packs that you smoke per day? Using your answers to these questions, fill in this formula to help you find out:  ($ _____ per pack) ×  (  _____ number of packs per day) × (365 days) =  $ _____ yearly cost of smoking  Besides tobacco, there are other costs, including extra cleaning bills and replacement costs for clothing and furniture; medical expenses for smoking-related illnesses; and higher health, life, and car insurance premiums.    Cigars and Pipes Count Too!  Cigars and pipes are also dangerous. So are smokeless (chewing) tobacco and snuff. All of these products contain nicotine, a highly addictive substance that has harmful effects on your body. Quitting smoking means giving up all tobacco products.      1794-2704 [facility], [address] [place] [address], [place] [address]. All rights reserved. This information is not intended as a substitute for professional medical care. Always follow your healthcare professional's instructions.

## 2018-10-15 ENCOUNTER — OFFICE VISIT (OUTPATIENT)
Dept: FAMILY MEDICINE | Facility: OTHER | Age: 48
End: 2018-10-15
Attending: NURSE PRACTITIONER
Payer: COMMERCIAL

## 2018-10-15 VITALS
OXYGEN SATURATION: 97 % | RESPIRATION RATE: 16 BRPM | WEIGHT: 184.6 LBS | SYSTOLIC BLOOD PRESSURE: 124 MMHG | DIASTOLIC BLOOD PRESSURE: 80 MMHG | TEMPERATURE: 97.6 F | HEIGHT: 60 IN | HEART RATE: 86 BPM | BODY MASS INDEX: 36.24 KG/M2

## 2018-10-15 DIAGNOSIS — F33.0 MILD RECURRENT MAJOR DEPRESSION (H): ICD-10-CM

## 2018-10-15 DIAGNOSIS — Z71.6 TOBACCO ABUSE COUNSELING: ICD-10-CM

## 2018-10-15 DIAGNOSIS — E11.42 TYPE 2 DIABETES MELLITUS WITH DIABETIC POLYNEUROPATHY, WITH LONG-TERM CURRENT USE OF INSULIN (H): Primary | ICD-10-CM

## 2018-10-15 DIAGNOSIS — Z79.4 TYPE 2 DIABETES MELLITUS WITH DIABETIC POLYNEUROPATHY, WITH LONG-TERM CURRENT USE OF INSULIN (H): Primary | ICD-10-CM

## 2018-10-15 DIAGNOSIS — Z72.0 TOBACCO ABUSE: ICD-10-CM

## 2018-10-15 DIAGNOSIS — N39.46 MIXED INCONTINENCE URGE AND STRESS (MALE)(FEMALE): ICD-10-CM

## 2018-10-15 DIAGNOSIS — Z23 NEED FOR PROPHYLACTIC VACCINATION AND INOCULATION AGAINST INFLUENZA: ICD-10-CM

## 2018-10-15 DIAGNOSIS — E66.01 MORBID OBESITY (H): ICD-10-CM

## 2018-10-15 DIAGNOSIS — I10 BENIGN ESSENTIAL HYPERTENSION: ICD-10-CM

## 2018-10-15 DIAGNOSIS — E78.5 HYPERLIPIDEMIA LDL GOAL <100: ICD-10-CM

## 2018-10-15 DIAGNOSIS — F41.9 ANXIETY: ICD-10-CM

## 2018-10-15 LAB
ANION GAP SERPL CALCULATED.3IONS-SCNC: 7 MMOL/L (ref 3–14)
BUN SERPL-MCNC: 20 MG/DL (ref 7–30)
CALCIUM SERPL-MCNC: 9.2 MG/DL (ref 8.5–10.1)
CHLORIDE SERPL-SCNC: 104 MMOL/L (ref 94–109)
CHOLEST SERPL-MCNC: 141 MG/DL
CO2 SERPL-SCNC: 27 MMOL/L (ref 20–32)
CREAT SERPL-MCNC: 0.7 MG/DL (ref 0.52–1.04)
EST. AVERAGE GLUCOSE BLD GHB EST-MCNC: 134 MG/DL
GFR SERPL CREATININE-BSD FRML MDRD: 89 ML/MIN/1.7M2
GLUCOSE SERPL-MCNC: 154 MG/DL (ref 70–99)
HBA1C MFR BLD: 6.3 % (ref 0–5.6)
HDLC SERPL-MCNC: 63 MG/DL
LDLC SERPL CALC-MCNC: 55 MG/DL
NONHDLC SERPL-MCNC: 78 MG/DL
POTASSIUM SERPL-SCNC: 3.9 MMOL/L (ref 3.4–5.3)
SODIUM SERPL-SCNC: 138 MMOL/L (ref 133–144)
TRIGL SERPL-MCNC: 115 MG/DL
TSH SERPL DL<=0.005 MIU/L-ACNC: 2.39 MU/L (ref 0.4–4)

## 2018-10-15 PROCEDURE — 90686 IIV4 VACC NO PRSV 0.5 ML IM: CPT | Performed by: NURSE PRACTITIONER

## 2018-10-15 PROCEDURE — 40000788 ZZHCL STATISTIC ESTIMATED AVERAGE GLUCOSE: Mod: ZL | Performed by: NURSE PRACTITIONER

## 2018-10-15 PROCEDURE — G0463 HOSPITAL OUTPT CLINIC VISIT: HCPCS

## 2018-10-15 PROCEDURE — 80061 LIPID PANEL: CPT | Mod: ZL | Performed by: NURSE PRACTITIONER

## 2018-10-15 PROCEDURE — 36415 COLL VENOUS BLD VENIPUNCTURE: CPT | Mod: ZL | Performed by: NURSE PRACTITIONER

## 2018-10-15 PROCEDURE — 83036 HEMOGLOBIN GLYCOSYLATED A1C: CPT | Mod: ZL | Performed by: NURSE PRACTITIONER

## 2018-10-15 PROCEDURE — 90471 IMMUNIZATION ADMIN: CPT | Performed by: NURSE PRACTITIONER

## 2018-10-15 PROCEDURE — 99214 OFFICE O/P EST MOD 30 MIN: CPT | Performed by: NURSE PRACTITIONER

## 2018-10-15 PROCEDURE — 80048 BASIC METABOLIC PNL TOTAL CA: CPT | Mod: ZL | Performed by: NURSE PRACTITIONER

## 2018-10-15 PROCEDURE — 84443 ASSAY THYROID STIM HORMONE: CPT | Mod: ZL | Performed by: NURSE PRACTITIONER

## 2018-10-15 PROCEDURE — G0463 HOSPITAL OUTPT CLINIC VISIT: HCPCS | Mod: 25

## 2018-10-15 RX ORDER — IBUPROFEN 800 MG/1
800 TABLET, FILM COATED ORAL EVERY 8 HOURS PRN
Qty: 90 TABLET | Refills: 1 | Status: SHIPPED | OUTPATIENT
Start: 2018-10-15 | End: 2018-10-26

## 2018-10-15 ASSESSMENT — ANXIETY QUESTIONNAIRES
GAD7 TOTAL SCORE: 14
7. FEELING AFRAID AS IF SOMETHING AWFUL MIGHT HAPPEN: MORE THAN HALF THE DAYS
IF YOU CHECKED OFF ANY PROBLEMS ON THIS QUESTIONNAIRE, HOW DIFFICULT HAVE THESE PROBLEMS MADE IT FOR YOU TO DO YOUR WORK, TAKE CARE OF THINGS AT HOME, OR GET ALONG WITH OTHER PEOPLE: SOMEWHAT DIFFICULT
5. BEING SO RESTLESS THAT IT IS HARD TO SIT STILL: MORE THAN HALF THE DAYS
4. TROUBLE RELAXING: MORE THAN HALF THE DAYS
3. WORRYING TOO MUCH ABOUT DIFFERENT THINGS: MORE THAN HALF THE DAYS
2. NOT BEING ABLE TO STOP OR CONTROL WORRYING: MORE THAN HALF THE DAYS
1. FEELING NERVOUS, ANXIOUS, OR ON EDGE: MORE THAN HALF THE DAYS
6. BECOMING EASILY ANNOYED OR IRRITABLE: MORE THAN HALF THE DAYS

## 2018-10-15 ASSESSMENT — PAIN SCALES - GENERAL: PAINLEVEL: SEVERE PAIN (7)

## 2018-10-15 NOTE — NURSING NOTE
Chief Complaint   Patient presents with     Hypertension     Diabetes     Lipids     Anxiety     Nicotine Dependence       Initial /80 (BP Location: Left arm, Patient Position: Chair, Cuff Size: Adult Large)  Pulse 86  Temp 97.6  F (36.4  C) (Tympanic)  Resp 16  Ht 5' (1.524 m)  Wt 184 lb 9.6 oz (83.7 kg)  SpO2 97%  BMI 36.05 kg/m2 Estimated body mass index is 36.05 kg/(m^2) as calculated from the following:    Height as of this encounter: 5' (1.524 m).    Weight as of this encounter: 184 lb 9.6 oz (83.7 kg).  Medication Reconciliation: complete    Pamela M. Lechevalier, LPN

## 2018-10-15 NOTE — MR AVS SNAPSHOT
After Visit Summary   10/15/2018    Lili Nava    MRN: [id number]           Patient Information     Date Of Birth          1970        Visit Information        Provider Department      10/15/2018 10:15 AM Daja Obrien NP [facility]        Today's Diagnoses     Type 2 diabetes mellitus with diabetic polyneuropathy, with long-term current use of insulin (H)    -  1    Tobacco abuse        Tobacco abuse counseling        Morbid obesity (H)        Hyperlipidemia LDL goal <100        Benign essential hypertension        Mild recurrent major depression (H)        Mixed incontinence urge and stress (male)(female)        Anxiety          Care Instructions        ASSESSMENT/PLAN:       1. Tobacco abuse  Cessation encouraged  - Tobacco Cessation - Order to Satisfy Health Maintenance    2. Tobacco abuse counseling    3. Type 2 diabetes mellitus with diabetic polyneuropathy, with long-term current use of insulin (H)  chronic  - HEMOGLOBIN A1C  - STATIN NOT PRESCRIBED, INTENTIONAL,; Please choose reason not prescribed, below    4. Morbid obesity (H)  Weight loss encouraged    5. Hyperlipidemia LDL goal <100  chronic  - LIPID PANEL  - STATIN NOT PRESCRIBED, INTENTIONAL,; Please choose reason not prescribed, below    6. Benign essential hypertension  chronic  - TSH with free T4 reflex  - BASIC METABOLIC PANEL    7. Mild recurrent major depression (H)  continue current plan  Counseling - continue    8. Mixed incontinence urge and stress (male)(female)  Continue current plan    9. Anxiety  Continue current plan    Flu vaccine - will update today      FUTURE APPOINTMENTS:       - Follow-up visit in 3 months or as needed for acute concerns.     Daja Obrien NP  [facility]        HOW TO QUIT SMOKING  Smoking is one of the hardest habits to break. About half of all those who have ever smoked have been able to quit, and most of those (about  70%) who still smoke want to quit. Here are some of the best ways to stop smoking.     KEEP TRYING:  It takes most smokers about 8 tries before they are finally able to fully quit. So, the more often you try and fail, the better your chance of quitting the next time! So, don't give up!    GO COLD TURKEY:  Most ex-smokers quit cold turkey. Trying to cut back gradually doesn't seem to work as well, perhaps because it continues the smoking habit. Also, it is possible to fool yourself by inhaling more while smoking fewer cigarettes. This results in the same amount of nicotine in your body!    GET SUPPORT:  Support programs can make an important difference, especially for the heavy smoker. These groups offer lectures, methods to change your behavior and peer support. Call the free national Quitline for more information. 800-QUIT-NOW (564-538-9148). Low-cost or free programs are offered by many hospitals, local chapters of the American Lung Association (223-938-4543) and the American Cancer Society (406-052-6955). Support at home is important too. Non-smokers can help by offering praise and encouragement. If the smoker fails to quit, encourage them to try again!    OVER-THE-COUNTER MEDICINES:  For those who can't quit on their own, Nicotine Replacement Therapy (NRT) may make quitting much easier. Certain aids such as the nicotine patch, gum and lozenge are available without a prescription. However, it is best to use these under the guidance of your doctor. The skin patch provides a steady supply of nicotine to the body. Nicotine gum and lozenge gives temporary bursts of low levels of nicotine. Both methods take the edge off the craving for cigarettes. WARNING: If you feel symptoms of nicotine overdose, such as nausea, vomiting, dizziness, weakness, or fast heartbeat, stop using these and see your doctor.    PRESCRIPTION MEDICINES:  After evaluating your smoking patterns and prior attempts at quitting, your doctor may  offer a prescription medicine such as bupropion (Zyban, Wellbutrin), varenicline (Chantix, Champix), a niocotine inhaler or nasal spray. Each has its unique advantage and side effects which your doctor can review with you.    HEALTH BENEFITS OF QUITTING:  The benefits of quitting start right away and keep improving the longer you go without smokin minutes: blood pressure and pulse return to normal  8 hours: oxygen levels return to normal  2 days: ability to smell and taste begins to improve as damaged nerves start to regrow  2-3 weeks: circulation and lung function improves  1-9 months: decreased cough, congestion and shortness of breath; less tired  1 year: risk of heart attack decreases by half  5 years: risk of lung cancer decreases by half; risk of stroke becomes the same as a non-smoker  For information about how to quit smoking, visit the following links:  National Cancer [place] ,   Clearing the Air, Quit Smoking Today   - an online booklet. http://www.smokefree.gov/pubs/clearing_the_air.pdf  Smokefree.gov http://smokefree.gov/  QuitNet http://www.quitnet.com/    5854-3513 [facility], [address] [place] [address]. All rights reserved. This information is not intended as a substitute for professional medical care. Always follow your healthcare professional's instructions.    The Benefits of Living Smoke Free  What do you want to gain from quitting? Check off some reasons to quit.  Health Benefits  ___ Reduce my risk of lung cancer, heart disease, chronic lung disease  ___ Have fewer wrinkles and softer skin  ___ Improve my sense of taste and smell  ___ For pregnant women--reduce the risk of having a miscarriage, stillbirth, premature birth, or low-birth-weight baby  Personal Benefits  ___ Feel more in control of my life  ___ Have better-smelling hair, breath, clothes, home, and car  ___ Save time by not having to take smoke breaks, buy cigarettes, or hunt for a light  ___ Have whiter  teeth  Family Benefits  ___ Reduce my children s respiratory tract infections  ___ Set a good example for my children  ___ Reduce my family s cancer risk  Financial Benefits  ___ Save hundreds of dollars each year that would be spent on cigarettes  ___ Save money on medical bills  ___ Save on life, health, and car insurance premiums    Those Dollars Add Up!  Cigarettes are expensive, and getting more expensive all the time. Do you realize how much money you are spending on cigarettes per year? What is the average amount you spend on a pack of cigarettes? What is the average number of packs that you smoke per day? Using your answers to these questions, fill in this formula to help you find out:  ($ _____ per pack) ×  ( _____ number of packs per day) × (365 days) =  $ _____ yearly cost of smoking  Besides tobacco, there are other costs, including extra cleaning bills and replacement costs for clothing and furniture; medical expenses for smoking-related illnesses; and higher health, life, and car insurance premiums.    Cigars and Pipes Count Too!  Cigars and pipes are also dangerous. So are smokeless (chewing) tobacco and snuff. All of these products contain nicotine, a highly addictive substance that has harmful effects on your body. Quitting smoking means giving up all tobacco products.      0551-6059 [facility], [address] [place] [address], [place] [address]. All rights reserved. This information is not intended as a substitute for professional medical care. Always follow your healthcare professional's instructions.          Follow-ups after your visit        Follow-up notes from your care team     Return in about 3 months (around 1/15/2019), or if symptoms worsen or fail to improve.      Who to contact     If you have questions or need follow up information about today's clinic visit or your schedule please contact [facility] directly at [phone].  Normal or non-critical lab and imaging  results will be communicated to you by BeeBillionhart, letter or phone within 4 business days after the clinic has received the results. If you do not hear from us within 7 days, please contact the clinic through myMedScore or phone. If you have a critical or abnormal lab result, we will notify you by phone as soon as possible.  Submit refill requests through myMedScore or call your pharmacy and they will forward the refill request to us. Please allow 3 business days for your refill to be completed.          Additional Information About Your Visit        myMedScore Information     myMedScore gives you secure access to your electronic health record. If you see a primary care provider, you can also send messages to your care team and make appointments. If you have questions, please call your primary care clinic.  If you do not have a primary care provider, please call [phone] and they will assist you.        Care EveryWhere ID     This is your Care EveryWhere ID. This could be used by other organizations to access your [place] medical records  [id number]        Your Vitals Were     Pulse Temperature Respirations Height Pulse Oximetry BMI (Body Mass Index)    86 97.6  F (36.4  C) (Tympanic) 16 5' (1.524 m) 97% 36.05 kg/m2       Blood Pressure from Last 3 Encounters:   10/15/18 124/80   08/23/18 120/78   08/20/18 124/82    Weight from Last 3 Encounters:   10/15/18 184 lb 9.6 oz (83.7 kg)   08/23/18 181 lb 9.6 oz (82.4 kg)   08/20/18 177 lb (80.3 kg)              We Performed the Following     BASIC METABOLIC PANEL     HEMOGLOBIN A1C     LIPID PANEL     Tobacco Cessation - Order to Satisfy Health Maintenance     TSH with free T4 reflex          Today's Medication Changes          These changes are accurate as of 10/15/18 10:50 AM.  If you have any questions, ask your nurse or doctor.               Start taking these medicines.        Dose/Directions    STATIN NOT PRESCRIBED (INTENTIONAL)   Used for:  Hyperlipidemia LDL goal <100,  Type 2 diabetes mellitus with diabetic polyneuropathy, with long-term current use of insulin (H)   Started by:  Daja Obrien NP        Please choose reason not prescribed, below   Refills:  0            Where to get your medicines      Some of these will need a paper prescription and others can be bought over the counter.  Ask your nurse if you have questions.     You don't need a prescription for these medications     STATIN NOT PRESCRIBED (INTENTIONAL)                Primary Care Provider Office Phone # Fax #    Daja Obrien [phone] [facility] [address]        Equal Access to Services     [facility]: Hadii angeline paz hadasho Soomaali, waaxda luqadaha, qaybta kaalmada adeegyada, soraida duffy . So [facility] [phone].    ATENCIÓN: Si habla español, tiene a mullins disposición servicios gratuitos de asistencia lingüística. Llame al [phone].    We comply with applicable federal civil rights laws and [place] laws. We do not discriminate on the basis of race, color, national origin, age, disability, sex, sexual orientation, or gender identity.            Thank you!     Thank you for choosing [facility]  for your care. Our goal is always to provide you with excellent care. Hearing back from our patients is one way we can continue to improve our services. Please take a few minutes to complete the written survey that you may receive in the mail after your visit with us. Thank you!             Your Updated Medication List - Protect others around you: Learn how to safely use, store and throw away your medicines at www.disposemymeds.org.          This list is accurate as of 10/15/18 10:50 AM.  Always use your most recent med list.                   Brand Name Dispense Instructions for use Diagnosis    amLODIPine 5 MG tablet    NORVASC    90 tablet    Take 1 tablet (5 mg) by mouth daily     Benign essential hypertension       aspirin 81 MG EC tablet     90 tablet    Take 1 tablet (81 mg) by mouth daily    Type 2 diabetes mellitus without complication, with long-term current use of insulin (H), Benign essential hypertension       BASAGLAR 100 UNIT/ML injection     15 mL    Inject 50 Units Subcutaneous 2 times daily    Type 2 diabetes mellitus without complication, with long-term current use of insulin (H)       blood glucose lancets standard    no brand specified    100 each    Use to test blood sugar once times daily or as directed.    Type 2 diabetes mellitus without complication, without long-term current use of insulin (H)       blood glucose monitoring meter device kit     1 kit    Use to test blood sugars 2 times daily or as directed.    Type 2 diabetes mellitus without complication, without long-term current use of insulin (H)       blood glucose monitoring test strip    no brand specified    100 each    Use to test blood sugars 2 times daily or as directed    Type 2 diabetes mellitus without complication, without long-term current use of insulin (H)       escitalopram 20 MG tablet    LEXAPRO    30 tablet    Take 1 tablet (20 mg) by mouth daily    Anxiety       estradiol 10 MCG Tabs vaginal tablet    VAGIFEM    8 tablet    Place 1 tablet (10 mcg) vaginally twice a week    Vaginal dryness, menopausal       exenatide ER 2 MG recon vial kit susp for weekly inj    BYDUREON    4 kit    Inject 2 mg Subcutaneous once a week    Type 2 diabetes mellitus without complication, with long-term current use of insulin (H)       fluconazole 150 MG tablet    DIFLUCAN    4 tablet    Take one tablet once a week    Recurrent candidiasis of vagina       gabapentin 300 MG capsule    NEURONTIN    90 capsule    Take 1 tablet (300 mg) every night for 1-3 days, then 1 tablet twice daily for 1-3 days, then 1 tablet three times daily    Anxiety, Diabetic polyneuropathy associated with type 2 diabetes mellitus (H), Chronic  bilateral low back pain with sciatica, sciatica laterality unspecified       hydrOXYzine 25 MG tablet    ATARAX    60 tablet    Take 1-2 tablets (25-50 mg) by mouth every 6 hours as needed for anxiety (or insomnia)    Anxiety       ibuprofen 800 MG tablet    ADVIL/MOTRIN    90 tablet    Take 1 tablet (800 mg) by mouth every 8 hours as needed for moderate pain    Tension headache       insulin pen needle 31G X 6 MM     100 each    Use 1 daily or as directed.    Type 2 diabetes mellitus without complication, without long-term current use of insulin (H)       lidocaine 5 % ointment    XYLOCAINE    750 g    Apply topically as needed for moderate pain    Chronic bilateral low back pain, with sciatica presence unspecified       losartan 100 MG tablet    COZAAR    30 tablet    TAKE 1 TABLET BY MOUTH DAILY    Benign essential hypertension       metFORMIN 500 MG 24 hr tablet    GLUCOPHAGE-XR    90 tablet    Take 1 tablet (500 mg) by mouth daily (with dinner)    Type 2 diabetes mellitus without complication, without long-term current use of insulin (H)       metoprolol succinate 100 MG 24 hr tablet    TOPROL-XL    90 tablet    Take 1 tablet (100 mg) by mouth daily    Benign essential hypertension       order for DME     1 each    Blood pressure monitor    Benign essential hypertension       oxybutynin 10 MG 24 hr tablet    DITROPAN-XL    30 tablet    TAKE 1 TABLET BY MOUTH DAILY    Mixed incontinence urge and stress (male)(female)       pioglitazone 30 MG tablet    ACTOS    90 tablet    Take 1 tablet (30 mg) by mouth daily    Type 2 diabetes mellitus with diabetic polyneuropathy, with long-term current use of insulin (H)       potassium chloride SA 20 MEQ CR tablet    K-DUR/KLOR-CON M    30 tablet    TAKE 1 TABLET BY MOUTH EVERY DAY    Hypokalemia       STATIN NOT PRESCRIBED (INTENTIONAL)      Please choose reason not prescribed, below    Hyperlipidemia LDL goal <100, Type 2 diabetes mellitus with diabetic polyneuropathy,  with long-term current use of insulin (H)       vitamin D 45320 UNIT capsule    ERGOCALCIFEROL    36 capsule    Take one capsule by mouth every Monday, Wednesday and Friday    Vitamin D deficiency

## 2018-10-15 NOTE — TELEPHONE ENCOUNTER
Message from [employer]:  Original authorizing provider: BAKARI Brewster would like a refill of the following medications:  ibuprofen (ADVIL/MOTRIN) 800 MG tablet [Daja Obrien NP]    Preferred pharmacy: [id number] - [address]    Comment:

## 2018-10-16 ASSESSMENT — ANXIETY QUESTIONNAIRES: GAD7 TOTAL SCORE: 14

## 2018-10-16 ASSESSMENT — PATIENT HEALTH QUESTIONNAIRE - PHQ9: SUM OF ALL RESPONSES TO PHQ QUESTIONS 1-9: 18

## 2018-10-26 ENCOUNTER — MYC REFILL (OUTPATIENT)
Dept: FAMILY MEDICINE | Facility: OTHER | Age: 48
End: 2018-10-26

## 2018-10-26 DIAGNOSIS — G44.209 TENSION HEADACHE: ICD-10-CM

## 2018-10-26 DIAGNOSIS — N95.1 VAGINAL DRYNESS, MENOPAUSAL: ICD-10-CM

## 2018-10-26 RX ORDER — IBUPROFEN 800 MG/1
800 TABLET, FILM COATED ORAL EVERY 8 HOURS PRN
Qty: 90 TABLET | Refills: 1 | Status: SHIPPED | OUTPATIENT
Start: 2018-10-26 | End: 2019-04-05

## 2018-10-26 RX ORDER — ESTRADIOL 10 UG/1
10 INSERT VAGINAL
Qty: 8 TABLET | Refills: 1 | Status: SHIPPED | OUTPATIENT
Start: 2018-10-29 | End: 2019-01-14

## 2018-11-20 DIAGNOSIS — E78.5 HYPERLIPIDEMIA LDL GOAL <100: Primary | ICD-10-CM

## 2018-11-20 DIAGNOSIS — E11.9 TYPE 2 DIABETES MELLITUS WITHOUT COMPLICATION (H): ICD-10-CM

## 2018-11-20 DIAGNOSIS — N39.46 MIXED INCONTINENCE URGE AND STRESS (MALE)(FEMALE): ICD-10-CM

## 2018-11-20 RX ORDER — OXYBUTYNIN CHLORIDE 10 MG/1
TABLET, EXTENDED RELEASE ORAL
Qty: 30 TABLET | Refills: 3 | Status: SHIPPED | OUTPATIENT
Start: 2018-11-20 | End: 2019-03-21

## 2018-11-20 RX ORDER — SIMVASTATIN 10 MG
TABLET ORAL
Qty: 90 TABLET | Refills: 1 | Status: SHIPPED | OUTPATIENT
Start: 2018-11-20 | End: 2019-05-25

## 2018-12-19 DIAGNOSIS — E11.42 TYPE 2 DIABETES MELLITUS WITH DIABETIC POLYNEUROPATHY, WITH LONG-TERM CURRENT USE OF INSULIN (H): ICD-10-CM

## 2018-12-19 DIAGNOSIS — E11.9 TYPE 2 DIABETES MELLITUS WITHOUT COMPLICATION, WITHOUT LONG-TERM CURRENT USE OF INSULIN (H): ICD-10-CM

## 2018-12-19 DIAGNOSIS — Z79.4 TYPE 2 DIABETES MELLITUS WITH DIABETIC POLYNEUROPATHY, WITH LONG-TERM CURRENT USE OF INSULIN (H): ICD-10-CM

## 2018-12-19 DIAGNOSIS — I10 BENIGN ESSENTIAL HYPERTENSION: ICD-10-CM

## 2018-12-20 RX ORDER — LOSARTAN POTASSIUM 100 MG/1
TABLET ORAL
Qty: 30 TABLET | Refills: 5 | Status: SHIPPED | OUTPATIENT
Start: 2018-12-20 | End: 2019-06-24

## 2018-12-20 RX ORDER — PIOGLITAZONEHYDROCHLORIDE 30 MG/1
TABLET ORAL
Qty: 90 TABLET | Refills: 0 | Status: SHIPPED | OUTPATIENT
Start: 2018-12-20 | End: 2019-03-21

## 2018-12-20 RX ORDER — METFORMIN HCL 500 MG
TABLET, EXTENDED RELEASE 24 HR ORAL
Qty: 90 TABLET | Refills: 0 | Status: SHIPPED | OUTPATIENT
Start: 2018-12-20 | End: 2019-03-21

## 2018-12-20 RX ORDER — AMLODIPINE BESYLATE 5 MG/1
TABLET ORAL
Qty: 90 TABLET | Refills: 1 | Status: SHIPPED | OUTPATIENT
Start: 2018-12-20 | End: 2019-06-24

## 2019-01-09 ENCOUNTER — TRANSFERRED RECORDS (OUTPATIENT)
Dept: HEALTH INFORMATION MANAGEMENT | Facility: CLINIC | Age: 49
End: 2019-01-09

## 2019-01-15 DIAGNOSIS — E11.9 TYPE 2 DIABETES MELLITUS WITHOUT COMPLICATION, WITHOUT LONG-TERM CURRENT USE OF INSULIN (H): ICD-10-CM

## 2019-01-21 ENCOUNTER — MEDICAL CORRESPONDENCE (OUTPATIENT)
Dept: HEALTH INFORMATION MANAGEMENT | Facility: CLINIC | Age: 49
End: 2019-01-21

## 2019-01-22 ENCOUNTER — TELEPHONE (OUTPATIENT)
Dept: FAMILY MEDICINE | Facility: OTHER | Age: 49
End: 2019-01-22

## 2019-01-22 DIAGNOSIS — E11.9 TYPE 2 DIABETES MELLITUS WITHOUT COMPLICATION, WITHOUT LONG-TERM CURRENT USE OF INSULIN (H): ICD-10-CM

## 2019-01-22 NOTE — PROGRESS NOTES
SUBJECTIVE:                                                    Lili Nava is a 48 year old female who presents to clinic today for the following health issues:      Diabetes Follow-up    Patient is checking blood sugars: twice daily.    Blood sugar testing frequency justification: On insulin, frequency appropriate   Results are as follows:         am - higher (185)         bedtime - higher (190)    Diabetic concerns: other - slow healing     Symptoms of hypoglycemia (low blood sugar): none     Paresthesias (numbness or burning in feet) or sores: Yes sometimes     Date of last diabetic eye exam: 2 weeks ago    Diabetes Management Resources    Hyperlipidemia Follow-Up      Rate your low fat/cholesterol diet?: good    Taking statin?  Yes, possible muscle aches from statin    Other lipid medications/supplements?:  none    Hypertension Follow-up      Outpatient blood pressures are not being checked.    Low Salt Diet: no added salt    BP Readings from Last 2 Encounters:   01/25/19 126/84   10/15/18 124/80     Hemoglobin A1C (%)   Date Value   10/15/2018 6.3 (H)   07/09/2018 8.1 (H)     LDL Cholesterol Calculated (mg/dL)   Date Value   10/15/2018 55   07/09/2018 84     Depression and Anxiety Follow-Up    Status since last visit: No change    Other associated symptoms:None    Complicating factors:     Significant life event: No     Current substance abuse: None    PHQ 8/9/2018 10/15/2018 1/25/2019   PHQ-9 Total Score 9 18 11   Q9: Suicide Ideation Not at all More than half the days Not at all   F/U: Thoughts of suicide or self-harm - No -   F/U: Safety concerns - No -     [place]-7 SCORE 8/9/2018 10/15/2018 1/25/2019   Total Score - - -   Total Score 8 14 8     PHQ-9  English  PHQ-9   Any Language  [place]-7  Suicide Assessment Five-step Evaluation and Treatment (SAFE-T)            Problem list and histories reviewed & adjusted, as indicated.  Additional history: as documented    Patient Active Problem List   Diagnosis      Type 2 diabetes mellitus with diabetic polyneuropathy, with long-term current use of insulin (H)     Benign essential hypertension     Anxiety     Mild recurrent major depression (H)     Post traumatic stress disorder     Diabetic neuropathy associated with type 2 diabetes mellitus (H)     Primary insomnia     Eczema     Vitamin D deficiency     Mixed incontinence urge and stress (male)(female)     Hyperlipidemia LDL goal <100     Obesity (BMI 35.0-39.9) with comorbidity (H)     Past Surgical History:   Procedure Laterality Date     COLONOSCOPY  01/01/2013     HYSTERECTOMY TOTAL ABDOMINAL N/A 01/01/2008    ovaries remain.        Social History     Tobacco Use     Smoking status: Current Some Day Smoker     Types: Cigarettes     Smokeless tobacco: Never Used   Substance Use Topics     Alcohol use: Yes     Comment: rare     Family History   Problem Relation Age of Onset     Hypertension Mother      Depression Mother      Coronary Artery Disease Father          Current Outpatient Medications   Medication Sig Dispense Refill     amLODIPine (NORVASC) 5 MG tablet TAKE 1 TABLET (5MG) BY MOUTH DAILY 90 tablet 1     aspirin 81 MG EC tablet Take 1 tablet (81 mg) by mouth daily 90 tablet 3     BASAGLAR 100 UNIT/ML injection Inject 50 Units Subcutaneous 2 times daily 15 mL 3     blood glucose (NO BRAND SPECIFIED) lancets standard Use to test blood sugar twice daily or as directed. 100 each 11     blood glucose (NO BRAND SPECIFIED) test strip Use to test blood sugars 2 times daily or as directed 100 each 1     blood glucose monitoring (ONE TOUCH ULTRA MINI) meter device kit Use to test blood sugars 2 times daily or as directed. 1 kit 0     escitalopram (LEXAPRO) 20 MG tablet Take 1 tablet (20 mg) by mouth daily 30 tablet 5     estradiol (VAGIFEM) 10 MCG TABS vaginal tablet PLACE 1 TABLET (10 MCG) VAGINALLY TWICE A WEEK 8 tablet 3     exenatide ER (BYDUREON) 2 MG recon vial kit susp for weekly inj Inject 2 mg Subcutaneous  once a week 4 kit 11     fluconazole (DIFLUCAN) 150 MG tablet Take one tablet once a week 4 tablet 5     gabapentin (NEURONTIN) 300 MG capsule Take 1 tablet (300 mg) every night for 1-3 days, then 1 tablet twice daily for 1-3 days, then 1 tablet three times daily 90 capsule 0     hydrOXYzine (ATARAX) 25 MG tablet Take 1-2 tablets (25-50 mg) by mouth every 6 hours as needed for anxiety (or insomnia) 60 tablet 1     ibuprofen (ADVIL/MOTRIN) 800 MG tablet Take 1 tablet (800 mg) by mouth every 8 hours as needed for moderate pain 90 tablet 1     insulin pen needle (ULTICARE MINI) 31G X 6 MM miscellaneous USE 1 DAILY OR AS DIRECTED. 100 each 1     lidocaine (XYLOCAINE) 5 % ointment Apply topically as needed for moderate pain 750 g 4     losartan (COZAAR) 100 MG tablet TAKE 1 TABLET BY MOUTH DAILY 30 tablet 5     metFORMIN (GLUCOPHAGE-XR) 500 MG 24 hr tablet TAKE 1 TABLET (500MG) BY MOUTH DAILY (WITH DINNER) 90 tablet 0     metoprolol succinate (TOPROL-XL) 100 MG 24 hr tablet Take 1 tablet (100 mg) by mouth daily 90 tablet 3     order for DME Blood pressure monitor 1 each 0     oxybutynin (DITROPAN-XL) 10 MG 24 hr tablet TAKE 1 TABLET BY MOUTH DAILY 30 tablet 3     pioglitazone (ACTOS) 30 MG tablet TAKE 1 TABLET BY MOUTH ONCE DAILY 90 tablet 0     potassium chloride SA (K-DUR/KLOR-CON M) 20 MEQ CR tablet TAKE 1 TABLET BY MOUTH EVERY DAY 30 tablet 9     simvastatin (ZOCOR) 10 MG tablet TAKE 1 TABLET BY MOUTH NIGHTLY AT BEDTIME 90 tablet 1     STATIN NOT PRESCRIBED, INTENTIONAL, Please choose reason not prescribed, below       vitamin D (ERGOCALCIFEROL) 83190 UNIT capsule Take one capsule by mouth every Monday, Wednesday and Friday 36 capsule 0     Allergies   Allergen Reactions     Celexa [Citalopram] Other (See Comments)     jittery     Lisinopril Cough     Recent Labs   Lab Test 10/15/18  1100 07/09/18  0938 05/18/18  1608 11/20/17  1112   A1C 6.3* 8.1* 9.3* 9.7*   LDL 55 84 80 93   HDL 63 54 39* 49*   TRIG 115 89 170*  132   ALT  --  34 37 36   CR 0.70 0.74 0.60 0.53   GFRESTIMATED 89 84 >90 >90   GFRESTBLACK >90 >90 >90 >90   POTASSIUM 3.9 4.2 3.9 3.9   TSH 2.39 2.06 1.79 2.98      BP Readings from Last 3 Encounters:   01/25/19 126/84   10/15/18 124/80   08/23/18 120/78    Wt Readings from Last 3 Encounters:   01/25/19 88 kg (194 lb)   10/15/18 83.7 kg (184 lb 9.6 oz)   08/23/18 82.4 kg (181 lb 9.6 oz)            ROS:  Constitutional, HEENT, cardiovascular, pulmonary, gi and gu systems are negative, except as otherwise noted.    OBJECTIVE:     /84 (BP Location: Left arm, Patient Position: Sitting, Cuff Size: Adult Large)   Pulse 76   Temp 97.1  F (36.2  C) (Tympanic)   Resp 20   Ht 1.524 m (5')   Wt 88 kg (194 lb)   SpO2 96%   BMI 37.89 kg/m    Body mass index is 37.89 kg/m .  GENERAL: healthy, alert and no distress  NECK: no adenopathy, no asymmetry, masses, or scars, thyroid normal to palpation and no carotid bruits  RESP: lungs clear to auscultation - no rales, rhonchi or wheezes  CV: regular rate and rhythm, normal S1 S2, no S3 or S4, no murmur, click or rub, no peripheral edema and peripheral pulses strong  MS: no gross musculoskeletal defects noted, no edema  PSYCH: mentation appears normal, affect normal/bright  Diabetic foot exam: normal DP and PT pulses, no trophic changes or ulcerative lesions and mildly decreased sensory exam      ASSESSMENT/PLAN:       1. Type 2 diabetes mellitus with diabetic polyneuropathy, with long-term current use of insulin (H)  chronic  - HEMOGLOBIN A1C  - C FOOT EXAM  NO CHARGE    2. Hyperlipidemia LDL goal <100  chronic  - LIPID PANEL    3. Benign essential hypertension  chronic  - TSH with free T4 reflex  - BASIC METABOLIC PANEL    4. Mild recurrent major depression (H)  Continue current plan    5. Anxiety    6. Mixed incontinence urge and stress (male)(female)  Continue current plan    CeraVe tub lotions    FUTURE APPOINTMENTS:       - Follow-up visit in 3 months or as needed  for acute concerns.     Daja Obrien, NP  [facility]

## 2019-01-22 NOTE — TELEPHONE ENCOUNTER
Call from [facility] wanting to clarify diabetic supplies as they say the paperwork was not right that was sent in yesterday. Called pt to confirm use of this supply store as noted that her past supplies had gone to a pharmacy prior. Call back # [phone]. LM for pt to call back.

## 2019-01-22 NOTE — TELEPHONE ENCOUNTER
Patient called back and needs new glucose meter,test strips, and pen needles for her insulin.Sent to [facility]. Pended. Please review device pended.Thank you.

## 2019-01-23 RX ORDER — BLOOD-GLUCOSE METER
KIT MISCELLANEOUS
Qty: 1 KIT | Refills: 0 | Status: SHIPPED | OUTPATIENT
Start: 2019-01-23

## 2019-01-25 ENCOUNTER — OFFICE VISIT (OUTPATIENT)
Dept: FAMILY MEDICINE | Facility: OTHER | Age: 49
End: 2019-01-25
Attending: NURSE PRACTITIONER
Payer: COMMERCIAL

## 2019-01-25 VITALS
BODY MASS INDEX: 38.09 KG/M2 | HEIGHT: 60 IN | DIASTOLIC BLOOD PRESSURE: 84 MMHG | HEART RATE: 76 BPM | SYSTOLIC BLOOD PRESSURE: 126 MMHG | WEIGHT: 194 LBS | OXYGEN SATURATION: 96 % | TEMPERATURE: 97.1 F | RESPIRATION RATE: 20 BRPM

## 2019-01-25 DIAGNOSIS — E11.42 TYPE 2 DIABETES MELLITUS WITH DIABETIC POLYNEUROPATHY, WITH LONG-TERM CURRENT USE OF INSULIN (H): Primary | ICD-10-CM

## 2019-01-25 DIAGNOSIS — Z79.4 TYPE 2 DIABETES MELLITUS WITH DIABETIC POLYNEUROPATHY, WITH LONG-TERM CURRENT USE OF INSULIN (H): Primary | ICD-10-CM

## 2019-01-25 DIAGNOSIS — E78.5 HYPERLIPIDEMIA LDL GOAL <100: ICD-10-CM

## 2019-01-25 DIAGNOSIS — F33.0 MILD RECURRENT MAJOR DEPRESSION (H): ICD-10-CM

## 2019-01-25 DIAGNOSIS — N39.46 MIXED INCONTINENCE URGE AND STRESS (MALE)(FEMALE): ICD-10-CM

## 2019-01-25 DIAGNOSIS — F41.9 ANXIETY: ICD-10-CM

## 2019-01-25 DIAGNOSIS — I10 BENIGN ESSENTIAL HYPERTENSION: ICD-10-CM

## 2019-01-25 LAB
ALBUMIN SERPL-MCNC: 4 G/DL (ref 3.4–5)
ALP SERPL-CCNC: 79 U/L (ref 40–150)
ALT SERPL W P-5'-P-CCNC: 23 U/L (ref 0–50)
ANION GAP SERPL CALCULATED.3IONS-SCNC: 7 MMOL/L (ref 3–14)
AST SERPL W P-5'-P-CCNC: 12 U/L (ref 0–45)
BILIRUB DIRECT SERPL-MCNC: 0.1 MG/DL (ref 0–0.2)
BILIRUB SERPL-MCNC: 0.3 MG/DL (ref 0.2–1.3)
BUN SERPL-MCNC: 20 MG/DL (ref 7–30)
CALCIUM SERPL-MCNC: 8.8 MG/DL (ref 8.5–10.1)
CHLORIDE SERPL-SCNC: 106 MMOL/L (ref 94–109)
CHOLEST SERPL-MCNC: 148 MG/DL
CO2 SERPL-SCNC: 28 MMOL/L (ref 20–32)
CREAT SERPL-MCNC: 0.64 MG/DL (ref 0.52–1.04)
EST. AVERAGE GLUCOSE BLD GHB EST-MCNC: 160 MG/DL
GFR SERPL CREATININE-BSD FRML MDRD: >90 ML/MIN/{1.73_M2}
GLUCOSE SERPL-MCNC: 133 MG/DL (ref 70–99)
HBA1C MFR BLD: 7.2 % (ref 0–5.6)
HDLC SERPL-MCNC: 57 MG/DL
LDLC SERPL CALC-MCNC: 79 MG/DL
NONHDLC SERPL-MCNC: 91 MG/DL
POTASSIUM SERPL-SCNC: 4.1 MMOL/L (ref 3.4–5.3)
PROT SERPL-MCNC: 7.8 G/DL (ref 6.8–8.8)
SODIUM SERPL-SCNC: 141 MMOL/L (ref 133–144)
TRIGL SERPL-MCNC: 62 MG/DL
TSH SERPL DL<=0.005 MIU/L-ACNC: 2.15 MU/L (ref 0.4–4)

## 2019-01-25 PROCEDURE — 40000788 ZZHCL STATISTIC ESTIMATED AVERAGE GLUCOSE: Mod: ZL | Performed by: NURSE PRACTITIONER

## 2019-01-25 PROCEDURE — 80076 HEPATIC FUNCTION PANEL: CPT | Mod: ZL | Performed by: NURSE PRACTITIONER

## 2019-01-25 PROCEDURE — G0463 HOSPITAL OUTPT CLINIC VISIT: HCPCS

## 2019-01-25 PROCEDURE — 83036 HEMOGLOBIN GLYCOSYLATED A1C: CPT | Mod: ZL | Performed by: NURSE PRACTITIONER

## 2019-01-25 PROCEDURE — 80061 LIPID PANEL: CPT | Mod: ZL | Performed by: NURSE PRACTITIONER

## 2019-01-25 PROCEDURE — 99214 OFFICE O/P EST MOD 30 MIN: CPT | Performed by: NURSE PRACTITIONER

## 2019-01-25 PROCEDURE — 80048 BASIC METABOLIC PNL TOTAL CA: CPT | Mod: ZL | Performed by: NURSE PRACTITIONER

## 2019-01-25 PROCEDURE — 84443 ASSAY THYROID STIM HORMONE: CPT | Mod: ZL | Performed by: NURSE PRACTITIONER

## 2019-01-25 PROCEDURE — 36415 COLL VENOUS BLD VENIPUNCTURE: CPT | Mod: ZL | Performed by: NURSE PRACTITIONER

## 2019-01-25 ASSESSMENT — PAIN SCALES - GENERAL: PAINLEVEL: NO PAIN (0)

## 2019-01-25 ASSESSMENT — ANXIETY QUESTIONNAIRES
IF YOU CHECKED OFF ANY PROBLEMS ON THIS QUESTIONNAIRE, HOW DIFFICULT HAVE THESE PROBLEMS MADE IT FOR YOU TO DO YOUR WORK, TAKE CARE OF THINGS AT HOME, OR GET ALONG WITH OTHER PEOPLE: SOMEWHAT DIFFICULT
7. FEELING AFRAID AS IF SOMETHING AWFUL MIGHT HAPPEN: SEVERAL DAYS
6. BECOMING EASILY ANNOYED OR IRRITABLE: SEVERAL DAYS
2. NOT BEING ABLE TO STOP OR CONTROL WORRYING: SEVERAL DAYS
1. FEELING NERVOUS, ANXIOUS, OR ON EDGE: SEVERAL DAYS
GAD7 TOTAL SCORE: 8
4. TROUBLE RELAXING: MORE THAN HALF THE DAYS
3. WORRYING TOO MUCH ABOUT DIFFERENT THINGS: SEVERAL DAYS
5. BEING SO RESTLESS THAT IT IS HARD TO SIT STILL: SEVERAL DAYS

## 2019-01-25 ASSESSMENT — PATIENT HEALTH QUESTIONNAIRE - PHQ9: SUM OF ALL RESPONSES TO PHQ QUESTIONS 1-9: 11

## 2019-01-25 ASSESSMENT — MIFFLIN-ST. JEOR: SCORE: 1431.48

## 2019-01-25 NOTE — LETTER
My Depression Action Plan  Name: Lili Nava   Date of Birth 1970  Date: 1/22/2019    My doctor: Daja Obrien   My clinic: [facility]  [address]  [place] [address]  [phone]          GREEN    ZONE   Good Control    What it looks like:     Things are going generally well. You have normal up s and down s. You may even feel depressed from time to time, but bad moods usually last less than a day.   What you need to do:  1. Continue to care for yourself (see self care plan)  2. Check your depression survival kit and update it as needed  3. Follow your physician s recommendations including any medication.  4. Do not stop taking medication unless you consult with your physician first.           YELLOW         ZONE Getting Worse    What it looks like:     Depression is starting to interfere with your life.     It may be hard to get out of bed; you may be starting to isolate yourself from others.    Symptoms of depression are starting to last most all day and this has happened for several days.     You may have suicidal thoughts but they are not constant.   What you need to do:     1. Call your care team, your response to treatment will improve if you keep your care team informed of your progress. Yellow periods are signs an adjustment may need to be made.     2. Continue your self-care, even if you have to fake it!    3. Talk to someone in your support network    4. Open up your depression survival kit           RED    ZONE Medical Alert - Get Help    What it looks like:     Depression is seriously interfering with your life.     You may experience these or other symptoms: You can t get out of bed most days, can t work or engage in other necessary activities, you have trouble taking care of basic hygiene, or basic responsibilities, thoughts of suicide or death that will not go away, self-injurious behavior.     What you need to do:  1. Call  your care team and request a same-day appointment. If they are not available (weekends or after hours) call your local crisis line, emergency room or 911.            Depression Self Care Plan / Survival Kit    Self-Care for Depression  Here s the deal. Your body and mind are really not as separate as most people think.  What you do and think affects how you feel and how you feel influences what you do and think. This means if you do things that people who feel good do, it will help you feel better.  Sometimes this is all it takes.  There is also a place for medication and therapy depending on how severe your depression is, so be sure to consult with your medical provider and/ or Behavioral Health Consultant if your symptoms are worsening or not improving.     In order to better manage my stress, I will:    Exercise  Get some form of exercise, every day. This will help reduce pain and release endorphins, the  feel good  chemicals in your brain. This is almost as good as taking antidepressants!  This is not the same as joining a gym and then never going! (they count on that by the way ) It can be as simple as just going for a walk or doing some gardening, anything that will get you moving.      Hygiene   Maintain good hygiene (Get out of bed in the morning, Make your bed, Brush your teeth, Take a shower, and Get dressed like you were going to work, even if you are unemployed).  If your clothes don't fit try to get ones that do.    Diet  I will strive to eat foods that are good for me, drink plenty of water, and avoid excessive sugar, caffeine, alcohol, and other mood-altering substances.  Some foods that are helpful in depression are: complex carbohydrates, B vitamins, flaxseed, fish or fish oil, fresh fruits and vegetables.    Psychotherapy  I agree to participate in Individual Therapy (if recommended).    Medication  If prescribed medications, I agree to take them.  Missing doses can result in serious side effects.   I understand that drinking alcohol, or other illicit drug use, may cause potential side effects.  I will not stop my medication abruptly without first discussing it with my provider.    Staying Connected With Others  I will stay in touch with my friends, family members, and my primary care provider/team.    Use your imagination  Be creative.  We all have a creative side; it doesn t matter if it s oil painting, sand castles, or mud pies! This will also kick up the endorphins.    Witness Beauty  (AKA stop and smell the roses) Take a look outside, even in mid-winter. Notice colors, textures. Watch the squirrels and birds.     Service to others  Be of service to others.  There is always someone else in need.  By helping others we can  get out of ourselves  and remember the really important things.  This also provides opportunities for practicing all the other parts of the program.    Humor  Laugh and be silly!  Adjust your TV habits for less news and crime-drama and more comedy.    Control your stress  Try breathing deep, massage therapy, biofeedback, and meditation. Find time to relax each day.     My support system    Clinic Contact:  Phone number:    Contact 1:  Phone number:    Contact 2:  Phone number:    Muslim/:  Phone number:    Therapist:  Phone number:    [facility] crisis center:    Phone number:    Other community support:  Phone number:

## 2019-01-25 NOTE — NURSING NOTE
Chief Complaint   Patient presents with     Diabetes     Depression       Initial /84 (BP Location: Left arm, Patient Position: Sitting, Cuff Size: Adult Large)   Pulse 76   Temp 97.1  F (36.2  C) (Tympanic)   Resp 20   Ht 1.524 m (5')   Wt 88 kg (194 lb)   SpO2 96%   BMI 37.89 kg/m   Estimated body mass index is 37.89 kg/m  as calculated from the following:    Height as of this encounter: 1.524 m (5').    Weight as of this encounter: 88 kg (194 lb).  Medication Reconciliation: complete    Ashley A. Lechevalier, LPN

## 2019-01-25 NOTE — PATIENT INSTRUCTIONS
ASSESSMENT/PLAN:       1. Type 2 diabetes mellitus with diabetic polyneuropathy, with long-term current use of insulin (H)  chronic  - HEMOGLOBIN A1C  - C FOOT EXAM  NO CHARGE    2. Hyperlipidemia LDL goal <100  chronic  - LIPID PANEL    3. Benign essential hypertension  chronic  - TSH with free T4 reflex  - BASIC METABOLIC PANEL    4. Mild recurrent major depression (H)  Continue current plan    5. Anxiety    6. Mixed incontinence urge and stress (male)(female)  Continue current plan    CeraVe tub lotions    FUTURE APPOINTMENTS:       - Follow-up visit in 3 months or as needed for acute concerns.     Daja Obrien NP  [facility]

## 2019-01-26 ASSESSMENT — ANXIETY QUESTIONNAIRES: GAD7 TOTAL SCORE: 8

## 2019-01-31 DIAGNOSIS — E11.9 TYPE 2 DIABETES MELLITUS WITHOUT COMPLICATION, WITHOUT LONG-TERM CURRENT USE OF INSULIN (H): ICD-10-CM

## 2019-02-25 ENCOUNTER — TELEPHONE (OUTPATIENT)
Dept: FAMILY MEDICINE | Facility: OTHER | Age: 49
End: 2019-02-25

## 2019-02-25 DIAGNOSIS — E55.9 VITAMIN D DEFICIENCY: Primary | ICD-10-CM

## 2019-02-25 DIAGNOSIS — G44.209 TENSION HEADACHE: ICD-10-CM

## 2019-02-25 NOTE — TELEPHONE ENCOUNTER
[facility] faxed 2 pages of Medications on 2/12/19 & 2/19/19 Requesting return call to confirm receipt Ph. [phone]

## 2019-03-21 DIAGNOSIS — E11.42 TYPE 2 DIABETES MELLITUS WITH DIABETIC POLYNEUROPATHY, WITH LONG-TERM CURRENT USE OF INSULIN (H): ICD-10-CM

## 2019-03-21 DIAGNOSIS — N39.46 MIXED INCONTINENCE URGE AND STRESS (MALE)(FEMALE): ICD-10-CM

## 2019-03-21 DIAGNOSIS — E11.9 TYPE 2 DIABETES MELLITUS WITHOUT COMPLICATION, WITHOUT LONG-TERM CURRENT USE OF INSULIN (H): ICD-10-CM

## 2019-03-21 DIAGNOSIS — Z79.4 TYPE 2 DIABETES MELLITUS WITH DIABETIC POLYNEUROPATHY, WITH LONG-TERM CURRENT USE OF INSULIN (H): ICD-10-CM

## 2019-03-21 RX ORDER — PIOGLITAZONEHYDROCHLORIDE 30 MG/1
TABLET ORAL
Qty: 90 TABLET | Refills: 0 | Status: SHIPPED | OUTPATIENT
Start: 2019-03-21 | End: 2019-06-24

## 2019-03-21 RX ORDER — METFORMIN HCL 500 MG
TABLET, EXTENDED RELEASE 24 HR ORAL
Qty: 90 TABLET | Refills: 0 | Status: SHIPPED | OUTPATIENT
Start: 2019-03-21 | End: 2019-06-24

## 2019-03-21 RX ORDER — OXYBUTYNIN CHLORIDE 10 MG/1
TABLET, EXTENDED RELEASE ORAL
Qty: 30 TABLET | Refills: 9 | Status: SHIPPED | OUTPATIENT
Start: 2019-03-21 | End: 2020-01-30

## 2019-03-21 NOTE — TELEPHONE ENCOUNTER
Dominga called from [facility] requesting response from faxes send 3/21/, 3/11, 2/19, 2/12 and January.  Please call Dominga [phone]

## 2019-03-21 NOTE — TELEPHONE ENCOUNTER
Oxybutynin ER (Ditropan XL) 10 mg 24 hr tab      Last Written Prescription Date:  11-20-18  Last Fill Quantity: 30,   # refills: 3  Last Office Visit: 1-25-19      Pioglitazone (Actos) 30 mg tab  Last Written Prescription Date:  12-20-18  Last Fill Quantity: 90   # refills: 0  Last Office Visit: 1-25-19      Metformin (Glucophage XR) 500 mg   tab  Last Written Prescription Date: 12-20-18  Last Fill Quantity: 90,   # refills: 0  Last Office Visit: 1-25-19

## 2019-04-01 NOTE — TELEPHONE ENCOUNTER
Left message to call back to double check she has not changed pharmacy to [facility] Pamela M Lechevalier LPN

## 2019-04-04 NOTE — TELEPHONE ENCOUNTER
Talked with pt and she does not want to us rx with stay [facility] and also wanted more ibuprofen and vit d3 sent to pharmacy and was wondering about an in crease in her lexapro has appointment set for 5/1/19  Pamela M Lechevalier LPN

## 2019-04-05 RX ORDER — CHOLECALCIFEROL (VITAMIN D3) 50 MCG
1 TABLET ORAL DAILY
Qty: 90 TABLET | Refills: 2 | Status: SHIPPED | OUTPATIENT
Start: 2019-04-05 | End: 2019-08-15

## 2019-04-05 RX ORDER — IBUPROFEN 800 MG/1
800 TABLET, FILM COATED ORAL EVERY 8 HOURS PRN
Qty: 90 TABLET | Refills: 1 | Status: SHIPPED | OUTPATIENT
Start: 2019-04-05 | End: 2019-07-08

## 2019-04-05 NOTE — TELEPHONE ENCOUNTER
Talked with pt and she will  vitamin D3 and if not any different in a couple weeks or increased depression will call and I will put her on a same day if needed. Pt verbalized understanding.  Pamela M Lechevalier LPN

## 2019-04-06 DIAGNOSIS — E11.9 TYPE 2 DIABETES MELLITUS WITHOUT COMPLICATION, WITH LONG-TERM CURRENT USE OF INSULIN (H): ICD-10-CM

## 2019-04-06 DIAGNOSIS — Z79.4 TYPE 2 DIABETES MELLITUS WITHOUT COMPLICATION, WITH LONG-TERM CURRENT USE OF INSULIN (H): ICD-10-CM

## 2019-04-08 RX ORDER — INSULIN GLARGINE 100 [IU]/ML
50 INJECTION, SOLUTION SUBCUTANEOUS 2 TIMES DAILY
Qty: 15 ML | Refills: 1 | Status: SHIPPED | OUTPATIENT
Start: 2019-04-08 | End: 2020-03-30

## 2019-05-08 NOTE — PROGRESS NOTES
SUBJECTIVE:   Lili Nava is a 48 year old female who presents to clinic today for the following   health issues:      Diabetes Follow-up    Patient is checking blood sugars: three times daily.   Blood sugar testing frequency justification: Adjustment of medication(s)  Results are as follows:         am - 145         lunchtime - 145          bedtime - 145    Diabetic concerns: None     Symptoms of hypoglycemia (low blood sugar): blurred vision     Paresthesias (numbness or burning in feet) or sores: Yes tingling bilateral feet- open sore on inner thigh right side     Date of last diabetic eye exam: a few months ago- [facility]    Diabetes Management Resources    Hyperlipidemia Follow-Up      Rate your low fat/cholesterol diet?: good    Taking statin?  Yes, possible muscle aches from statin    Other lipid medications/supplements?:  None  The 10-year ASCVD risk score (Arpit FREEMAN Jr., et al., 2013) is: 1.5%    Values used to calculate the score:      Age: 48 years      Sex: Female      Is Non- : No      Diabetic: Yes      Tobacco smoker: No      Systolic Blood Pressure: 122 mmHg      Is BP treated: Yes      HDL Cholesterol: 57 mg/dL        Total Cholesterol: 148 mg/dL        Hypertension Follow-up      Outpatient blood pressures are not being checked.    Low Salt Diet: no added salt    BP Readings from Last 2 Encounters:   01/25/19 126/84   10/15/18 124/80     Hemoglobin A1C (%)   Date Value   01/25/2019 7.2 (H)   10/15/2018 6.3 (H)     LDL Cholesterol Calculated (mg/dL)   Date Value   01/25/2019 79   10/15/2018 55       Amount of exercise or physical activity: 4-5 days/week for an average of 30-45 minutes    Problems taking medications regularly: No    Medication side effects: constipation     Diet: regular (no restrictions)        Depression and Anxiety Follow-Up    Status since last visit: stable     Other associated symptoms:None    Complicating factors:     Significant life  event: No     Current substance abuse: None    PHQ 10/15/2018 2019 5/10/2019   PHQ-9 Total Score 18 11 9   Q9: Thoughts of better off dead/self-harm past 2 weeks More than half the days Not at all Not at all   F/U: Thoughts of suicide or self-harm No - -   F/U: Safety concerns No - -     VIR[place]-7 SCORE 10/15/2018 2019 5/10/2019   Total Score - - -   Total Score 14 8 8     PHQ-9  English  PHQ-9   Any Language  VIRGINIA-7  Suicide Assessment Five-step Evaluation and Treatment (SAFE-T)    Additional history: as documented    Reviewed  and updated as needed this visit by clinical staff         Reviewed and updated as needed this visit by Provider         Patient Active Problem List   Diagnosis     Type 2 diabetes mellitus with diabetic polyneuropathy, with long-term current use of insulin (H)     Benign essential hypertension     Anxiety     Mild recurrent major depression (H)     Post traumatic stress disorder     Diabetic neuropathy associated with type 2 diabetes mellitus (H)     Primary insomnia     Eczema     Vitamin D deficiency     Mixed incontinence urge and stress (male)(female)     Hyperlipidemia LDL goal <100     Obesity (BMI 35.0-39.9) with comorbidity (H)     Past Surgical History:   Procedure Laterality Date     COLONOSCOPY  2013     HYSTERECTOMY TOTAL ABDOMINAL N/A 2008    ovaries remain.        Social History     Tobacco Use     Smoking status: Former Smoker     Types: Cigarettes     Last attempt to quit: 1988     Years since quittin.3     Smokeless tobacco: Never Used   Substance Use Topics     Alcohol use: Yes     Comment: rare     Family History   Problem Relation Age of Onset     Hypertension Mother      Depression Mother      Coronary Artery Disease Father          Current Outpatient Medications   Medication Sig Dispense Refill     amLODIPine (NORVASC) 5 MG tablet TAKE 1 TABLET (5MG) BY MOUTH DAILY 90 tablet 1     aspirin 81 MG EC tablet Take 1 tablet (81 mg) by mouth daily 90  tablet 3     blood glucose (NO BRAND SPECIFIED) lancets standard Use to test blood sugar twice daily or as directed. 100 each 11     blood glucose (NO BRAND SPECIFIED) test strip Use to test blood sugars 2 times daily or as directed 100 each 1     blood glucose monitoring (ONE TOUCH ULTRA MINI) meter device kit Use to test blood sugars 2 times daily or as directed. 1 kit 0     escitalopram (LEXAPRO) 20 MG tablet Take 1 tablet (20 mg) by mouth daily 30 tablet 5     estradiol (VAGIFEM) 10 MCG TABS vaginal tablet PLACE 1 TABLET (10 MCG) VAGINALLY TWICE A WEEK 8 tablet 3     exenatide ER (BYDUREON) 2 MG recon vial kit susp for weekly inj Inject 2 mg Subcutaneous once a week 4 kit 11     fluconazole (DIFLUCAN) 150 MG tablet Take one tablet once a week 4 tablet 5     gabapentin (NEURONTIN) 300 MG capsule Take 1 tablet (300 mg) every night for 1-3 days, then 1 tablet twice daily for 1-3 days, then 1 tablet three times daily 90 capsule 0     hydrOXYzine (ATARAX) 25 MG tablet Take 1-2 tablets (25-50 mg) by mouth every 6 hours as needed for anxiety (or insomnia) 60 tablet 1     ibuprofen (ADVIL/MOTRIN) 800 MG tablet Take 1 tablet (800 mg) by mouth every 8 hours as needed for moderate pain 90 tablet 1     insulin glargine (BASAGLAR KWIKPEN) 100 UNIT/ML pen INJECT 50 UNITS SUBCUTANEOUS 2 TIMES DAILY 15 mL 1     insulin pen needle (ULTICARE MINI) 31G X 6 MM miscellaneous USE 1 DAILY OR AS DIRECTED. 100 each 1     lidocaine (XYLOCAINE) 5 % ointment Apply topically as needed for moderate pain 750 g 4     losartan (COZAAR) 100 MG tablet TAKE 1 TABLET BY MOUTH DAILY 30 tablet 5     metFORMIN (GLUCOPHAGE-XR) 500 MG 24 hr tablet TAKE 1 TABLET (500MG) BY MOUTH DAILY (WITH DINNER) 90 tablet 0     metoprolol succinate (TOPROL-XL) 100 MG 24 hr tablet Take 1 tablet (100 mg) by mouth daily 90 tablet 3     order for DME Blood pressure monitor 1 each 0     oxybutynin ER (DITROPAN-XL) 10 MG 24 hr tablet TAKE 1 TABLET BY MOUTH DAILY 30 tablet 9      pioglitazone (ACTOS) 30 MG tablet TAKE 1 TABLET BY MOUTH ONCE DAILY 90 tablet 0     potassium chloride SA (K-DUR/KLOR-CON M) 20 MEQ CR tablet TAKE 1 TABLET BY MOUTH EVERY DAY 30 tablet 9     simvastatin (ZOCOR) 10 MG tablet TAKE 1 TABLET BY MOUTH NIGHTLY AT BEDTIME 90 tablet 1     STATIN NOT PRESCRIBED, INTENTIONAL, Please choose reason not prescribed, below       vitamin D (ERGOCALCIFEROL) 69420 UNIT capsule Take one capsule by mouth every Monday, Wednesday and Friday 36 capsule 0     vitamin D3 (CHOLECALCIFEROL) 2000 units tablet Take 1 tablet by mouth daily 90 tablet 2     Allergies   Allergen Reactions     Celexa [Citalopram] Other (See Comments)     jittery     Lisinopril Cough     Recent Labs   Lab Test 01/25/19  0858 10/15/18  1100 07/09/18  0938 05/18/18  1608   A1C 7.2* 6.3* 8.1* 9.3*   LDL 79 55 84 80   HDL 57 63 54 39*   TRIG 62 115 89 170*   ALT 23  --  34 37   CR 0.64 0.70 0.74 0.60   GFRESTIMATED >90 89 84 >90   GFRESTBLACK >90 >90 >90 >90   POTASSIUM 4.1 3.9 4.2 3.9   TSH 2.15 2.39 2.06 1.79      BP Readings from Last 3 Encounters:   05/10/19 122/82   01/25/19 126/84   10/15/18 124/80    Wt Readings from Last 3 Encounters:   05/10/19 91.6 kg (202 lb)   01/25/19 88 kg (194 lb)   10/15/18 83.7 kg (184 lb 9.6 oz)               ROS:  Constitutional, HEENT, cardiovascular, pulmonary, gi and gu systems are negative, except as otherwise noted.    OBJECTIVE:     /82   Pulse 94   Temp 97  F (36.1  C) (Tympanic)   Wt 91.6 kg (202 lb)   SpO2 97%   BMI 39.45 kg/m    Body mass index is 39.45 kg/m .  GENERAL: healthy, alert and no distress  NECK: no adenopathy, no asymmetry, masses, or scars, thyroid normal to palpation and no carotid bruits  RESP: lungs clear to auscultation - no rales, rhonchi or wheezes  CV: regular rate and rhythm, normal S1 S2, no S3 or S4, no murmur, click or rub, no peripheral edema and peripheral pulses strong  MS: no gross musculoskeletal defects noted, no edema  SKIN:  erythematous, scaly patches on medial right thigh.    PSYCH: mentation appears normal, affect normal/bright      ASSESSMENT/PLAN:       1. Type 2 diabetes mellitus with diabetic polyneuropathy, with long-term current use of insulin (H)  chronic  - Hemoglobin A1c  - Albumin Random Urine Quantitative with Creat Ratio    2. Hyperlipidemia LDL goal <100  chronic  - Lipid Profile    3. Benign essential hypertension  chronic  - Comprehensive metabolic panel  - TSH with free T4 reflex    4. Mild recurrent major depression (H)  Continue current plan    5. Anxiety  As above    6. On statin therapy  - Comprehensive metabolic panel    7. Eczema, unspecified type  - clobetasol (TEMOVATE) 0.05 % external cream; Apply topically 2 times daily  Dispense: 80 g; Refill: 1    8. Chronic idiopathic constipation  Increase fiber - consider supplement  Increase water intake  Start miralax at bedtime.      9. Obesity (BMI 35.0-39.9) with comorbidity (H)  - DIABETES EDUCATION REFERRAL ([place])        FUTURE APPOINTMENTS:       - Follow-up visit in 3 months or as needed for acute concerns     Daja Obrien NP  [facility]

## 2019-05-10 ENCOUNTER — OFFICE VISIT (OUTPATIENT)
Dept: FAMILY MEDICINE | Facility: OTHER | Age: 49
End: 2019-05-10
Attending: NURSE PRACTITIONER
Payer: COMMERCIAL

## 2019-05-10 VITALS
TEMPERATURE: 97 F | BODY MASS INDEX: 39.45 KG/M2 | DIASTOLIC BLOOD PRESSURE: 82 MMHG | HEART RATE: 94 BPM | SYSTOLIC BLOOD PRESSURE: 122 MMHG | WEIGHT: 202 LBS | OXYGEN SATURATION: 97 %

## 2019-05-10 DIAGNOSIS — E66.01 MORBID OBESITY (H): ICD-10-CM

## 2019-05-10 DIAGNOSIS — F41.9 ANXIETY: ICD-10-CM

## 2019-05-10 DIAGNOSIS — L30.9 ECZEMA, UNSPECIFIED TYPE: ICD-10-CM

## 2019-05-10 DIAGNOSIS — Z79.4 TYPE 2 DIABETES MELLITUS WITH DIABETIC POLYNEUROPATHY, WITH LONG-TERM CURRENT USE OF INSULIN (H): Primary | ICD-10-CM

## 2019-05-10 DIAGNOSIS — F33.0 MILD RECURRENT MAJOR DEPRESSION (H): ICD-10-CM

## 2019-05-10 DIAGNOSIS — E78.5 HYPERLIPIDEMIA LDL GOAL <100: ICD-10-CM

## 2019-05-10 DIAGNOSIS — I10 BENIGN ESSENTIAL HYPERTENSION: ICD-10-CM

## 2019-05-10 DIAGNOSIS — E11.42 TYPE 2 DIABETES MELLITUS WITH DIABETIC POLYNEUROPATHY, WITH LONG-TERM CURRENT USE OF INSULIN (H): Primary | ICD-10-CM

## 2019-05-10 DIAGNOSIS — K59.04 CHRONIC IDIOPATHIC CONSTIPATION: ICD-10-CM

## 2019-05-10 DIAGNOSIS — Z79.899 ON STATIN THERAPY: ICD-10-CM

## 2019-05-10 LAB
ALBUMIN SERPL-MCNC: 4 G/DL (ref 3.4–5)
ALP SERPL-CCNC: 84 U/L (ref 40–150)
ALT SERPL W P-5'-P-CCNC: 26 U/L (ref 0–50)
ANION GAP SERPL CALCULATED.3IONS-SCNC: 8 MMOL/L (ref 3–14)
AST SERPL W P-5'-P-CCNC: 16 U/L (ref 0–45)
BILIRUB SERPL-MCNC: 0.4 MG/DL (ref 0.2–1.3)
BUN SERPL-MCNC: 17 MG/DL (ref 7–30)
CALCIUM SERPL-MCNC: 8.9 MG/DL (ref 8.5–10.1)
CHLORIDE SERPL-SCNC: 105 MMOL/L (ref 94–109)
CHOLEST SERPL-MCNC: 149 MG/DL
CO2 SERPL-SCNC: 25 MMOL/L (ref 20–32)
CREAT SERPL-MCNC: 0.64 MG/DL (ref 0.52–1.04)
CREAT UR-MCNC: 92 MG/DL
EST. AVERAGE GLUCOSE BLD GHB EST-MCNC: 151 MG/DL
GFR SERPL CREATININE-BSD FRML MDRD: >90 ML/MIN/{1.73_M2}
GLUCOSE SERPL-MCNC: 104 MG/DL (ref 70–99)
HBA1C MFR BLD: 6.9 % (ref 0–5.6)
HDLC SERPL-MCNC: 58 MG/DL
LDLC SERPL CALC-MCNC: 74 MG/DL
MICROALBUMIN UR-MCNC: 16 MG/L
MICROALBUMIN/CREAT UR: 17.88 MG/G CR (ref 0–25)
NONHDLC SERPL-MCNC: 91 MG/DL
POTASSIUM SERPL-SCNC: 4 MMOL/L (ref 3.4–5.3)
PROT SERPL-MCNC: 8.3 G/DL (ref 6.8–8.8)
SODIUM SERPL-SCNC: 138 MMOL/L (ref 133–144)
TRIGL SERPL-MCNC: 83 MG/DL
TSH SERPL DL<=0.005 MIU/L-ACNC: 2.82 MU/L (ref 0.4–4)

## 2019-05-10 PROCEDURE — 80061 LIPID PANEL: CPT | Mod: ZL | Performed by: NURSE PRACTITIONER

## 2019-05-10 PROCEDURE — 80053 COMPREHEN METABOLIC PANEL: CPT | Mod: ZL | Performed by: NURSE PRACTITIONER

## 2019-05-10 PROCEDURE — 82043 UR ALBUMIN QUANTITATIVE: CPT | Mod: ZL | Performed by: NURSE PRACTITIONER

## 2019-05-10 PROCEDURE — 36415 COLL VENOUS BLD VENIPUNCTURE: CPT | Mod: ZL | Performed by: NURSE PRACTITIONER

## 2019-05-10 PROCEDURE — 84443 ASSAY THYROID STIM HORMONE: CPT | Mod: ZL | Performed by: NURSE PRACTITIONER

## 2019-05-10 PROCEDURE — 99214 OFFICE O/P EST MOD 30 MIN: CPT | Performed by: NURSE PRACTITIONER

## 2019-05-10 PROCEDURE — 83036 HEMOGLOBIN GLYCOSYLATED A1C: CPT | Mod: ZL | Performed by: NURSE PRACTITIONER

## 2019-05-10 PROCEDURE — 40000788 ZZHCL STATISTIC ESTIMATED AVERAGE GLUCOSE: Mod: ZL | Performed by: NURSE PRACTITIONER

## 2019-05-10 PROCEDURE — G0463 HOSPITAL OUTPT CLINIC VISIT: HCPCS

## 2019-05-10 PROCEDURE — G0463 HOSPITAL OUTPT CLINIC VISIT: HCPCS | Mod: 25

## 2019-05-10 RX ORDER — CLOBETASOL PROPIONATE 0.5 MG/G
CREAM TOPICAL 2 TIMES DAILY
Qty: 80 G | Refills: 1 | Status: SHIPPED | OUTPATIENT
Start: 2019-05-10

## 2019-05-10 ASSESSMENT — ANXIETY QUESTIONNAIRES
4. TROUBLE RELAXING: MORE THAN HALF THE DAYS
1. FEELING NERVOUS, ANXIOUS, OR ON EDGE: SEVERAL DAYS
6. BECOMING EASILY ANNOYED OR IRRITABLE: SEVERAL DAYS
7. FEELING AFRAID AS IF SOMETHING AWFUL MIGHT HAPPEN: SEVERAL DAYS
GAD7 TOTAL SCORE: 8
5. BEING SO RESTLESS THAT IT IS HARD TO SIT STILL: SEVERAL DAYS
2. NOT BEING ABLE TO STOP OR CONTROL WORRYING: SEVERAL DAYS
3. WORRYING TOO MUCH ABOUT DIFFERENT THINGS: SEVERAL DAYS

## 2019-05-10 ASSESSMENT — PATIENT HEALTH QUESTIONNAIRE - PHQ9: SUM OF ALL RESPONSES TO PHQ QUESTIONS 1-9: 9

## 2019-05-10 ASSESSMENT — PAIN SCALES - GENERAL: PAINLEVEL: MODERATE PAIN (4)

## 2019-05-10 NOTE — PATIENT INSTRUCTIONS
ASSESSMENT/PLAN:       1. Type 2 diabetes mellitus with diabetic polyneuropathy, with long-term current use of insulin (H)  chronic  - Hemoglobin A1c  - Albumin Random Urine Quantitative with Creat Ratio    2. Hyperlipidemia LDL goal <100  chronic  - Lipid Profile    3. Benign essential hypertension  chronic  - Comprehensive metabolic panel  - TSH with free T4 reflex    4. Mild recurrent major depression (H)  Continue current plan    5. Anxiety  As above    6. On statin therapy  - Comprehensive metabolic panel    7. Eczema, unspecified type  - clobetasol (TEMOVATE) 0.05 % external cream; Apply topically 2 times daily  Dispense: 80 g; Refill: 1    8. Chronic idiopathic constipation  Increase fiber - consider supplement  Increase water intake  Start miralax at bedtime.      9. Obesity (BMI 35.0-39.9) with comorbidity (H)  - DIABETES EDUCATION REFERRAL ([place])        FUTURE APPOINTMENTS:       - Follow-up visit in 3 months or as needed for acute concerns     Daja Obrien, NP  [facility]

## 2019-05-10 NOTE — NURSING NOTE
Chief Complaint   Patient presents with     Diabetes     Hypertension     Lipids       Initial /82   Pulse 94   Temp 97  F (36.1  C) (Tympanic)   Wt 91.6 kg (202 lb)   SpO2 97%   BMI 39.45 kg/m   Estimated body mass index is 39.45 kg/m  as calculated from the following:    Height as of 1/25/19: 1.524 m (5').    Weight as of this encounter: 91.6 kg (202 lb).  Medication Reconciliation: complete    Yin Ryan LPN

## 2019-05-11 ASSESSMENT — ANXIETY QUESTIONNAIRES: GAD7 TOTAL SCORE: 8

## 2019-05-25 DIAGNOSIS — I10 BENIGN ESSENTIAL HYPERTENSION: ICD-10-CM

## 2019-05-25 DIAGNOSIS — E78.5 HYPERLIPIDEMIA LDL GOAL <100: ICD-10-CM

## 2019-05-28 RX ORDER — METOPROLOL SUCCINATE 100 MG/1
TABLET, EXTENDED RELEASE ORAL
Qty: 90 TABLET | Refills: 3 | Status: SHIPPED | OUTPATIENT
Start: 2019-05-28 | End: 2020-04-13

## 2019-05-28 RX ORDER — SIMVASTATIN 10 MG
TABLET ORAL
Qty: 90 TABLET | Refills: 3 | Status: SHIPPED | OUTPATIENT
Start: 2019-05-28 | End: 2020-04-13

## 2019-08-01 ENCOUNTER — TELEPHONE (OUTPATIENT)
Dept: FAMILY MEDICINE | Facility: OTHER | Age: 49
End: 2019-08-01

## 2019-08-06 NOTE — PROGRESS NOTES
Subjective     Lili Nava is a 48 year old female who presents to clinic today for the following health issues:    HPI   Diabetes Follow-up      How often are you checking your blood sugar? Two times daily    What time of day are you checking your blood sugars (select all that apply)?  Before meals    Have you had any blood sugars above 200?  Yes 1-2    Have you had any blood sugars below 70?  No    What symptoms do you notice when your blood sugar is low?  Other: nausea and tired    What concerns do you have today about your diabetes? None     Do you have any of these symptoms? (Select all that apply)  Numbness in feet, Excessive thirst, Blurry vision and Weight gain     Have you had a diabetic eye exam in the last 12 months? Yes- Date of last eye exam: 1/9/19    Diabetes Management Resources    Hyperlipidemia Follow-Up      Are you having any of the following symptoms? (Select all that apply)  No complaints of shortness of breath, chest pain or pressure.  No increased sweating or nausea with activity.  No left-sided neck or arm pain.  No complaints of pain in calves when walking 1-2 blocks.    Are you regularly taking any medication or supplement to lower your cholesterol?   Yes- zocor 10 mg    Are you having muscle aches or other side effects that you think could be caused by your cholesterol lowering medication?  No   The 10-year ASCVD risk score (Manson LYDIA Jr., et al., 2013) is: 1.5%    Values used to calculate the score:      Age: 48 years      Sex: Female      Is Non- : No      Diabetic: Yes      Tobacco smoker: No      Systolic Blood Pressure: 122 mmHg      Is BP treated: Yes      HDL Cholesterol: 58 mg/dL      Total Cholesterol: 149 mg/dL        Hypertension Follow-up      Do you check your blood pressure regularly outside of the clinic? Yes     Are you following a low salt diet? Yes    Are your blood pressures ever more than 140 on the top number (systolic) OR more   than 90 on  the bottom number (diastolic), for example 140/90? No    BP Readings from Last 2 Encounters:   19 122/88   05/10/19 122/82     Hemoglobin A1C (%)   Date Value   05/10/2019 6.9 (H)   2019 7.2 (H)     LDL Cholesterol Calculated (mg/dL)   Date Value   05/10/2019 74   2019 79     Depression and Anxiety Follow-Up    How are you doing with your depression since your last visit? improved    How are you doing with your anxiety since your last visit?  improved    Are you having other symptoms that might be associated with depression or anxiety? No    Have you had a significant life event? OTHER: job change      Do you have any concerns with your use of alcohol or other drugs? No    Social History     Tobacco Use     Smoking status: Former Smoker     Types: Cigarettes     Last attempt to quit: 1988     Years since quittin.6     Smokeless tobacco: Never Used   Substance Use Topics     Alcohol use: Yes     Comment: rare     Drug use: No     PHQ 2019 5/10/2019 2019   PHQ-9 Total Score 11 9 10   Q9: Thoughts of better off dead/self-harm past 2 weeks Not at all Not at all Not at all   F/U: Thoughts of suicide or self-harm - - -   F/U: Safety concerns - - -     VIR[place]-7 SCORE 2019 5/10/2019 2019   Total Score - - -   Total Score 8 8 8       Suicide Assessment Five-step Evaluation and Treatment (SAFE-T)    Amount of exercise or physical activity: 6-7 days/week for an average of 45-60 minutes    Problems taking medications regularly: No    Medication side effects: none    Diet: diabetic      Concern - right breast issue   Onset: for a month     Description:   Rash with bumps     Intensity: mild    Progression of Symptoms:  same    Accompanying Signs & Symptoms:  Burning once in awhile slight pain     Previous history of similar problem:   no    Precipitating factors:   Worsened by: unknown     Alleviating factors:  Improved by: nothing     Therapies Tried and outcome: nothing     Breasts  are large and pendulous.  She wears a 44 DD; she is interested in breast reduction due to size and chronic back pain.      Patient Active Problem List   Diagnosis     Type 2 diabetes mellitus with diabetic polyneuropathy, with long-term current use of insulin (H)     Benign essential hypertension     Anxiety     Mild recurrent major depression (H)     Post traumatic stress disorder     Diabetic neuropathy associated with type 2 diabetes mellitus (H)     Primary insomnia     Eczema     Vitamin D deficiency     Mixed incontinence urge and stress (male)(female)     Hyperlipidemia LDL goal <100     Obesity (BMI 35.0-39.9) with comorbidity (H)     Past Surgical History:   Procedure Laterality Date     COLONOSCOPY  2013     HYSTERECTOMY TOTAL ABDOMINAL N/A 2008    ovaries remain.        Social History     Tobacco Use     Smoking status: Former Smoker     Types: Cigarettes     Last attempt to quit: 1988     Years since quittin.6     Smokeless tobacco: Never Used   Substance Use Topics     Alcohol use: Yes     Comment: rare     Family History   Problem Relation Age of Onset     Hypertension Mother      Depression Mother      Coronary Artery Disease Father          Current Outpatient Medications   Medication Sig Dispense Refill     amLODIPine (NORVASC) 5 MG tablet TAKE 1 TABLET BY MOUTH DAILY 90 tablet 0     aspirin 81 MG EC tablet Take 1 tablet (81 mg) by mouth daily 90 tablet 3     blood glucose (NO BRAND SPECIFIED) lancets standard Use to test blood sugar twice daily or as directed. 100 each 11     blood glucose (NO BRAND SPECIFIED) test strip Use to test blood sugars 2 times daily or as directed 100 each 1     blood glucose monitoring (ONE TOUCH ULTRA MINI) meter device kit Use to test blood sugars 2 times daily or as directed. 1 kit 0     cephALEXin (KEFLEX) 500 MG capsule Take 1 capsule (500 mg) by mouth 2 times daily for 10 days 20 capsule 0     clobetasol (TEMOVATE) 0.05 % external cream Apply  topically 2 times daily 80 g 1     escitalopram (LEXAPRO) 20 MG tablet Take 1 tablet (20 mg) by mouth daily 30 tablet 5     estradiol (VAGIFEM) 10 MCG TABS vaginal tablet PLACE 1 TABLET (10 MCG) VAGINALLY TWICE A WEEK 8 tablet 3     exenatide ER (BYDUREON) 2 MG recon vial kit susp for weekly inj Inject 2 mg Subcutaneous once a week 4 kit 11     fluconazole (DIFLUCAN) 150 MG tablet Take one tablet once a week 4 tablet 5     gabapentin (NEURONTIN) 300 MG capsule Take 1 tablet (300 mg) every night for 1-3 days, then 1 tablet twice daily for 1-3 days, then 1 tablet three times daily 90 capsule 0     hydrOXYzine (ATARAX) 25 MG tablet Take 1-2 tablets (25-50 mg) by mouth every 6 hours as needed for anxiety (or insomnia) 60 tablet 1     ibuprofen (ADVIL/MOTRIN) 800 MG tablet Take 1 tablet (800 mg) by mouth every 8 hours as needed for moderate pain 90 tablet 1     insulin glargine (BASAGLAR KWIKPEN) 100 UNIT/ML pen INJECT 50 UNITS SUBCUTANEOUS 2 TIMES DAILY 15 mL 1     insulin pen needle (ULTICARE MINI) 31G X 6 MM miscellaneous USE 1 DAILY OR AS DIRECTED. 100 each 1     lidocaine (XYLOCAINE) 5 % ointment Apply topically as needed for moderate pain 750 g 4     losartan (COZAAR) 100 MG tablet TAKE 1 TABLET BY MOUTH DAILY 30 tablet 3     metFORMIN (GLUCOPHAGE-XR) 500 MG 24 hr tablet TAKE 1 TABLET (500MG) BY MOUTH DAILY (WITH DINNER) 90 tablet 0     metoprolol succinate ER (TOPROL-XL) 100 MG 24 hr tablet TAKE 1 TABLET (100MG) BY MOUTH DAILY 90 tablet 3     mupirocin (BACTROBAN) 2 % external cream Apply topically 3 times daily for 10 days 30 g 1     order for DME Blood pressure monitor 1 each 0     oxybutynin ER (DITROPAN-XL) 10 MG 24 hr tablet TAKE 1 TABLET BY MOUTH DAILY 30 tablet 9     pioglitazone (ACTOS) 30 MG tablet TAKE 1 TABLET BY MOUTH ONCE DAILY 90 tablet 0     potassium chloride ER (K-DUR/KLOR-CON M) 20 MEQ CR tablet TAKE 1 TABLET BY MOUTH EVERY DAY 30 tablet 3     simvastatin (ZOCOR) 10 MG tablet TAKE 1 TABLET BY  MOUTH NIGHTLY AT BEDTIME 90 tablet 3     STATIN NOT PRESCRIBED, INTENTIONAL, Please choose reason not prescribed, below       vitamin D (ERGOCALCIFEROL) 55299 UNIT capsule Take one capsule by mouth every Monday, Wednesday and Friday 36 capsule 0     vitamin D3 (CHOLECALCIFEROL) 2000 units tablet Take 1 tablet by mouth daily 90 tablet 2     Allergies   Allergen Reactions     Celexa [Citalopram] Other (See Comments)     jittery     Lisinopril Cough     Recent Labs   Lab Test 05/10/19  1427 01/25/19  0858 10/15/18  1100 07/09/18  0938   A1C 6.9* 7.2* 6.3* 8.1*   LDL 74 79 55 84   HDL 58 57 63 54   TRIG 83 62 115 89   ALT 26 23  --  34   CR 0.64 0.64 0.70 0.74   GFRESTIMATED >90 >90 89 84   GFRESTBLACK >90 >90 >90 >90   POTASSIUM 4.0 4.1 3.9 4.2   TSH 2.82 2.15 2.39 2.06      BP Readings from Last 3 Encounters:   08/12/19 122/88   05/10/19 122/82   01/25/19 126/84    Wt Readings from Last 3 Encounters:   08/12/19 91.8 kg (202 lb 4.8 oz)   05/10/19 91.6 kg (202 lb)   01/25/19 88 kg (194 lb)                  Reviewed and updated as needed this visit by Provider  Allergies         Review of Systems   ROS COMP: Constitutional, HEENT, cardiovascular, pulmonary, gi and gu systems are negative, except as otherwise noted.      Objective    /88 (BP Location: Left arm, Patient Position: Chair, Cuff Size: Adult Large)   Pulse 81   Resp 18   Ht 1.524 m (5')   Wt 91.8 kg (202 lb 4.8 oz)   SpO2 96%   BMI 39.51 kg/m    Body mass index is 39.51 kg/m .  Physical Exam   GENERAL: healthy, alert and no distress  NECK: no adenopathy, no asymmetry, masses, or scars, thyroid normal to palpation and no carotid bruits  RESP: lungs clear to auscultation - no rales, rhonchi or wheezes  BREAST: right breast - one round lesion that measures 4mm, surrounding skin is erythematous and warm, lesions is firm and tender with palpation.  No drainage noted.    CV: regular rate and rhythm, normal S1 S2, no S3 or S4, no murmur, click or rub, no  peripheral edema and peripheral pulses strong  ABDOMEN: soft, nontender, no hepatosplenomegaly, no masses and bowel sounds normal  MS: no gross musculoskeletal defects noted, no edema  PSYCH: mentation appears normal, affect normal/bright          Assessment & Plan     1. Type 2 diabetes mellitus with diabetic polyneuropathy, with long-term current use of insulin (H)  - Hemoglobin A1c    2. Hyperlipidemia LDL goal <100  - Lipid Profile    3. Mild recurrent major depression (H)    4. Post traumatic stress disorder    5. Mixed incontinence urge and stress (male)(female)  Continue oxybutynin.      6. Benign essential hypertension  - Comprehensive metabolic panel  - TSH with free T4 reflex    7. On statin therapy  - Comprehensive metabolic panel    8. Breast cancer screening  - MA Screen Bilateral w/Cesar; Future    9. Infected cyst of skin  - mupirocin (BACTROBAN) 2 % external cream; Apply topically 3 times daily for 10 days  Dispense: 30 g; Refill: 1  - cephALEXin (KEFLEX) 500 MG capsule; Take 1 capsule (500 mg) by mouth 2 times daily for 10 days  Dispense: 20 capsule; Refill: 0     BMI:   Estimated body mass index is 39.51 kg/m  as calculated from the following:    Height as of this encounter: 1.524 m (5').    Weight as of this encounter: 91.8 kg (202 lb 4.8 oz).   Weight management plan: Discussed healthy diet and exercise guidelines        Return in about 3 months (around 11/12/2019) for diabetes, lipids, HTN.    Daja Obrien NP  [facility]

## 2019-08-12 ENCOUNTER — OFFICE VISIT (OUTPATIENT)
Dept: FAMILY MEDICINE | Facility: OTHER | Age: 49
End: 2019-08-12
Attending: NURSE PRACTITIONER
Payer: COMMERCIAL

## 2019-08-12 VITALS
WEIGHT: 202.3 LBS | SYSTOLIC BLOOD PRESSURE: 122 MMHG | OXYGEN SATURATION: 96 % | BODY MASS INDEX: 39.72 KG/M2 | HEART RATE: 81 BPM | HEIGHT: 60 IN | RESPIRATION RATE: 18 BRPM | DIASTOLIC BLOOD PRESSURE: 88 MMHG

## 2019-08-12 DIAGNOSIS — Z12.39 BREAST CANCER SCREENING: ICD-10-CM

## 2019-08-12 DIAGNOSIS — L72.9 INFECTED CYST OF SKIN: ICD-10-CM

## 2019-08-12 DIAGNOSIS — E78.5 HYPERLIPIDEMIA LDL GOAL <100: ICD-10-CM

## 2019-08-12 DIAGNOSIS — Z79.4 TYPE 2 DIABETES MELLITUS WITH DIABETIC POLYNEUROPATHY, WITH LONG-TERM CURRENT USE OF INSULIN (H): Primary | ICD-10-CM

## 2019-08-12 DIAGNOSIS — E11.42 TYPE 2 DIABETES MELLITUS WITH DIABETIC POLYNEUROPATHY, WITH LONG-TERM CURRENT USE OF INSULIN (H): Primary | ICD-10-CM

## 2019-08-12 DIAGNOSIS — N39.46 MIXED INCONTINENCE URGE AND STRESS (MALE)(FEMALE): ICD-10-CM

## 2019-08-12 DIAGNOSIS — Z79.899 ON STATIN THERAPY: ICD-10-CM

## 2019-08-12 DIAGNOSIS — L08.9 INFECTED CYST OF SKIN: ICD-10-CM

## 2019-08-12 DIAGNOSIS — F43.10 POST TRAUMATIC STRESS DISORDER: ICD-10-CM

## 2019-08-12 DIAGNOSIS — I10 BENIGN ESSENTIAL HYPERTENSION: ICD-10-CM

## 2019-08-12 DIAGNOSIS — F33.0 MILD RECURRENT MAJOR DEPRESSION (H): ICD-10-CM

## 2019-08-12 LAB
ALBUMIN SERPL-MCNC: 4 G/DL (ref 3.4–5)
ALP SERPL-CCNC: 84 U/L (ref 40–150)
ALT SERPL W P-5'-P-CCNC: 29 U/L (ref 0–50)
ANION GAP SERPL CALCULATED.3IONS-SCNC: 7 MMOL/L (ref 3–14)
AST SERPL W P-5'-P-CCNC: 17 U/L (ref 0–45)
BILIRUB SERPL-MCNC: 0.5 MG/DL (ref 0.2–1.3)
BUN SERPL-MCNC: 17 MG/DL (ref 7–30)
CALCIUM SERPL-MCNC: 9.2 MG/DL (ref 8.5–10.1)
CHLORIDE SERPL-SCNC: 104 MMOL/L (ref 94–109)
CHOLEST SERPL-MCNC: 139 MG/DL
CO2 SERPL-SCNC: 27 MMOL/L (ref 20–32)
CREAT SERPL-MCNC: 0.6 MG/DL (ref 0.52–1.04)
EST. AVERAGE GLUCOSE BLD GHB EST-MCNC: 154 MG/DL
GFR SERPL CREATININE-BSD FRML MDRD: >90 ML/MIN/{1.73_M2}
GLUCOSE SERPL-MCNC: 178 MG/DL (ref 70–99)
HBA1C MFR BLD: 7 % (ref 0–5.6)
HDLC SERPL-MCNC: 57 MG/DL
LDLC SERPL CALC-MCNC: 67 MG/DL
NONHDLC SERPL-MCNC: 82 MG/DL
POTASSIUM SERPL-SCNC: 3.9 MMOL/L (ref 3.4–5.3)
PROT SERPL-MCNC: 8.2 G/DL (ref 6.8–8.8)
SODIUM SERPL-SCNC: 138 MMOL/L (ref 133–144)
TRIGL SERPL-MCNC: 77 MG/DL
TSH SERPL DL<=0.005 MIU/L-ACNC: 1.62 MU/L (ref 0.4–4)

## 2019-08-12 PROCEDURE — 84443 ASSAY THYROID STIM HORMONE: CPT | Mod: ZL | Performed by: NURSE PRACTITIONER

## 2019-08-12 PROCEDURE — 36415 COLL VENOUS BLD VENIPUNCTURE: CPT | Mod: ZL | Performed by: NURSE PRACTITIONER

## 2019-08-12 PROCEDURE — 83036 HEMOGLOBIN GLYCOSYLATED A1C: CPT | Mod: ZL | Performed by: NURSE PRACTITIONER

## 2019-08-12 PROCEDURE — 80061 LIPID PANEL: CPT | Mod: ZL | Performed by: NURSE PRACTITIONER

## 2019-08-12 PROCEDURE — 99214 OFFICE O/P EST MOD 30 MIN: CPT | Performed by: NURSE PRACTITIONER

## 2019-08-12 PROCEDURE — 40000788 ZZHCL STATISTIC ESTIMATED AVERAGE GLUCOSE: Mod: ZL | Performed by: NURSE PRACTITIONER

## 2019-08-12 PROCEDURE — 80053 COMPREHEN METABOLIC PANEL: CPT | Mod: ZL | Performed by: NURSE PRACTITIONER

## 2019-08-12 PROCEDURE — G0463 HOSPITAL OUTPT CLINIC VISIT: HCPCS

## 2019-08-12 RX ORDER — CEPHALEXIN 500 MG/1
500 CAPSULE ORAL 2 TIMES DAILY
Qty: 20 CAPSULE | Refills: 0 | Status: SHIPPED | OUTPATIENT
Start: 2019-08-12 | End: 2019-11-26

## 2019-08-12 RX ORDER — MUPIROCIN CALCIUM 20 MG/G
CREAM TOPICAL 3 TIMES DAILY
Qty: 30 G | Refills: 1 | Status: SHIPPED | OUTPATIENT
Start: 2019-08-12 | End: 2019-08-22

## 2019-08-12 ASSESSMENT — ANXIETY QUESTIONNAIRES
5. BEING SO RESTLESS THAT IT IS HARD TO SIT STILL: SEVERAL DAYS
GAD7 TOTAL SCORE: 8
7. FEELING AFRAID AS IF SOMETHING AWFUL MIGHT HAPPEN: SEVERAL DAYS
3. WORRYING TOO MUCH ABOUT DIFFERENT THINGS: SEVERAL DAYS
6. BECOMING EASILY ANNOYED OR IRRITABLE: SEVERAL DAYS
4. TROUBLE RELAXING: MORE THAN HALF THE DAYS
2. NOT BEING ABLE TO STOP OR CONTROL WORRYING: SEVERAL DAYS
1. FEELING NERVOUS, ANXIOUS, OR ON EDGE: SEVERAL DAYS
IF YOU CHECKED OFF ANY PROBLEMS ON THIS QUESTIONNAIRE, HOW DIFFICULT HAVE THESE PROBLEMS MADE IT FOR YOU TO DO YOUR WORK, TAKE CARE OF THINGS AT HOME, OR GET ALONG WITH OTHER PEOPLE: SOMEWHAT DIFFICULT

## 2019-08-12 ASSESSMENT — PAIN SCALES - GENERAL: PAINLEVEL: NO PAIN (0)

## 2019-08-12 ASSESSMENT — MIFFLIN-ST. JEOR: SCORE: 1469.13

## 2019-08-12 ASSESSMENT — PATIENT HEALTH QUESTIONNAIRE - PHQ9: SUM OF ALL RESPONSES TO PHQ QUESTIONS 1-9: 10

## 2019-08-12 NOTE — PATIENT INSTRUCTIONS
Assessment & Plan     1. Type 2 diabetes mellitus with diabetic polyneuropathy, with long-term current use of insulin (H)  - Hemoglobin A1c    2. Hyperlipidemia LDL goal <100  - Lipid Profile    3. Mild recurrent major depression (H)    4. Post traumatic stress disorder    5. Mixed incontinence urge and stress (male)(female)    6. Benign essential hypertension  - Comprehensive metabolic panel  - TSH with free T4 reflex    7. On statin therapy  - Comprehensive metabolic panel    8. Breast cancer screening  - MA Screen Bilateral w/Cesar; Future    9. Infected cyst of skin  - mupirocin (BACTROBAN) 2 % external cream; Apply topically 3 times daily for 10 days  Dispense: 30 g; Refill: 1  - cephALEXin (KEFLEX) 500 MG capsule; Take 1 capsule (500 mg) by mouth 2 times daily for 10 days  Dispense: 20 capsule; Refill: 0     BMI:   Estimated body mass index is 39.51 kg/m  as calculated from the following:    Height as of this encounter: 1.524 m (5').    Weight as of this encounter: 91.8 kg (202 lb 4.8 oz).   Weight management plan: Discussed healthy diet and exercise guidelines        Return in about 3 months (around 11/12/2019) for diabetes, lipids, HTN.    Daja Obrien NP  [facility] [place] [facility]

## 2019-08-12 NOTE — NURSING NOTE
Chief Complaint   Patient presents with     Diabetes     Hypertension     Lipids     Depression     Anxiety       Initial /88 (BP Location: Left arm, Patient Position: Chair, Cuff Size: Adult Large)   Pulse 81   Resp 18   Ht 1.524 m (5')   Wt 91.8 kg (202 lb 4.8 oz)   SpO2 96%   BMI 39.51 kg/m   Estimated body mass index is 39.51 kg/m  as calculated from the following:    Height as of this encounter: 1.524 m (5').    Weight as of this encounter: 91.8 kg (202 lb 4.8 oz).  Medication Reconciliation: complete   Pamela M Lechevalier LPN

## 2019-08-13 ASSESSMENT — ANXIETY QUESTIONNAIRES: GAD7 TOTAL SCORE: 8

## 2019-09-17 ENCOUNTER — TELEPHONE (OUTPATIENT)
Dept: FAMILY MEDICINE | Facility: OTHER | Age: 49
End: 2019-09-17

## 2019-09-17 DIAGNOSIS — E11.8 TYPE 2 DIABETES MELLITUS WITH COMPLICATION, WITH LONG-TERM CURRENT USE OF INSULIN (H): Primary | ICD-10-CM

## 2019-09-17 DIAGNOSIS — Z79.4 TYPE 2 DIABETES MELLITUS WITH COMPLICATION, WITH LONG-TERM CURRENT USE OF INSULIN (H): Primary | ICD-10-CM

## 2019-09-17 RX ORDER — EXENATIDE 2 MG/.65ML
INJECTION, SUSPENSION, EXTENDED RELEASE SUBCUTANEOUS
Qty: 4 EACH | Refills: 0 | Status: SHIPPED | OUTPATIENT
Start: 2019-09-17 | End: 2020-02-26

## 2019-09-17 NOTE — TELEPHONE ENCOUNTER
Received a PA from [employer] for Basaglar Kwikpen 100 unit/ml pen-injectors. Submitted on CMM. Waiting for a response.

## 2019-09-17 NOTE — TELEPHONE ENCOUNTER
Received an APPROVAL from [employer] for Basaglar Kwikpen 100 unit/ml pen-injectors. Effective 08/18/2019 to 12/16/2019. Forms scanned to Epic.

## 2019-11-15 ENCOUNTER — TELEPHONE (OUTPATIENT)
Dept: FAMILY MEDICINE | Facility: OTHER | Age: 49
End: 2019-11-15

## 2019-11-15 NOTE — TELEPHONE ENCOUNTER
11:02 AM    Reason for Call: Phone Call    Description: Pt states that she noticed some broken blood vessels on the top of her feet yesterday and she would like a call back regarding this.    Was an appointment offered for this call? No  If yes : Appointment type              Date    Preferred method for responding to this message: Telephone Call  What is your phone number ?[phone]    If we cannot reach you directly, may we leave a detailed response at the number you provided? Yes    Can this message wait until your PCP/provider returns, if available today? Not applicable, pcp is in     [facility]

## 2019-11-15 NOTE — TELEPHONE ENCOUNTER
Talked with pt and states burning numbness slightly red noted if worse over weekend uc or er and to call Monday if its same we will try and move her appointment up this month   Pamela M Lechevalier LPN

## 2019-11-22 NOTE — PROGRESS NOTES
Patient arrived by private vehicle. Patient cut tip of 2nd finger left hand at 7:30 at home with knife while opening package.    Subjective     Lili Nava is a 49 year old female who presents to clinic today for the following health issues:    HPI   Diabetes Follow-up    How often are you checking your blood sugar? Two times daily  Blood sugar testing frequency justification:  Uncontrolled diabetes  What time of day are you checking your blood sugars (select all that apply)?  Before and after meals  Have you had any blood sugars above 200?  Yes   Have you had any blood sugars below 70?  No    What symptoms do you notice when your blood sugar is low?  None    What concerns do you have today about your diabetes? None     Do you have any of these symptoms? (Select all that apply)  Numbness in feet, Burning in feet, Redness, sores, or blisters on feet, Excessive thirst, Blurry vision and Weight gain     Have you had a diabetic eye exam in the last 12 months? Yes- Date of last eye exam: 1/9/19     requesting referral for diabetes shoes due to numbness tingling of feet and pain at the end of her shift.      Diabetes [facility]    Hyperlipidemia Follow-Up      Are you having any of the following symptoms? (Select all that apply)  No complaints of shortness of breath, chest pain or pressure.  No increased sweating or nausea with activity.  No left-sided neck or arm pain.  No complaints of pain in calves when walking 1-2 blocks.    Are you regularly taking any medication or supplement to lower your cholesterol?   Yes- zocor 10 mg    Are you having muscle aches or other side effects that you think could be caused by your cholesterol lowering medication?  No   The 10-year ASCVD risk score (Arpit FREEMAN Jr., et al., 2013) is: 1.5%    Values used to calculate the score:      Age: 49 years      Sex: Female      Is Non- : No      Diabetic: Yes      Tobacco smoker: No      Systolic Blood Pressure: 120 mmHg      Is BP treated: Yes      HDL Cholesterol: 57 mg/dL      Total Cholesterol: 139 mg/dL        Hypertension  Follow-up      Do you check your blood pressure regularly outside of the clinic? Yes     Are you following a low salt diet? Yes    Are your blood pressures ever more than 140 on the top number (systolic) OR more   than 90 on the bottom number (diastolic), for example 140/90? No   She has noticed mild swelling in feet after being up on all day after work.      BP Readings from Last 2 Encounters:   19 120/86   19 122/88     Hemoglobin A1C (%)   Date Value   2019 7.0 (H)   05/10/2019 6.9 (H)     LDL Cholesterol Calculated (mg/dL)   Date Value   2019 67   05/10/2019 74       Depression and Anxiety Follow-Up    How are you doing with your depression since your last visit? No change    How are you doing with your anxiety since your last visit?  No change    Are you having other symptoms that might be associated with depression or anxiety? Yes:  bouts of not wanting to do anything or go anywhere    Have you had a significant life event? No     Do you have any concerns with your use of alcohol or other drugs? No    Social History     Tobacco Use     Smoking status: Former Smoker     Types: Cigarettes     Last attempt to quit: 1988     Years since quittin.9     Smokeless tobacco: Never Used   Substance Use Topics     Alcohol use: Yes     Comment: rare     Drug use: No     PHQ 5/10/2019 2019 2019   PHQ-9 Total Score 9 10 19   Q9: Thoughts of better off dead/self-harm past 2 weeks Not at all Not at all More than half the days   F/U: Thoughts of suicide or self-harm - - No   F/U: Safety concerns - - No     [place]-7 SCORE 5/10/2019 2019 2019   Total Score - - -   Total Score 8 8 14       Suicide Assessment Five-step Evaluation and Treatment (SAFE-T)      How many servings of fruits and vegetables do you eat daily?  2-3    On average, how many sweetened beverages do you drink each day (soda, juice, sweet tea, etc)?   1    How many days per week do you miss taking your  medication? 0    Referral for weight loss surgery   Breast reduction??    Patient Active Problem List   Diagnosis     Type 2 diabetes mellitus with diabetic polyneuropathy, with long-term current use of insulin (H)     Benign essential hypertension     Anxiety     Mild recurrent major depression (H)     Post traumatic stress disorder     Diabetic neuropathy associated with type 2 diabetes mellitus (H)     Primary insomnia     Eczema     Vitamin D deficiency     Mixed incontinence urge and stress (male)(female)     Hyperlipidemia LDL goal <100     Obesity (BMI 35.0-39.9) with comorbidity (H)     Past Surgical History:   Procedure Laterality Date     COLONOSCOPY  2013     HYSTERECTOMY TOTAL ABDOMINAL N/A 2008    ovaries remain.        Social History     Tobacco Use     Smoking status: Former Smoker     Types: Cigarettes     Last attempt to quit: 1988     Years since quittin.9     Smokeless tobacco: Never Used   Substance Use Topics     Alcohol use: Yes     Comment: rare     Family History   Problem Relation Age of Onset     Hypertension Mother      Depression Mother      Coronary Artery Disease Father          Current Outpatient Medications   Medication Sig Dispense Refill     amLODIPine (NORVASC) 5 MG tablet TAKE 1 TABLET BY MOUTH DAILY 90 tablet 0     aspirin 81 MG EC tablet Take 1 tablet (81 mg) by mouth daily 90 tablet 3     blood glucose (NO BRAND SPECIFIED) lancets standard Use to test blood sugar twice daily or as directed. 100 each 11     blood glucose (NO BRAND SPECIFIED) test strip Use to test blood sugars 2 times daily or as directed 100 each 1     blood glucose monitoring (ONE TOUCH ULTRA MINI) meter device kit Use to test blood sugars 2 times daily or as directed. 1 kit 0     BYDUREON 2 MG pen INJECT 2MG SUBCUTANEOUSLY ONCE WEEKLY 4 each 0     clobetasol (TEMOVATE) 0.05 % external cream Apply topically 2 times daily 80 g 1     escitalopram (LEXAPRO) 20 MG tablet Take 1 tablet (20 mg)  by mouth daily 30 tablet 5     [START ON 11/28/2019] estradiol (VAGIFEM) 10 MCG TABS vaginal tablet Place 1 tablet (10 mcg) vaginally twice a week 8 tablet 3     exenatide ER (BYDUREON) 2 MG recon vial kit susp for weekly inj Inject 2 mg Subcutaneous once a week 4 kit 11     fluconazole (DIFLUCAN) 150 MG tablet Take one tablet once a week 4 tablet 5     gabapentin (NEURONTIN) 300 MG capsule Take 1 tablet (300 mg) every night for 1-3 days, then 1 tablet twice daily for 1-3 days, then 1 tablet three times daily 90 capsule 0     hydrOXYzine (ATARAX) 25 MG tablet Take 1-2 tablets (25-50 mg) by mouth every 6 hours as needed for anxiety (or insomnia) 60 tablet 1     ibuprofen (ADVIL/MOTRIN) 800 MG tablet TAKE 1 TABLET (800 MG) BY MOUTH EVERY 8 HOURS AS NEEDED FOR MODERATEPAIN 90 tablet 0     insulin glargine (BASAGLAR KWIKPEN) 100 UNIT/ML pen INJECT 50 UNITS SUBCUTANEOUS 2 TIMES DAILY 15 mL 1     insulin pen needle (ULTICARE MINI) 31G X 6 MM miscellaneous USE 1 DAILY OR AS DIRECTED. 100 each 1     lidocaine (XYLOCAINE) 5 % ointment Apply topically as needed for moderate pain 750 g 4     losartan (COZAAR) 100 MG tablet TAKE 1 TABLET BY MOUTH DAILY 30 tablet 3     metFORMIN (GLUCOPHAGE-XR) 500 MG 24 hr tablet TAKE 1 TABLET (500MG) BY MOUTH DAILY (WITH DINNER) 90 tablet 0     metoprolol succinate ER (TOPROL-XL) 100 MG 24 hr tablet TAKE 1 TABLET (100MG) BY MOUTH DAILY 90 tablet 3     order for DME Blood pressure monitor 1 each 0     oxybutynin ER (DITROPAN-XL) 10 MG 24 hr tablet TAKE 1 TABLET BY MOUTH DAILY 30 tablet 9     pioglitazone (ACTOS) 30 MG tablet TAKE 1 TABLET BY MOUTH ONCE DAILY 90 tablet 0     potassium chloride ER (K-DUR/KLOR-CON M) 20 MEQ CR tablet TAKE 1 TABLET BY MOUTH EVERY DAY 30 tablet 3     simvastatin (ZOCOR) 10 MG tablet TAKE 1 TABLET BY MOUTH NIGHTLY AT BEDTIME 90 tablet 3     vitamin D (ERGOCALCIFEROL) 46160 UNIT capsule Take one capsule by mouth every Monday, Wednesday and Friday 36 capsule 0      vitamin D3 (CHOLECALCIFEROL) 2000 units (50 mcg) tablet Take 1 tablet (2,000 Units) by mouth daily 90 tablet 2     Allergies   Allergen Reactions     Celexa [Citalopram] Other (See Comments)     jittery     Lisinopril Cough     Recent Labs   Lab Test 08/12/19  1104 05/10/19  1427 01/25/19  0858   A1C 7.0* 6.9* 7.2*   LDL 67 74 79   HDL 57 58 57   TRIG 77 83 62   ALT 29 26 23   CR 0.60 0.64 0.64   GFRESTIMATED >90 >90 >90   GFRESTBLACK >90 >90 >90   POTASSIUM 3.9 4.0 4.1   TSH 1.62 2.82 2.15      BP Readings from Last 3 Encounters:   11/26/19 120/86   08/12/19 122/88   05/10/19 122/82    Wt Readings from Last 3 Encounters:   11/26/19 92.6 kg (204 lb 3.2 oz)   08/12/19 91.8 kg (202 lb 4.8 oz)   05/10/19 91.6 kg (202 lb)                 Reviewed and updated as needed this visit by Provider         Review of Systems   ROS COMP: Constitutional, HEENT, cardiovascular, pulmonary, gi and gu systems are negative, except as otherwise noted.      Objective    /86 (BP Location: Left arm, Patient Position: Chair, Cuff Size: Adult Large)   Pulse 111   Resp 18   Ht 1.524 m (5')   Wt 92.6 kg (204 lb 3.2 oz)   SpO2 96%   BMI 39.88 kg/m    Body mass index is 39.88 kg/m .  Physical Exam   GENERAL: alert, no distress and obese  NECK: no adenopathy, no asymmetry, masses, or scars, thyroid normal to palpation and no carotid bruits  RESP: lungs clear to auscultation - no rales, rhonchi or wheezes  CV: regular rate and rhythm, normal S1 S2, no S3 or S4, no murmur, click or rub, no peripheral edema and peripheral pulses strong  MS: no gross musculoskeletal defects noted, no edema  PSYCH: mentation appears normal, affect normal/bright            Assessment & Plan     1. Diabetic polyneuropathy associated with type 2 diabetes mellitus (H)  - BARIATRIC ADULT REFERRAL - [facility]  - Hemoglobin A1c    2. Hyperlipidemia LDL goal <100  - BARIATRIC ADULT REFERRAL  - Lipid Profile    3. Benign essential hypertension  - BARIATRIC ADULT  REFERRAL  - Comprehensive metabolic panel  - TSH with free T4 reflex    4. Post traumatic stress disorder  continue current plan    5. Mild recurrent major depression (H)  Continue current plan    6. Anxiety    7. Obesity (BMI 35.0-39.9) with comorbidity (H)  [facility]  - BARIATRIC ADULT REFERRAL    8. On statin therapy  - Comprehensive metabolic panel    9. Vaginal dryness, menopausal  - estradiol (VAGIFEM) 10 MCG TABS vaginal tablet; Place 1 tablet (10 mcg) vaginally twice a week  Dispense: 8 tablet; Refill: 3    10. Breast cancer screening  routine  - MA Screen Bilateral w/Cesar; Future    11. Venous (peripheral) insufficiency  Script for compression stockings is provided  Encouraged low sodium diet - less than 2000mg.    Increase regular walking or exercise.         BMI:   Estimated body mass index is 39.88 kg/m  as calculated from the following:    Height as of this encounter: 1.524 m (5').    Weight as of this encounter: 92.6 kg (204 lb 3.2 oz).         Return in about 3 months (around 2/26/2020) for diabetes, lipids, HTN.    Daja Obrien NP  [facility]

## 2019-11-26 ENCOUNTER — OFFICE VISIT (OUTPATIENT)
Dept: FAMILY MEDICINE | Facility: OTHER | Age: 49
End: 2019-11-26
Attending: NURSE PRACTITIONER
Payer: COMMERCIAL

## 2019-11-26 VITALS
SYSTOLIC BLOOD PRESSURE: 120 MMHG | RESPIRATION RATE: 18 BRPM | OXYGEN SATURATION: 96 % | WEIGHT: 204.2 LBS | HEIGHT: 60 IN | BODY MASS INDEX: 40.09 KG/M2 | HEART RATE: 111 BPM | DIASTOLIC BLOOD PRESSURE: 86 MMHG

## 2019-11-26 DIAGNOSIS — Z12.39 BREAST CANCER SCREENING: ICD-10-CM

## 2019-11-26 DIAGNOSIS — Z79.899 ON STATIN THERAPY: ICD-10-CM

## 2019-11-26 DIAGNOSIS — E66.01 MORBID OBESITY (H): ICD-10-CM

## 2019-11-26 DIAGNOSIS — F43.10 POST TRAUMATIC STRESS DISORDER: ICD-10-CM

## 2019-11-26 DIAGNOSIS — F41.9 ANXIETY: ICD-10-CM

## 2019-11-26 DIAGNOSIS — F33.0 MILD RECURRENT MAJOR DEPRESSION (H): ICD-10-CM

## 2019-11-26 DIAGNOSIS — N95.1 VAGINAL DRYNESS, MENOPAUSAL: ICD-10-CM

## 2019-11-26 DIAGNOSIS — G44.209 TENSION HEADACHE: ICD-10-CM

## 2019-11-26 DIAGNOSIS — E78.5 HYPERLIPIDEMIA LDL GOAL <100: ICD-10-CM

## 2019-11-26 DIAGNOSIS — I87.2 VENOUS (PERIPHERAL) INSUFFICIENCY: ICD-10-CM

## 2019-11-26 DIAGNOSIS — R79.89 LOW TSH LEVEL: ICD-10-CM

## 2019-11-26 DIAGNOSIS — E11.42 DIABETIC POLYNEUROPATHY ASSOCIATED WITH TYPE 2 DIABETES MELLITUS (H): Primary | ICD-10-CM

## 2019-11-26 DIAGNOSIS — I10 BENIGN ESSENTIAL HYPERTENSION: ICD-10-CM

## 2019-11-26 LAB
ALBUMIN SERPL-MCNC: 3.8 G/DL (ref 3.4–5)
ALP SERPL-CCNC: 93 U/L (ref 40–150)
ALT SERPL W P-5'-P-CCNC: 42 U/L (ref 0–50)
ANION GAP SERPL CALCULATED.3IONS-SCNC: 7 MMOL/L (ref 3–14)
AST SERPL W P-5'-P-CCNC: 20 U/L (ref 0–45)
BILIRUB SERPL-MCNC: 0.4 MG/DL (ref 0.2–1.3)
BUN SERPL-MCNC: 16 MG/DL (ref 7–30)
CALCIUM SERPL-MCNC: 9.6 MG/DL (ref 8.5–10.1)
CHLORIDE SERPL-SCNC: 106 MMOL/L (ref 94–109)
CHOLEST SERPL-MCNC: 134 MG/DL
CO2 SERPL-SCNC: 27 MMOL/L (ref 20–32)
CREAT SERPL-MCNC: 0.59 MG/DL (ref 0.52–1.04)
EST. AVERAGE GLUCOSE BLD GHB EST-MCNC: 169 MG/DL
GFR SERPL CREATININE-BSD FRML MDRD: >90 ML/MIN/{1.73_M2}
GLUCOSE SERPL-MCNC: 187 MG/DL (ref 70–99)
HBA1C MFR BLD: 7.5 % (ref 0–5.6)
HDLC SERPL-MCNC: 50 MG/DL
LDLC SERPL CALC-MCNC: 71 MG/DL
NONHDLC SERPL-MCNC: 84 MG/DL
POTASSIUM SERPL-SCNC: 4.1 MMOL/L (ref 3.4–5.3)
PROT SERPL-MCNC: 8.4 G/DL (ref 6.8–8.8)
SODIUM SERPL-SCNC: 140 MMOL/L (ref 133–144)
T4 FREE SERPL-MCNC: 1.89 NG/DL (ref 0.76–1.46)
TRIGL SERPL-MCNC: 66 MG/DL
TSH SERPL DL<=0.005 MIU/L-ACNC: 0.02 MU/L (ref 0.4–4)

## 2019-11-26 PROCEDURE — 40000788 ZZHCL STATISTIC ESTIMATED AVERAGE GLUCOSE: Mod: ZL | Performed by: NURSE PRACTITIONER

## 2019-11-26 PROCEDURE — 80053 COMPREHEN METABOLIC PANEL: CPT | Mod: ZL | Performed by: NURSE PRACTITIONER

## 2019-11-26 PROCEDURE — 36415 COLL VENOUS BLD VENIPUNCTURE: CPT | Mod: ZL | Performed by: NURSE PRACTITIONER

## 2019-11-26 PROCEDURE — 84443 ASSAY THYROID STIM HORMONE: CPT | Mod: ZL | Performed by: NURSE PRACTITIONER

## 2019-11-26 PROCEDURE — 99214 OFFICE O/P EST MOD 30 MIN: CPT | Performed by: NURSE PRACTITIONER

## 2019-11-26 PROCEDURE — 80061 LIPID PANEL: CPT | Mod: ZL | Performed by: NURSE PRACTITIONER

## 2019-11-26 PROCEDURE — 83036 HEMOGLOBIN GLYCOSYLATED A1C: CPT | Mod: ZL | Performed by: NURSE PRACTITIONER

## 2019-11-26 PROCEDURE — 84439 ASSAY OF FREE THYROXINE: CPT | Mod: ZL | Performed by: NURSE PRACTITIONER

## 2019-11-26 PROCEDURE — G0463 HOSPITAL OUTPT CLINIC VISIT: HCPCS

## 2019-11-26 RX ORDER — IBUPROFEN 800 MG/1
TABLET, FILM COATED ORAL
Qty: 90 TABLET | Refills: 0 | Status: SHIPPED | OUTPATIENT
Start: 2019-11-26 | End: 2020-03-18

## 2019-11-26 RX ORDER — ESTRADIOL 10 UG/1
10 INSERT VAGINAL
Qty: 8 TABLET | Refills: 3 | Status: SHIPPED | OUTPATIENT
Start: 2019-11-28

## 2019-11-26 ASSESSMENT — ANXIETY QUESTIONNAIRES
GAD7 TOTAL SCORE: 14
1. FEELING NERVOUS, ANXIOUS, OR ON EDGE: MORE THAN HALF THE DAYS
5. BEING SO RESTLESS THAT IT IS HARD TO SIT STILL: MORE THAN HALF THE DAYS
3. WORRYING TOO MUCH ABOUT DIFFERENT THINGS: MORE THAN HALF THE DAYS
7. FEELING AFRAID AS IF SOMETHING AWFUL MIGHT HAPPEN: MORE THAN HALF THE DAYS
6. BECOMING EASILY ANNOYED OR IRRITABLE: MORE THAN HALF THE DAYS
4. TROUBLE RELAXING: MORE THAN HALF THE DAYS
2. NOT BEING ABLE TO STOP OR CONTROL WORRYING: MORE THAN HALF THE DAYS
IF YOU CHECKED OFF ANY PROBLEMS ON THIS QUESTIONNAIRE, HOW DIFFICULT HAVE THESE PROBLEMS MADE IT FOR YOU TO DO YOUR WORK, TAKE CARE OF THINGS AT HOME, OR GET ALONG WITH OTHER PEOPLE: SOMEWHAT DIFFICULT

## 2019-11-26 ASSESSMENT — PATIENT HEALTH QUESTIONNAIRE - PHQ9: SUM OF ALL RESPONSES TO PHQ QUESTIONS 1-9: 19

## 2019-11-26 ASSESSMENT — MIFFLIN-ST. JEOR: SCORE: 1472.75

## 2019-11-26 ASSESSMENT — PAIN SCALES - GENERAL: PAINLEVEL: EXTREME PAIN (8)

## 2019-11-26 NOTE — NURSING NOTE
Chief Complaint   Patient presents with     Hypertension     Diabetes     Lipids     Depression     Anxiety       Initial /86 (BP Location: Left arm, Patient Position: Chair, Cuff Size: Adult Large)   Pulse 111   Resp 18   Ht 1.524 m (5')   Wt 92.6 kg (204 lb 3.2 oz)   SpO2 96%   BMI 39.88 kg/m   Estimated body mass index is 39.88 kg/m  as calculated from the following:    Height as of this encounter: 1.524 m (5').    Weight as of this encounter: 92.6 kg (204 lb 3.2 oz).  Medication Reconciliation: complete  Pamela M. Lechevalier, LPN

## 2019-11-26 NOTE — PATIENT INSTRUCTIONS
Assessment & Plan     1. Diabetic polyneuropathy associated with type 2 diabetes mellitus (H)  - BARIATRIC ADULT REFERRAL - [facility]  - Hemoglobin A1c    2. Hyperlipidemia LDL goal <100  - BARIATRIC ADULT REFERRAL  - Lipid Profile    3. Benign essential hypertension  - BARIATRIC ADULT REFERRAL  - Comprehensive metabolic panel  - TSH with free T4 reflex    4. Post traumatic stress disorder  continue current plan    5. Mild recurrent major depression (H)  Continue current plan    6. Anxiety    7. Obesity (BMI 35.0-39.9) with comorbidity (H)  [facility]  - BARIATRIC ADULT REFERRAL    8. On statin therapy  - Comprehensive metabolic panel    9. Vaginal dryness, menopausal  - estradiol (VAGIFEM) 10 MCG TABS vaginal tablet; Place 1 tablet (10 mcg) vaginally twice a week  Dispense: 8 tablet; Refill: 3    10. Breast cancer screening  routine  - MA Screen Bilateral w/Cesar; Future     BMI:   Estimated body mass index is 39.88 kg/m  as calculated from the following:    Height as of this encounter: 1.524 m (5').    Weight as of this encounter: 92.6 kg (204 lb 3.2 oz).       Return in about 3 months (around 2/26/2020) for diabetes, lipids, HTN.    Daja Obrien NP  [facility]

## 2019-11-27 ASSESSMENT — ANXIETY QUESTIONNAIRES: GAD7 TOTAL SCORE: 14

## 2019-12-09 DIAGNOSIS — Z79.4 TYPE 2 DIABETES MELLITUS WITH DIABETIC POLYNEUROPATHY, WITH LONG-TERM CURRENT USE OF INSULIN (H): Primary | ICD-10-CM

## 2019-12-09 DIAGNOSIS — E11.42 TYPE 2 DIABETES MELLITUS WITH DIABETIC POLYNEUROPATHY, WITH LONG-TERM CURRENT USE OF INSULIN (H): Primary | ICD-10-CM

## 2019-12-11 RX ORDER — PROCHLORPERAZINE 25 MG/1
1 SUPPOSITORY RECTAL
Qty: 8 EACH | Refills: 3 | Status: SHIPPED | OUTPATIENT
Start: 2019-12-11 | End: 2020-03-30

## 2019-12-11 RX ORDER — PROCHLORPERAZINE 25 MG/1
1 SUPPOSITORY RECTAL
Qty: 1 EACH | Refills: 4 | Status: SHIPPED | OUTPATIENT
Start: 2019-12-11 | End: 2020-03-30

## 2020-01-09 ENCOUNTER — TELEPHONE (OUTPATIENT)
Dept: FAMILY MEDICINE | Facility: OTHER | Age: 50
End: 2020-01-09

## 2020-01-09 NOTE — TELEPHONE ENCOUNTER
Can you please addend last office note for Decon needs injections 3 times and checking gbs 4-5 times a day   Thanks   Pamela M Lechevalier LPN

## 2020-02-20 NOTE — PROGRESS NOTES
Subjective     Lili Nava is a 49 year old female who presents to clinic today for the following health issues:    HPI   Diabetes Follow-up    How often are you checking your blood sugar? Three times daily - 145 this morning.  She has trouble inserting the dexcom sensor.  She would like to go back to diabetes ed.    Blood sugar testing frequency justification:  Uncontrolled diabetes  What time of day are you checking your blood sugars (select all that apply)?  Before and after meals  Have you had any blood sugars above 200?  No  Have you had any blood sugars below 70?  No    What symptoms do you notice when your blood sugar is low?  Other: thirsty and tired     What concerns do you have today about your diabetes? Other: thirsty      Do you have any of these symptoms? (Select all that apply)  Excessive thirst    Have you had a diabetic eye exam in the last 12 months? No     She is not drinking pop.            Hyperlipidemia Follow-Up      Are you regularly taking any medication or supplement to lower your cholesterol?   Yes- zocor 10 mg     Are you having muscle aches or other side effects that you think could be caused by your cholesterol lowering medication?  No    Hypertension Follow-up      Do you check your blood pressure regularly outside of the clinic? Yes     Are you following a low salt diet? Yes    Are your blood pressures ever more than 140 on the top number (systolic) OR more   than 90 on the bottom number (diastolic), for example 140/90? No    BP Readings from Last 2 Encounters:   02/26/20 (!) 140/110   11/26/19 120/86     Hemoglobin A1C (%)   Date Value   11/26/2019 7.5 (H)   08/12/2019 7.0 (H)     LDL Cholesterol Calculated (mg/dL)   Date Value   11/26/2019 71   08/12/2019 67       Depression and Anxiety Follow-Up    How are you doing with your depression since your last visit? Worsened     How are you doing with your anxiety since your last visit?  Worsened     Are you having other symptoms  that might be associated with depression or anxiety? Yes:  bouts of crying no wanting to go any where or do anything    Have you had a significant life event? Relationship Concerns and OTHER: concerns for daughter in treatment who will be released next week.  Lili is anxiety about her ability to remain sober - anxiety worsens to the point of increased nausea.       Do you have any concerns with your use of alcohol or other drugs? No    Social History     Tobacco Use     Smoking status: Former Smoker     Types: Cigarettes     Last attempt to quit: 1988     Years since quittin.1     Smokeless tobacco: Never Used   Substance Use Topics     Alcohol use: Yes     Comment: rare     Drug use: No     PHQ 2019   PHQ-9 Total Score 10 19 11   Q9: Thoughts of better off dead/self-harm past 2 weeks Not at all More than half the days Not at all   F/U: Thoughts of suicide or self-harm - No -   F/U: Safety concerns - No -     [place]-7 SCORE 2019   Total Score - - -   Total Score 8 14 8           Suicide Assessment Five-step Evaluation and Treatment (SAFE-T)    Hypothyroidism Follow-up      Since last visit, patient describes the following symptoms: Weight stable, no hair loss, no skin changes, no constipation, no loose stools      How many servings of fruits and vegetables do you eat daily?  2-3    On average, how many sweetened beverages do you drink each day (Examples: soda, juice, sweet tea, etc.  Do NOT count diet or artificially sweetened beverages)?   0    How many days per week do you exercise enough to make your heart beat faster? 3 or less    How many minutes a day do you exercise enough to make your heart beat faster? 9 or less  How many days per week do you miss taking your medication? 1    What makes it hard for you to take your medications?  remembering to take      Patient Active Problem List   Diagnosis     Type 2 diabetes mellitus with diabetic  polyneuropathy, with long-term current use of insulin (H)     Benign essential hypertension     Anxiety     Mild recurrent major depression (H)     Post traumatic stress disorder     Diabetic neuropathy associated with type 2 diabetes mellitus (H)     Primary insomnia     Eczema     Vitamin D deficiency     Mixed incontinence urge and stress (male)(female)     Hyperlipidemia LDL goal <100     Obesity (BMI 35.0-39.9) with comorbidity (H)     Past Surgical History:   Procedure Laterality Date     COLONOSCOPY  2013     HYSTERECTOMY TOTAL ABDOMINAL N/A 2008    ovaries remain.        Social History     Tobacco Use     Smoking status: Former Smoker     Types: Cigarettes     Last attempt to quit: 1988     Years since quittin.1     Smokeless tobacco: Never Used   Substance Use Topics     Alcohol use: Yes     Comment: rare     Family History   Problem Relation Age of Onset     Hypertension Mother      Depression Mother      Coronary Artery Disease Father          Current Outpatient Medications   Medication Sig Dispense Refill     amLODIPine (NORVASC) 10 MG tablet Take 1 tablet (10 mg) by mouth daily 90 tablet 1     aspirin 81 MG EC tablet Take 1 tablet (81 mg) by mouth daily 90 tablet 3     blood glucose (NO BRAND SPECIFIED) lancets standard Use to test blood sugar twice daily or as directed. 100 each 11     blood glucose (NO BRAND SPECIFIED) test strip Use to test blood sugars 2 times daily or as directed 100 each 1     blood glucose monitoring (ONE TOUCH ULTRA MINI) meter device kit Use to test blood sugars 2 times daily or as directed. 1 kit 0     clobetasol (TEMOVATE) 0.05 % external cream Apply topically 2 times daily 80 g 1     Continuous Blood Gluc  (DEXCOM  KIT) LUKE 1 each 4 times daily 1 Device 0     Continuous Blood Gluc Sensor (DEXCOM G6 SENSOR) MISC 1 each every 10 days 8 each 3     Continuous Blood Gluc Transmit (DEXCOM G6 TRANSMITTER) MISC 1 each every 3 months 1 each 4      escitalopram (LEXAPRO) 20 MG tablet Take 1 tablet (20 mg) by mouth daily 30 tablet 5     estradiol (VAGIFEM) 10 MCG TABS vaginal tablet Place 1 tablet (10 mcg) vaginally twice a week 8 tablet 3     exenatide ER (BYDUREON) 2 MG recon vial kit susp for weekly inj Inject 2 mg Subcutaneous once a week 4 kit 11     fluconazole (DIFLUCAN) 150 MG tablet Take 1 tablet (150 mg) by mouth daily 3 tablet 2     fluconazole (DIFLUCAN) 150 MG tablet Take one tablet once a week 4 tablet 5     gabapentin (NEURONTIN) 300 MG capsule Take 1 tablet (300 mg) every night for 1-3 days, then 1 tablet twice daily for 1-3 days, then 1 tablet three times daily 90 capsule 0     hydrOXYzine (ATARAX) 25 MG tablet Take 1-2 tablets (25-50 mg) by mouth every 6 hours as needed for anxiety (or insomnia) 60 tablet 1     ibuprofen (ADVIL/MOTRIN) 800 MG tablet TAKE 1 TABLET (800 MG) BY MOUTH EVERY 8 HOURS AS NEEDED FOR MODERATEPAIN 90 tablet 0     insulin glargine (BASAGLAR KWIKPEN) 100 UNIT/ML pen INJECT 50 UNITS SUBCUTANEOUS 2 TIMES DAILY 15 mL 1     insulin pen needle (ULTICARE MINI) 31G X 6 MM miscellaneous USE 1 DAILY OR AS DIRECTED. 100 each 1     lidocaine (XYLOCAINE) 5 % ointment Apply topically as needed for moderate pain 750 g 4     losartan (COZAAR) 100 MG tablet TAKE 1 TABLET BY MOUTH DAILY 30 tablet 3     metFORMIN (GLUCOPHAGE-XR) 500 MG 24 hr tablet TAKE 1 TABLET (500MG) BY MOUTH DAILY (WITH DINNER) 90 tablet 0     metoprolol succinate ER (TOPROL-XL) 100 MG 24 hr tablet TAKE 1 TABLET (100MG) BY MOUTH DAILY 90 tablet 3     order for [facility] Blood pressure monitor 1 each 0     oxybutynin ER (DITROPAN-XL) 10 MG 24 hr tablet TAKE 1 TABLET BY MOUTH DAILY 30 tablet 5     pioglitazone (ACTOS) 30 MG tablet TAKE 1 TABLET BY MOUTH ONCE DAILY 90 tablet 0     potassium chloride ER (K-DUR/KLOR-CON M) 20 MEQ CR tablet TAKE 1 TABLET BY MOUTH EVERY DAY 30 tablet 3     simvastatin (ZOCOR) 10 MG tablet TAKE 1 TABLET BY MOUTH NIGHTLY AT BEDTIME 90 tablet 3      vitamin D3 (CHOLECALCIFEROL) 2000 units (50 mcg) tablet Take 1 tablet (2,000 Units) by mouth daily 90 tablet 2     Allergies   Allergen Reactions     Celexa [Citalopram] Other (See Comments)     jittery     Lisinopril Cough     Recent Labs   Lab Test 11/26/19  1124 08/12/19  1104 05/10/19  1427   A1C 7.5* 7.0* 6.9*   LDL 71 67 74   HDL 50 57 58   TRIG 66 77 83   ALT 42 29 26   CR 0.59 0.60 0.64   GFRESTIMATED >90 >90 >90   GFRESTBLACK >90 >90 >90   POTASSIUM 4.1 3.9 4.0   TSH 0.02* 1.62 2.82      BP Readings from Last 3 Encounters:   02/26/20 (!) 140/110   11/26/19 120/86   08/12/19 122/88    Wt Readings from Last 3 Encounters:   02/26/20 90.4 kg (199 lb 3.2 oz)   11/26/19 92.6 kg (204 lb 3.2 oz)   08/12/19 91.8 kg (202 lb 4.8 oz)                  Reviewed and updated as needed this visit by Provider         Review of Systems   ROS COMP: Constitutional, HEENT, cardiovascular, pulmonary, gi and gu systems are negative, except as otherwise noted.      Objective    BP (!) 140/110 (BP Location: Left arm, Patient Position: Chair, Cuff Size: Adult Large)   Pulse 88   Resp 18   Ht 1.524 m (5')   Wt 90.4 kg (199 lb 3.2 oz)   SpO2 96%   BMI 38.90 kg/m    Body mass index is 38.9 kg/m .  Physical Exam   GENERAL: healthy, alert and no distress; obese  NECK: no adenopathy, no asymmetry, masses, or scars, thyroid normal to palpation and no carotid bruits  RESP: lungs clear to auscultation - no rales, rhonchi or wheezes  CV: regular rate and rhythm, normal S1 S2, no S3 or S4, no murmur, click or rub, no peripheral edema and peripheral pulses strong  MS: no gross musculoskeletal defects noted, no edema  PSYCH: mentation appears normal, affect normal/bright        Assessment & Plan     1. Type 2 diabetes mellitus with diabetic polyneuropathy, with long-term current use of insulin (H)  - Hemoglobin A1c  - DIABETES EDUCATION REFERRAL ([place])    2. Hyperlipidemia LDL goal <100  - Lipid Profile    3. Benign essential  hypertension  Continue losartan 100mg daily   - Comprehensive metabolic panel  - TSH with free T4 reflex  - amLODIPine (NORVASC) 10 MG tablet; Take 1 tablet (10 mg) by mouth daily  Dispense: 90 tablet; Refill: 1    4. Mild recurrent major depression (H)  Continue current plan    5. Mixed incontinence urge and stress (male)(female)  Continue current plan    6. Anxiety    7. Primary insomnia    8. On statin therapy  - Comprehensive metabolic panel    9. Recurrent candidiasis of vagina  - fluconazole (DIFLUCAN) 150 MG tablet; Take 1 tablet (150 mg) by mouth daily  Dispense: 3 tablet; Refill: 2    10. Encounter for screening mammogram for breast cancer  - [place] Screen Bilateral w/[place]; Future    11. Nausea  - ondansetron (ZOFRAN) 4 MG tablet; Take 1 tablet (4 mg) by mouth 3 times daily as needed for nausea  Dispense: 60 tablet; Refill: 1       BMI:   Estimated body mass index is 38.9 kg/m  as calculated from the following:    Height as of this encounter: 1.524 m (5').    Weight as of this encounter: 90.4 kg (199 lb 3.2 oz).   Weight management plan: Discussed healthy diet and exercise guidelines      Return in about 1 month (around 3/26/2020) for hypertension.    Daja Obrien NP  [facility] [place] [facility]

## 2020-02-26 ENCOUNTER — OFFICE VISIT (OUTPATIENT)
Dept: FAMILY MEDICINE | Facility: OTHER | Age: 50
End: 2020-02-26
Attending: NURSE PRACTITIONER
Payer: COMMERCIAL

## 2020-02-26 VITALS
SYSTOLIC BLOOD PRESSURE: 140 MMHG | BODY MASS INDEX: 39.11 KG/M2 | HEIGHT: 60 IN | WEIGHT: 199.2 LBS | OXYGEN SATURATION: 96 % | RESPIRATION RATE: 18 BRPM | HEART RATE: 88 BPM | DIASTOLIC BLOOD PRESSURE: 110 MMHG

## 2020-02-26 DIAGNOSIS — E78.5 HYPERLIPIDEMIA LDL GOAL <100: ICD-10-CM

## 2020-02-26 DIAGNOSIS — B37.31 RECURRENT CANDIDIASIS OF VAGINA: ICD-10-CM

## 2020-02-26 DIAGNOSIS — Z79.4 TYPE 2 DIABETES MELLITUS WITH DIABETIC POLYNEUROPATHY, WITH LONG-TERM CURRENT USE OF INSULIN (H): Primary | ICD-10-CM

## 2020-02-26 DIAGNOSIS — I10 BENIGN ESSENTIAL HYPERTENSION: ICD-10-CM

## 2020-02-26 DIAGNOSIS — F51.01 PRIMARY INSOMNIA: ICD-10-CM

## 2020-02-26 DIAGNOSIS — Z12.31 ENCOUNTER FOR SCREENING MAMMOGRAM FOR BREAST CANCER: ICD-10-CM

## 2020-02-26 DIAGNOSIS — N39.46 MIXED INCONTINENCE URGE AND STRESS (MALE)(FEMALE): ICD-10-CM

## 2020-02-26 DIAGNOSIS — F33.0 MILD RECURRENT MAJOR DEPRESSION (H): ICD-10-CM

## 2020-02-26 DIAGNOSIS — R11.0 NAUSEA: ICD-10-CM

## 2020-02-26 DIAGNOSIS — F41.9 ANXIETY: ICD-10-CM

## 2020-02-26 DIAGNOSIS — E11.42 TYPE 2 DIABETES MELLITUS WITH DIABETIC POLYNEUROPATHY, WITH LONG-TERM CURRENT USE OF INSULIN (H): Primary | ICD-10-CM

## 2020-02-26 DIAGNOSIS — Z79.899 ON STATIN THERAPY: ICD-10-CM

## 2020-02-26 LAB
ALBUMIN SERPL-MCNC: 3.7 G/DL (ref 3.4–5)
ALP SERPL-CCNC: 112 U/L (ref 40–150)
ALT SERPL W P-5'-P-CCNC: 44 U/L (ref 0–50)
ANION GAP SERPL CALCULATED.3IONS-SCNC: 5 MMOL/L (ref 3–14)
AST SERPL W P-5'-P-CCNC: 26 U/L (ref 0–45)
BILIRUB SERPL-MCNC: 0.6 MG/DL (ref 0.2–1.3)
BUN SERPL-MCNC: 14 MG/DL (ref 7–30)
CALCIUM SERPL-MCNC: 8.9 MG/DL (ref 8.5–10.1)
CHLORIDE SERPL-SCNC: 103 MMOL/L (ref 94–109)
CHOLEST SERPL-MCNC: 142 MG/DL
CO2 SERPL-SCNC: 28 MMOL/L (ref 20–32)
CREAT SERPL-MCNC: 0.7 MG/DL (ref 0.52–1.04)
EST. AVERAGE GLUCOSE BLD GHB EST-MCNC: 237 MG/DL
GFR SERPL CREATININE-BSD FRML MDRD: >90 ML/MIN/{1.73_M2}
GLUCOSE SERPL-MCNC: 247 MG/DL (ref 70–99)
HBA1C MFR BLD: 9.9 % (ref 0–5.6)
HDLC SERPL-MCNC: 49 MG/DL
LDLC SERPL CALC-MCNC: 77 MG/DL
NONHDLC SERPL-MCNC: 93 MG/DL
POTASSIUM SERPL-SCNC: 4.3 MMOL/L (ref 3.4–5.3)
PROT SERPL-MCNC: 8.2 G/DL (ref 6.8–8.8)
SODIUM SERPL-SCNC: 136 MMOL/L (ref 133–144)
TRIGL SERPL-MCNC: 78 MG/DL
TSH SERPL DL<=0.005 MIU/L-ACNC: 3.83 MU/L (ref 0.4–4)

## 2020-02-26 PROCEDURE — 99214 OFFICE O/P EST MOD 30 MIN: CPT | Performed by: NURSE PRACTITIONER

## 2020-02-26 PROCEDURE — 36415 COLL VENOUS BLD VENIPUNCTURE: CPT | Mod: ZL | Performed by: NURSE PRACTITIONER

## 2020-02-26 PROCEDURE — 80061 LIPID PANEL: CPT | Mod: ZL | Performed by: NURSE PRACTITIONER

## 2020-02-26 PROCEDURE — 40000788 ZZHCL STATISTIC ESTIMATED AVERAGE GLUCOSE: Mod: ZL | Performed by: NURSE PRACTITIONER

## 2020-02-26 PROCEDURE — 83036 HEMOGLOBIN GLYCOSYLATED A1C: CPT | Mod: ZL | Performed by: NURSE PRACTITIONER

## 2020-02-26 PROCEDURE — 84443 ASSAY THYROID STIM HORMONE: CPT | Mod: ZL | Performed by: NURSE PRACTITIONER

## 2020-02-26 PROCEDURE — G0463 HOSPITAL OUTPT CLINIC VISIT: HCPCS

## 2020-02-26 PROCEDURE — 80053 COMPREHEN METABOLIC PANEL: CPT | Mod: ZL | Performed by: NURSE PRACTITIONER

## 2020-02-26 RX ORDER — ONDANSETRON 4 MG/1
4 TABLET, FILM COATED ORAL 3 TIMES DAILY PRN
Qty: 60 TABLET | Refills: 1 | Status: SHIPPED | OUTPATIENT
Start: 2020-02-26

## 2020-02-26 RX ORDER — AMLODIPINE BESYLATE 10 MG/1
10 TABLET ORAL DAILY
Qty: 90 TABLET | Refills: 1 | Status: SHIPPED | OUTPATIENT
Start: 2020-02-26 | End: 2020-10-27

## 2020-02-26 RX ORDER — FLUCONAZOLE 150 MG/1
150 TABLET ORAL DAILY
Qty: 3 TABLET | Refills: 2 | Status: SHIPPED | OUTPATIENT
Start: 2020-02-26 | End: 2020-03-30

## 2020-02-26 ASSESSMENT — ANXIETY QUESTIONNAIRES
7. FEELING AFRAID AS IF SOMETHING AWFUL MIGHT HAPPEN: SEVERAL DAYS
3. WORRYING TOO MUCH ABOUT DIFFERENT THINGS: MORE THAN HALF THE DAYS
6. BECOMING EASILY ANNOYED OR IRRITABLE: SEVERAL DAYS
IF YOU CHECKED OFF ANY PROBLEMS ON THIS QUESTIONNAIRE, HOW DIFFICULT HAVE THESE PROBLEMS MADE IT FOR YOU TO DO YOUR WORK, TAKE CARE OF THINGS AT HOME, OR GET ALONG WITH OTHER PEOPLE: SOMEWHAT DIFFICULT
4. TROUBLE RELAXING: SEVERAL DAYS
1. FEELING NERVOUS, ANXIOUS, OR ON EDGE: SEVERAL DAYS
GAD7 TOTAL SCORE: 8
5. BEING SO RESTLESS THAT IT IS HARD TO SIT STILL: SEVERAL DAYS
2. NOT BEING ABLE TO STOP OR CONTROL WORRYING: SEVERAL DAYS

## 2020-02-26 ASSESSMENT — PATIENT HEALTH QUESTIONNAIRE - PHQ9: SUM OF ALL RESPONSES TO PHQ QUESTIONS 1-9: 11

## 2020-02-26 ASSESSMENT — PAIN SCALES - GENERAL: PAINLEVEL: MODERATE PAIN (5)

## 2020-02-26 ASSESSMENT — MIFFLIN-ST. JEOR: SCORE: 1450.07

## 2020-02-26 NOTE — PATIENT INSTRUCTIONS
Assessment & Plan     1. Type 2 diabetes mellitus with diabetic polyneuropathy, with long-term current use of insulin (H)  - Hemoglobin A1c  - DIABETES EDUCATION REFERRAL ([place])    2. Hyperlipidemia LDL goal <100  - Lipid Profile    3. Benign essential hypertension  Continue losartan 100mg daily   - Comprehensive metabolic panel  - TSH with free T4 reflex  - amLODIPine (NORVASC) 10 MG tablet; Take 1 tablet (10 mg) by mouth daily  Dispense: 90 tablet; Refill: 1    4. Mild recurrent major depression (H)  Continue current plan    5. Mixed incontinence urge and stress (male)(female)  Continue current plan    6. Anxiety    7. Primary insomnia    8. On statin therapy  - Comprehensive metabolic panel    9. Recurrent candidiasis of vagina  - fluconazole (DIFLUCAN) 150 MG tablet; Take 1 tablet (150 mg) by mouth daily  Dispense: 3 tablet; Refill: 2    10. Encounter for screening mammogram for breast cancer  - [place] Screen Bilateral w/[place]; Future    11. Nausea  - ondansetron (ZOFRAN) 4 MG tablet; Take 1 tablet (4 mg) by mouth 3 times daily as needed for nausea  Dispense: 60 tablet; Refill: 1       BMI:   Estimated body mass index is 38.9 kg/m  as calculated from the following:    Height as of this encounter: 1.524 m (5').    Weight as of this encounter: 90.4 kg (199 lb 3.2 oz).   Weight management plan: Discussed healthy diet and exercise guidelines      Return in about 1 month (around 3/26/2020) for hypertension.    Daja Obrien NP  [facility] [place] [facility]

## 2020-02-27 ASSESSMENT — ANXIETY QUESTIONNAIRES: GAD7 TOTAL SCORE: 8

## 2020-02-28 DIAGNOSIS — I10 BENIGN ESSENTIAL HYPERTENSION: ICD-10-CM

## 2020-02-28 DIAGNOSIS — E87.6 HYPOKALEMIA: ICD-10-CM

## 2020-03-02 RX ORDER — LOSARTAN POTASSIUM 100 MG/1
TABLET ORAL
Qty: 90 TABLET | Refills: 1 | Status: SHIPPED | OUTPATIENT
Start: 2020-03-02 | End: 2020-10-27

## 2020-03-02 RX ORDER — POTASSIUM CHLORIDE 1500 MG/1
TABLET, EXTENDED RELEASE ORAL
Qty: 30 TABLET | Refills: 5 | Status: SHIPPED | OUTPATIENT
Start: 2020-03-02 | End: 2020-04-13

## 2020-03-02 NOTE — TELEPHONE ENCOUNTER
losartan (COZAAR) 100 MG tablet      Last Written Prescription Date:  11/4/19  Last Fill Quantity: 30,   # refills: 3  Last Office Visit: 2/26/20  Future Office visit:    Next 5 appointments (look out 90 days)    Mar 30, 2020  8:30 AM CDT  (Arrive by 8:15 AM)  SHORT with Daja Obrien NP  [facility] ([facility] ) [id number]  [facility] [address]  [phone]           Routing refill request to provider for review/approval because:   Last blood pressure under 140/90 in past 12 months     BP Readings from Last 3 Encounters:   02/26/20 (!) 140/110   11/26/19 120/86   08/12/19 122/88     Please advise. Thank you!

## 2020-03-18 DIAGNOSIS — G44.209 TENSION HEADACHE: ICD-10-CM

## 2020-03-18 NOTE — TELEPHONE ENCOUNTER
Ibuprofen 800 mg   Last Written Prescription Date:  11-  Last Fill Quantity: 90,   # refills: 0  Last Office Visit: 02/26/2020  Future Office visit:    Next 5 appointments (look out 90 days)    Mar 30, 2020  8:30 AM CDT  (Arrive by 8:15 AM)  SHORT with Daja Obrien NP  [facility] ([facility] ) [address]  [facility] [address]  [phone]

## 2020-03-19 RX ORDER — IBUPROFEN 800 MG/1
800 TABLET, FILM COATED ORAL EVERY 8 HOURS PRN
Qty: 90 TABLET | Refills: 0 | Status: SHIPPED | OUTPATIENT
Start: 2020-03-19 | End: 2020-05-11

## 2020-03-24 ENCOUNTER — TELEPHONE (OUTPATIENT)
Dept: FAMILY MEDICINE | Facility: OTHER | Age: 50
End: 2020-03-24

## 2020-03-27 DIAGNOSIS — G44.209 TENSION HEADACHE: ICD-10-CM

## 2020-03-27 RX ORDER — IBUPROFEN 800 MG/1
TABLET, FILM COATED ORAL
Qty: 90 TABLET | Refills: 0 | OUTPATIENT
Start: 2020-03-27

## 2020-03-30 ENCOUNTER — VIRTUAL VISIT (OUTPATIENT)
Dept: FAMILY MEDICINE | Facility: OTHER | Age: 50
End: 2020-03-30
Attending: NURSE PRACTITIONER
Payer: COMMERCIAL

## 2020-03-30 VITALS — TEMPERATURE: 98.9 F

## 2020-03-30 DIAGNOSIS — F33.0 MILD RECURRENT MAJOR DEPRESSION (H): ICD-10-CM

## 2020-03-30 DIAGNOSIS — Z79.4 TYPE 2 DIABETES MELLITUS WITHOUT COMPLICATION, WITH LONG-TERM CURRENT USE OF INSULIN (H): ICD-10-CM

## 2020-03-30 DIAGNOSIS — F51.01 PRIMARY INSOMNIA: ICD-10-CM

## 2020-03-30 DIAGNOSIS — E11.9 TYPE 2 DIABETES MELLITUS WITHOUT COMPLICATION, WITH LONG-TERM CURRENT USE OF INSULIN (H): ICD-10-CM

## 2020-03-30 DIAGNOSIS — E55.9 VITAMIN D DEFICIENCY: ICD-10-CM

## 2020-03-30 DIAGNOSIS — I10 BENIGN ESSENTIAL HYPERTENSION: ICD-10-CM

## 2020-03-30 DIAGNOSIS — E78.5 HYPERLIPIDEMIA LDL GOAL <100: Primary | ICD-10-CM

## 2020-03-30 DIAGNOSIS — F41.9 ANXIETY: ICD-10-CM

## 2020-03-30 PROCEDURE — 99443 ZZC PHYSICIAN TELEPHONE EVALUATION 21-30 MIN: CPT | Performed by: NURSE PRACTITIONER

## 2020-03-30 RX ORDER — VENLAFAXINE HYDROCHLORIDE 150 MG/1
150 CAPSULE, EXTENDED RELEASE ORAL DAILY
Qty: 30 CAPSULE | Refills: 1 | Status: SHIPPED | OUTPATIENT
Start: 2020-03-30 | End: 2020-04-13

## 2020-03-30 RX ORDER — TRAZODONE HYDROCHLORIDE 50 MG/1
50-100 TABLET, FILM COATED ORAL AT BEDTIME
Qty: 60 TABLET | Refills: 1 | Status: SHIPPED | OUTPATIENT
Start: 2020-03-30 | End: 2020-09-28

## 2020-03-30 RX ORDER — CHOLECALCIFEROL (VITAMIN D3) 50 MCG
1 TABLET ORAL DAILY
Qty: 90 TABLET | Refills: 2 | Status: SHIPPED | OUTPATIENT
Start: 2020-03-30 | End: 2020-06-01

## 2020-03-30 RX ORDER — INSULIN GLARGINE 100 [IU]/ML
60 INJECTION, SOLUTION SUBCUTANEOUS 2 TIMES DAILY
Qty: 30 ML | Refills: 1 | Status: SHIPPED | OUTPATIENT
Start: 2020-03-30 | End: 2020-04-13

## 2020-03-30 ASSESSMENT — ANXIETY QUESTIONNAIRES
GAD7 TOTAL SCORE: 7
6. BECOMING EASILY ANNOYED OR IRRITABLE: SEVERAL DAYS
5. BEING SO RESTLESS THAT IT IS HARD TO SIT STILL: SEVERAL DAYS
2. NOT BEING ABLE TO STOP OR CONTROL WORRYING: SEVERAL DAYS
IF YOU CHECKED OFF ANY PROBLEMS ON THIS QUESTIONNAIRE, HOW DIFFICULT HAVE THESE PROBLEMS MADE IT FOR YOU TO DO YOUR WORK, TAKE CARE OF THINGS AT HOME, OR GET ALONG WITH OTHER PEOPLE: SOMEWHAT DIFFICULT
7. FEELING AFRAID AS IF SOMETHING AWFUL MIGHT HAPPEN: SEVERAL DAYS
1. FEELING NERVOUS, ANXIOUS, OR ON EDGE: SEVERAL DAYS
3. WORRYING TOO MUCH ABOUT DIFFERENT THINGS: SEVERAL DAYS
4. TROUBLE RELAXING: SEVERAL DAYS

## 2020-03-30 ASSESSMENT — PATIENT HEALTH QUESTIONNAIRE - PHQ9: SUM OF ALL RESPONSES TO PHQ QUESTIONS 1-9: 9

## 2020-03-30 ASSESSMENT — PAIN SCALES - GENERAL: PAINLEVEL: MODERATE PAIN (5)

## 2020-03-30 NOTE — PROGRESS NOTES
"Subjective     Lili Nava is a 49 year old female who is being evaluated via a billable telephone visit.      The patient has been notified of following:     \"This telephone visit will be conducted via a call between you and your physician/provider. We have found that certain health care needs can be provided without the need for a physical exam.  This service lets us provide the care you need with a short phone conversation.  If a prescription is necessary we can send it directly to your pharmacy.  If lab work is needed we can place an order for that and you can then stop by our lab to have the test done at a later time.    If during the course of the call the physician/provider feels a telephone visit is not appropriate, you will not be charged for this service.\"     Physician has received verbal consent for a Telephone Visit from the patient? Yes    Lili Nava complains of   Chief Complaint   Patient presents with     Diabetes     Hypertension     Lipids     Depression     Anxiety       ALLERGIES  Celexa [citalopram] and Lisinopril    Diabetes Follow-up    How often are you checking your blood sugar? Two times daily - has tried to use sensor, but is unable as bust size is so large that she cannot reach she had to revert back to finger sticks.    Blood sugar testing frequency justification:  Uncontrolled diabetes  What time of day are you checking your blood sugars (select all that apply)?  Before meals  Have you had any blood sugars above 200?  Yes a couple   Have you had any blood sugars below 70?  No    What symptoms do you notice when your blood sugar is low?  Other: tired a little shaky at times     What concerns do you have today about your diabetes? None     Do you have any of these symptoms? (Select all that apply)  Numbness in feet, Burning in feet, Excessive thirst and Blurry vision    Have you had a diabetic eye exam in the last 12 months? No     She has been taking 55 units basaglar twice " daily.                  Hyperlipidemia Follow-Up      Are you regularly taking any medication or supplement to lower your cholesterol?   Yes Zocor 10 mg    Are you having muscle aches or other side effects that you think could be caused by your cholesterol lowering medication?  No    Hypertension Follow-up      Do you check your blood pressure regularly outside of the clinic? No - can check at work.      Are you following a low salt diet? Yes    Are your blood pressures ever more than 140 on the top number (systolic) OR more   than 90 on the bottom number (diastolic), for example 140/90? No    BP Readings from Last 2 Encounters:   20 (!) 140/110   19 120/86     Hemoglobin A1C (%)   Date Value   2020 9.9 (H)   2019 7.5 (H)     LDL Cholesterol Calculated (mg/dL)   Date Value   2020 77   2019 71       Depression and Anxiety Follow-Up    How are you doing with your depression since your last visit? Worsened     How are you doing with your anxiety since your last visit?  Worsened     Are you having other symptoms that might be associated with depression or anxiety? Yes:  panic attacks bouts of crying and insomnia.  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      Have you had a significant life event? Relationship Concerns - broke up with longstanding boyfriend.      Do you have any concerns with your use of alcohol or other drugs? No    Social History     Tobacco Use     Smoking status: Former Smoker     Types: Cigarettes     Last attempt to quit: 1988     Years since quittin.2     Smokeless tobacco: Never Used   Substance Use Topics     Alcohol use: Yes     Comment: rare     Drug use: No     PHQ 2019 2020 3/30/2020   PHQ-9 Total Score 19 11 9   Q9: Thoughts of better off dead/self-harm past 2 weeks More than half the days Not at all Several days   F/U: Thoughts of suicide or self-harm No - -   F/U: Safety concerns No - -      [place]-7 SCORE 2019 2020 3/30/2020   Total Score - - -   Total Score 14 8 7       Suicide Assessment Five-step Evaluation and Treatment (SAFE-T)      How many servings of fruits and vegetables do you eat daily?  2-3    On average, how many sweetened beverages do you drink each day (Examples: soda, juice, sweet tea, etc.  Do NOT count diet or artificially sweetened beverages)?   0    How many days per week do you exercise enough to make your heart beat faster? 3 or less    How many minutes a day do you exercise enough to make your heart beat faster? 9 or less    How many days per week do you miss taking your medication? 0    As above, large breasts are causing increased back pain and limiting mobility.           Patient Active Problem List   Diagnosis     Type 2 diabetes mellitus with diabetic polyneuropathy, with long-term current use of insulin (H)     Benign essential hypertension     Anxiety     Mild recurrent major depression (H)     Post traumatic stress disorder     Diabetic neuropathy associated with type 2 diabetes mellitus (H)     Primary insomnia     Eczema     Vitamin D deficiency     Mixed incontinence urge and stress (male)(female)     Hyperlipidemia LDL goal <100     Obesity (BMI 35.0-39.9) with comorbidity (H)     Past Surgical History:   Procedure Laterality Date     COLONOSCOPY  2013     HYSTERECTOMY TOTAL ABDOMINAL N/A 2008    ovaries remain.        Social History     Tobacco Use     Smoking status: Former Smoker     Types: Cigarettes     Last attempt to quit: 1988     Years since quittin.2     Smokeless tobacco: Never Used   Substance Use Topics     Alcohol use: Yes     Comment: rare     Family History   Problem Relation Age of Onset     Hypertension Mother      Depression Mother      Coronary Artery Disease Father          Current Outpatient Medications   Medication Sig Dispense Refill     amLODIPine (NORVASC) 10 MG tablet Take 1 tablet (10 mg) by mouth daily 90  tablet 1     aspirin 81 MG EC tablet Take 1 tablet (81 mg) by mouth daily 90 tablet 3     blood glucose (NO BRAND SPECIFIED) lancets standard Use to test blood sugar twice daily or as directed. 100 each 11     blood glucose (NO BRAND SPECIFIED) test strip Use to test blood sugars 2 times daily or as directed 100 each 1     blood glucose monitoring (ONE TOUCH ULTRA MINI) meter device kit Use to test blood sugars 2 times daily or as directed. 1 kit 0     clobetasol (TEMOVATE) 0.05 % external cream Apply topically 2 times daily 80 g 1     escitalopram (LEXAPRO) 20 MG tablet Take 1 tablet (20 mg) by mouth daily 30 tablet 5     estradiol (VAGIFEM) 10 MCG TABS vaginal tablet Place 1 tablet (10 mcg) vaginally twice a week 8 tablet 3     exenatide ER (BYDUREON) 2 MG recon vial kit susp for weekly inj Inject 2 mg Subcutaneous once a week 4 kit 11     fluconazole (DIFLUCAN) 150 MG tablet Take one tablet once a week 4 tablet 5     hydrOXYzine (ATARAX) 25 MG tablet Take 1-2 tablets (25-50 mg) by mouth every 6 hours as needed for anxiety (or insomnia) 60 tablet 1     ibuprofen (ADVIL/MOTRIN) 800 MG tablet Take 1 tablet (800 mg) by mouth every 8 hours as needed for moderate pain 90 tablet 0     insulin glargine (BASAGLAR KWIKPEN) 100 UNIT/ML pen Inject 60 Units Subcutaneous 2 times daily 30 mL 1     insulin pen needle (ULTICARE MINI) 31G X 6 MM miscellaneous USE 1 DAILY OR AS DIRECTED. 100 each 1     lidocaine (XYLOCAINE) 5 % ointment Apply topically as needed for moderate pain 750 g 4     losartan (COZAAR) 100 MG tablet TAKE 1 TABLET BY MOUTH DAILY 90 tablet 1     metFORMIN (GLUCOPHAGE-XR) 500 MG 24 hr tablet TAKE 1 TABLET (500MG) BY MOUTH DAILY (WITH DINNER) 90 tablet 0     metoprolol succinate ER (TOPROL-XL) 100 MG 24 hr tablet TAKE 1 TABLET (100MG) BY MOUTH DAILY 90 tablet 3     ondansetron (ZOFRAN) 4 MG tablet Take 1 tablet (4 mg) by mouth 3 times daily as needed for nausea 60 tablet 1     oxybutynin ER (DITROPAN-XL) 10 MG  24 hr tablet TAKE 1 TABLET BY MOUTH DAILY 30 tablet 5     pioglitazone (ACTOS) 30 MG tablet TAKE 1 TABLET BY MOUTH ONCE DAILY 90 tablet 0     potassium chloride ER (KLOR-CON M) 20 MEQ CR tablet TAKE 1 TABLET BY MOUTH EVERY DAY 30 tablet 5     simvastatin (ZOCOR) 10 MG tablet TAKE 1 TABLET BY MOUTH NIGHTLY AT BEDTIME 90 tablet 3     traZODone (DESYREL) 50 MG tablet Take 1-2 tablets ( mg) by mouth At Bedtime 60 tablet 1     venlafaxine (EFFEXOR-XR) 150 MG 24 hr capsule Take 1 capsule (150 mg) by mouth daily 30 capsule 1     vitamin D3 (CHOLECALCIFEROL) 2000 units (50 mcg) tablet Take 1 tablet (2,000 Units) by mouth daily 90 tablet 2     gabapentin (NEURONTIN) 300 MG capsule Take 1 tablet (300 mg) every night for 1-3 days, then 1 tablet twice daily for 1-3 days, then 1 tablet three times daily (Patient not taking: Reported on 3/30/2020) 90 capsule 0     order for DME Blood pressure monitor (Patient not taking: Reported on 3/30/2020) 1 each 0     Allergies   Allergen Reactions     Celexa [Citalopram] Other (See Comments)     jittery     Lisinopril Cough     Recent Labs   Lab Test 02/26/20  1144 11/26/19  1124 08/12/19  1104   A1C 9.9* 7.5* 7.0*   LDL 77 71 67   HDL 49* 50 57   TRIG 78 66 77   ALT 44 42 29   CR 0.70 0.59 0.60   GFRESTIMATED >90 >90 >90   GFRESTBLACK >90 >90 >90   POTASSIUM 4.3 4.1 3.9   TSH 3.83 0.02* 1.62      BP Readings from Last 3 Encounters:   02/26/20 (!) 140/110   11/26/19 120/86   08/12/19 122/88    Wt Readings from Last 3 Encounters:   02/26/20 90.4 kg (199 lb 3.2 oz)   11/26/19 92.6 kg (204 lb 3.2 oz)   08/12/19 91.8 kg (202 lb 4.8 oz)                    Reviewed and updated as needed this visit by Provider         Review of Systems   ROS COMP: Constitutional, HEENT, cardiovascular, pulmonary, gi and gu systems are negative, except as otherwise noted.            Assessment/Plan:  1. Vitamin D deficiency  - vitamin D3 (CHOLECALCIFEROL) 2000 units (50 mcg) tablet; Take 1 tablet (2,000  Units) by mouth daily  Dispense: 90 tablet; Refill: 2    2. Hyperlipidemia LDL goal <100  Continue current plan  The 10-year ASCVD risk score ([place] FREEMAN Jr., et al., 2013) is: 2.5%    Values used to calculate the score:      Age: 49 years      Sex: Female      Is Non- : No      Diabetic: Yes      Tobacco smoker: No      Systolic Blood Pressure: 140 mmHg      Is BP treated: Yes      HDL Cholesterol: 49 mg/dL      Total Cholesterol: 142 mg/dL      3. Benign essential hypertension  Continue current medications, please check blood pressure at work and record.  Will do another phone follow-up in 2 weeks to review readings.      4. Mild recurrent major depression (H)  Continue lexapro, restart effexor.    - venlafaxine (EFFEXOR-XR) 150 MG 24 hr capsule; Take 1 capsule (150 mg) by mouth daily  Dispense: 30 capsule; Refill: 1    5. Anxiety  As above  - venlafaxine (EFFEXOR-XR) 150 MG 24 hr capsule; Take 1 capsule (150 mg) by mouth daily  Dispense: 30 capsule; Refill: 1    6. Primary insomnia  - traZODone (DESYREL) 50 MG tablet; Take 1-2 tablets ( mg) by mouth At Bedtime  Dispense: 60 tablet; Refill: 1    7. Type 2 diabetes mellitus without complication, with long-term current use of insulin (H)  Increase dose, may consider going to u200 or U300 insulin.    - insulin glargine (BASAGLAR KWIKPEN) 100 UNIT/ML pen; Inject 60 Units Subcutaneous 2 times daily  Dispense: 30 mL; Refill: 1    Follow-up in 2 weeks via telephone encounter due to COVID-19.      Phone call duration:  22 minutes    Daja Obrien,   Certified Adult Nurse Practitioner  [phone]

## 2020-03-31 ASSESSMENT — ANXIETY QUESTIONNAIRES: GAD7 TOTAL SCORE: 7

## 2020-04-06 DIAGNOSIS — G44.209 TENSION HEADACHE: ICD-10-CM

## 2020-04-06 RX ORDER — IBUPROFEN 800 MG/1
800 TABLET, FILM COATED ORAL EVERY 8 HOURS PRN
Qty: 90 TABLET | Refills: 0 | OUTPATIENT
Start: 2020-04-06

## 2020-04-06 NOTE — TELEPHONE ENCOUNTER
Ibuprofen      Last Written Prescription Date:  3/19/20  Last Fill Quantity: 90,   # refills: 0  Last Office Visit: 3/30/20 virtual visit    Denying current refill request at it is too soon.  Refill not due until 4/19/20.

## 2020-04-13 ENCOUNTER — VIRTUAL VISIT (OUTPATIENT)
Dept: FAMILY MEDICINE | Facility: OTHER | Age: 50
End: 2020-04-13
Attending: NURSE PRACTITIONER
Payer: COMMERCIAL

## 2020-04-13 VITALS — SYSTOLIC BLOOD PRESSURE: 140 MMHG | DIASTOLIC BLOOD PRESSURE: 110 MMHG

## 2020-04-13 DIAGNOSIS — F41.9 ANXIETY: ICD-10-CM

## 2020-04-13 DIAGNOSIS — Z79.4 TYPE 2 DIABETES MELLITUS WITHOUT COMPLICATION, WITH LONG-TERM CURRENT USE OF INSULIN (H): ICD-10-CM

## 2020-04-13 DIAGNOSIS — E11.42 TYPE 2 DIABETES MELLITUS WITH DIABETIC POLYNEUROPATHY, WITH LONG-TERM CURRENT USE OF INSULIN (H): ICD-10-CM

## 2020-04-13 DIAGNOSIS — Z79.4 TYPE 2 DIABETES MELLITUS WITH DIABETIC POLYNEUROPATHY, WITH LONG-TERM CURRENT USE OF INSULIN (H): ICD-10-CM

## 2020-04-13 DIAGNOSIS — E11.9 TYPE 2 DIABETES MELLITUS WITHOUT COMPLICATION, WITH LONG-TERM CURRENT USE OF INSULIN (H): ICD-10-CM

## 2020-04-13 DIAGNOSIS — F33.0 MILD RECURRENT MAJOR DEPRESSION (H): ICD-10-CM

## 2020-04-13 DIAGNOSIS — E66.01 MORBID OBESITY (H): Primary | ICD-10-CM

## 2020-04-13 DIAGNOSIS — N39.46 MIXED INCONTINENCE URGE AND STRESS (MALE)(FEMALE): ICD-10-CM

## 2020-04-13 DIAGNOSIS — E87.6 HYPOKALEMIA: ICD-10-CM

## 2020-04-13 DIAGNOSIS — E78.5 HYPERLIPIDEMIA LDL GOAL <100: ICD-10-CM

## 2020-04-13 DIAGNOSIS — I10 BENIGN ESSENTIAL HYPERTENSION: ICD-10-CM

## 2020-04-13 PROCEDURE — 99442 ZZC PHYSICIAN TELEPHONE EVALUATION 11-20 MIN: CPT | Performed by: NURSE PRACTITIONER

## 2020-04-13 RX ORDER — PIOGLITAZONEHYDROCHLORIDE 30 MG/1
30 TABLET ORAL DAILY
Qty: 90 TABLET | Refills: 1 | Status: SHIPPED | OUTPATIENT
Start: 2020-04-13 | End: 2020-10-27

## 2020-04-13 RX ORDER — HYDROCHLOROTHIAZIDE 25 MG/1
25 TABLET ORAL DAILY
Qty: 30 TABLET | Refills: 1 | Status: SHIPPED | OUTPATIENT
Start: 2020-04-13 | End: 2020-07-28 | Stop reason: ALTCHOICE

## 2020-04-13 RX ORDER — SIMVASTATIN 10 MG
10 TABLET ORAL AT BEDTIME
Qty: 90 TABLET | Refills: 3 | Status: SHIPPED | OUTPATIENT
Start: 2020-04-13 | End: 2021-05-13

## 2020-04-13 RX ORDER — VENLAFAXINE HYDROCHLORIDE 150 MG/1
150 CAPSULE, EXTENDED RELEASE ORAL DAILY
Qty: 30 CAPSULE | Refills: 1 | Status: SHIPPED | OUTPATIENT
Start: 2020-04-13 | End: 2020-06-01

## 2020-04-13 RX ORDER — INSULIN GLARGINE 100 [IU]/ML
60 INJECTION, SOLUTION SUBCUTANEOUS 2 TIMES DAILY
Qty: 30 ML | Refills: 1 | Status: SHIPPED | OUTPATIENT
Start: 2020-04-13 | End: 2020-07-29

## 2020-04-13 RX ORDER — POTASSIUM CHLORIDE 1500 MG/1
20 TABLET, EXTENDED RELEASE ORAL DAILY
Qty: 90 TABLET | Refills: 1 | Status: SHIPPED | OUTPATIENT
Start: 2020-04-13 | End: 2021-02-02

## 2020-04-13 RX ORDER — ESCITALOPRAM OXALATE 20 MG/1
20 TABLET ORAL DAILY
Qty: 30 TABLET | Refills: 5 | Status: SHIPPED | OUTPATIENT
Start: 2020-04-13 | End: 2020-06-01

## 2020-04-13 RX ORDER — OXYBUTYNIN CHLORIDE 10 MG/1
10 TABLET, EXTENDED RELEASE ORAL DAILY
Qty: 90 TABLET | Refills: 1 | Status: SHIPPED | OUTPATIENT
Start: 2020-04-13 | End: 2021-02-02

## 2020-04-13 RX ORDER — METOPROLOL SUCCINATE 100 MG/1
100 TABLET, EXTENDED RELEASE ORAL DAILY
Qty: 90 TABLET | Refills: 1 | Status: SHIPPED | OUTPATIENT
Start: 2020-04-13

## 2020-04-13 ASSESSMENT — ANXIETY QUESTIONNAIRES
3. WORRYING TOO MUCH ABOUT DIFFERENT THINGS: NEARLY EVERY DAY
5. BEING SO RESTLESS THAT IT IS HARD TO SIT STILL: NEARLY EVERY DAY
7. FEELING AFRAID AS IF SOMETHING AWFUL MIGHT HAPPEN: NEARLY EVERY DAY
1. FEELING NERVOUS, ANXIOUS, OR ON EDGE: NEARLY EVERY DAY
2. NOT BEING ABLE TO STOP OR CONTROL WORRYING: NEARLY EVERY DAY
4. TROUBLE RELAXING: NEARLY EVERY DAY
6. BECOMING EASILY ANNOYED OR IRRITABLE: NEARLY EVERY DAY
GAD7 TOTAL SCORE: 21

## 2020-04-13 ASSESSMENT — PATIENT HEALTH QUESTIONNAIRE - PHQ9: SUM OF ALL RESPONSES TO PHQ QUESTIONS 1-9: 24

## 2020-04-13 NOTE — PATIENT INSTRUCTIONS
Assessment/Plan:  1. Anxiety  Restart lexapro and effexor  - escitalopram (LEXAPRO) 20 MG tablet; Take 1 tablet (20 mg) by mouth daily  Dispense: 30 tablet; Refill: 5  - venlafaxine (EFFEXOR-XR) 150 MG 24 hr capsule; Take 1 capsule (150 mg) by mouth daily  Dispense: 30 capsule; Refill: 1  Consider counseling    2. Mild recurrent major depression (H)  - venlafaxine (EFFEXOR-XR) 150 MG 24 hr capsule; Take 1 capsule (150 mg) by mouth daily  Dispense: 30 capsule; Refill: 1    3. Type 2 diabetes mellitus without complication, with long-term current use of insulin (H)  - insulin glargine (BASAGLAR KWIKPEN) 100 UNIT/ML pen; Inject 60 Units Subcutaneous 2 times daily  Dispense: 30 mL; Refill: 1  - exenatide ER (BYDUREON) 2 MG recon vial kit susp for weekly inj; Inject 2 mg Subcutaneous once a week  Dispense: 4 kit; Refill: 11    4. Benign essential hypertension  - hydrochlorothiazide (HYDRODIURIL) 25 MG tablet; Take 1 tablet (25 mg) by mouth daily  Dispense: 30 tablet; Refill: 1  - metoprolol succinate ER (TOPROL-XL) 100 MG 24 hr tablet; Take 1 tablet (100 mg) by mouth daily  Dispense: 90 tablet; Refill: 1    5. Type 2 diabetes mellitus with diabetic polyneuropathy, with long-term current use of insulin (H)  - pioglitazone (ACTOS) 30 MG tablet; Take 1 tablet (30 mg) by mouth daily  Dispense: 90 tablet; Refill: 1    6. Mixed incontinence urge and stress (male)(female)  - oxybutynin ER (DITROPAN-XL) 10 MG 24 hr tablet; Take 1 tablet (10 mg) by mouth daily  Dispense: 90 tablet; Refill: 1    7. Hyperlipidemia LDL goal <100  - simvastatin (ZOCOR) 10 MG tablet; Take 1 tablet (10 mg) by mouth At Bedtime  Dispense: 90 tablet; Refill: 3    8. Hypokalemia  - potassium chloride ER (KLOR-CON M) 20 MEQ CR tablet; Take 1 tablet (20 mEq) by mouth daily  Dispense: 90 tablet; Refill: 1    9. Morbid obesity (H)  [facility], watch online informational video, will make referral if interested.        Return in about 2 weeks  (around 4/27/2020) for anxiety/depression, hypertension.      Phone call duration:  18 minutes    Daja Obrien,   Certified Adult Nurse Practitioner  [phone]       Psychologists/ Counselors      [place]   [phone]  [facility]   [phone]  [facility] [phone]  [facility] (kids) [phone]  [facility](teens) [phone]  [facility]  [phone]  [facility]  [phone]  [facility]  [phone]  [facility]  [phone]  [facility]  [phone]  [facility] counseling [phone]   [facility]   [phone]   [facility]    [phone]   [facility]   [phone]   (Shweta Leone)   [facility]  [phone]  [facility] [phone]  [facility]  [phone]  [facility]  [phone]     [facility] [phone]      [facility] counseling [phone]  [facility]   [phone]  [facility] (James Singh LMFT)                                                   [phone]  [facility]  [phone]  [facility] [phone]  [facility]      [phone]  [facility]       [phone]  [facility]  [phone]  [facility]      [phone]     [place]   [phone]  [facility]      [phone]  [facility] [address] AKBAR Orellana [phone]  [facility]      [phone]  [facility]  [phone]    [facility]                      [phone]    *Facilities in bold italics indicate medication management  Services are offered.    Crisis support  Mobile crisis line- [phone]  [facility]: Free online resources including therapy for Mental Health and substance use problems: Http://thriverange.org    Crisis Text Line  Text HOME to 184-259 -  The Crisis Text Line serves anyone, in any type of crisis, providing access to free, 24/7 support and information via the medium people already use and trust  http://www.crisistextline.org   Here's how it works:  Text HOME to 333-095 from anywhere in the [place], anytime, about any type of crisis.  A live, trained Crisis Counselor receives the text and responds quickly.  The volunteer Crisis Counselor will help you move from a 'hot moment to a cool moment'

## 2020-04-13 NOTE — PROGRESS NOTES
"Lili Nava is a 49 year old female who is being evaluated via a billable telephone visit.      The patient has been notified of following:     \"This telephone visit will be conducted via a call between you and your physician/provider. We have found that certain health care needs can be provided without the need for a physical exam.  This service lets us provide the care you need with a short phone conversation.  If a prescription is necessary we can send it directly to your pharmacy.  If lab work is needed we can place an order for that and you can then stop by our lab to have the test done at a later time.    Telephone visits are billed at different rates depending on your insurance coverage. During this emergency period, for some insurers they may be billed the same as an in-person visit.  Please reach out to your insurance provider with any questions.    If during the course of the call the physician/provider feels a telephone visit is not appropriate, you will not be charged for this service.\"    Patient has given verbal consent for Telephone visit?  Yes    How would you like to obtain your AVS? Mail a copy    Subjective     Lili Nava is a 49 year old female who presents to clinic today for the following health issues:    Hypertension Follow-up      Do you check your blood pressure regularly outside of the clinic? Yes     Are you following a low salt diet? Yes    Are your blood pressures ever more than 140 on the top number (systolic) OR more   than 90 on the bottom number (diastolic), for example 140/90? Yes   140/110 yesterday at work.    Has been taking norvasc and losartan, stopped metoprolol a few months ago due to abdominal pain.  She also stopped metformin.  Glucose has been     Depression and Anxiety Follow-Up    How are you doing with your depression since your last visit? Worsened     How are you doing with your anxiety since your last visit?  Worsened     Are you having other symptoms " that might be associated with depression or anxiety? Yes:  leg weakness and pain    Have you had a significant life event? No     Do you have any concerns with your use of alcohol or other drugs? No     Recent breakup, she was to continue lexapro but stopped, was not able to  effexor - pharmacy said did not receive script.      She needs refill of several other medications, all other chronic conditions are stable.      Social History     Tobacco Use     Smoking status: Former Smoker     Types: Cigarettes     Last attempt to quit: 1988     Years since quittin.3     Smokeless tobacco: Never Used   Substance Use Topics     Alcohol use: Yes     Comment: rare     Drug use: No     PHQ 2020 3/30/2020 2020   PHQ-9 Total Score 11 9 24   Q9: Thoughts of better off dead/self-harm past 2 weeks Not at all Several days Not at all   F/U: Thoughts of suicide or self-harm - - -   F/U: Safety concerns - - -     [place]-7 SCORE 2020 3/30/2020 2020   Total Score - - -   Total Score 8 7 21     Last PHQ-9 2020   1.  Little interest or pleasure in doing things 3   2.  Feeling down, depressed, or hopeless 3   3.  Trouble falling or staying asleep, or sleeping too much 3   4.  Feeling tired or having little energy 3   5.  Poor appetite or overeating 3   6.  Feeling bad about yourself 3   7.  Trouble concentrating 3   8.  Moving slowly or restless 3   Q9: Thoughts of better off dead/self-harm past 2 weeks 0   PHQ-9 Total Score 24   Difficulty at work, home, or with people -   In the past two weeks have you had thoughts of suicide or self harm? -   Do you have concerns about your personal safety or the safety of others? -     VIR[place]-7  2020   1. Feeling nervous, anxious, or on edge 3   2. Not being able to stop or control worrying 3   3. Worrying too much about different things 3   4. Trouble relaxing 3   5. Being so restless that it is hard to sit still 3   6. Becoming easily annoyed or irritable 3   7.  Feeling afraid, as if something awful might happen 3   [place]-7 Total Score 21   If you checked any problems, how difficult have they made it for you to do your work, take care of things at home, or get along with other people? -       Suicide Assessment Five-step Evaluation and Treatment (SAFE-T)      Insomnia  Onset: follow up     Description:   Time to fall asleep (sleep latency): 2 hours  Middle of night awakening:  YES  Early morning awakening:  YES    Progression of Symptoms:  worsening    Accompanying Signs & Symptoms:  Daytime sleepiness/napping: YES  Excessive snoring/apnea: no   Restless legs: YES  Frequent urination: YES  Chronic pain:  no     History:  Prior Insomnia: YES    Precipitating factors:   New stressful situation: YES  Caffeine intake: no   OTC decongestants: no   Any new medications: no     Alleviating factors:  Self medicating (alcohol, etc.):  no    Therapies Tried and outcome: Trazodone           Patient Active Problem List   Diagnosis     Type 2 diabetes mellitus with diabetic polyneuropathy, with long-term current use of insulin (H)     Benign essential hypertension     Anxiety     Mild recurrent major depression (H)     Post traumatic stress disorder     Diabetic neuropathy associated with type 2 diabetes mellitus (H)     Primary insomnia     Eczema     Vitamin D deficiency     Mixed incontinence urge and stress (male)(female)     Hyperlipidemia LDL goal <100     Obesity (BMI 35.0-39.9) with comorbidity (H)     Past Surgical History:   Procedure Laterality Date     COLONOSCOPY  2013     HYSTERECTOMY TOTAL ABDOMINAL N/A 2008    ovaries remain.        Social History     Tobacco Use     Smoking status: Former Smoker     Types: Cigarettes     Last attempt to quit: 1988     Years since quittin.3     Smokeless tobacco: Never Used   Substance Use Topics     Alcohol use: Yes     Comment: rare     Family History   Problem Relation Age of Onset     Hypertension Mother       Depression Mother      Coronary Artery Disease Father          Current Outpatient Medications   Medication Sig Dispense Refill     amLODIPine (NORVASC) 10 MG tablet Take 1 tablet (10 mg) by mouth daily 90 tablet 1     aspirin 81 MG EC tablet Take 1 tablet (81 mg) by mouth daily 90 tablet 3     blood glucose (NO BRAND SPECIFIED) lancets standard Use to test blood sugar twice daily or as directed. 100 each 11     blood glucose (NO BRAND SPECIFIED) test strip Use to test blood sugars 2 times daily or as directed 100 each 1     blood glucose monitoring (ONE TOUCH ULTRA MINI) meter device kit Use to test blood sugars 2 times daily or as directed. 1 kit 0     clobetasol (TEMOVATE) 0.05 % external cream Apply topically 2 times daily 80 g 1     escitalopram (LEXAPRO) 20 MG tablet Take 1 tablet (20 mg) by mouth daily 30 tablet 5     estradiol (VAGIFEM) 10 MCG TABS vaginal tablet Place 1 tablet (10 mcg) vaginally twice a week 8 tablet 3     exenatide ER (BYDUREON) 2 MG recon vial kit susp for weekly inj Inject 2 mg Subcutaneous once a week 4 kit 11     gabapentin (NEURONTIN) 300 MG capsule Take 1 tablet (300 mg) every night for 1-3 days, then 1 tablet twice daily for 1-3 days, then 1 tablet three times daily 90 capsule 0     hydrochlorothiazide (HYDRODIURIL) 25 MG tablet Take 1 tablet (25 mg) by mouth daily 30 tablet 1     hydrOXYzine (ATARAX) 25 MG tablet Take 1-2 tablets (25-50 mg) by mouth every 6 hours as needed for anxiety (or insomnia) 60 tablet 1     ibuprofen (ADVIL/MOTRIN) 800 MG tablet Take 1 tablet (800 mg) by mouth every 8 hours as needed for moderate pain 90 tablet 0     insulin glargine (BASAGLAR KWIKPEN) 100 UNIT/ML pen Inject 60 Units Subcutaneous 2 times daily 30 mL 1     insulin pen needle (ULTICARE MINI) 31G X 6 MM miscellaneous USE 1 DAILY OR AS DIRECTED. 100 each 1     lidocaine (XYLOCAINE) 5 % ointment Apply topically as needed for moderate pain 750 g 4     losartan (COZAAR) 100 MG tablet TAKE 1 TABLET  BY MOUTH DAILY 90 tablet 1     metoprolol succinate ER (TOPROL-XL) 100 MG 24 hr tablet Take 1 tablet (100 mg) by mouth daily 90 tablet 1     ondansetron (ZOFRAN) 4 MG tablet Take 1 tablet (4 mg) by mouth 3 times daily as needed for nausea 60 tablet 1     order for DME Blood pressure monitor 1 each 0     oxybutynin ER (DITROPAN-XL) 10 MG 24 hr tablet Take 1 tablet (10 mg) by mouth daily 90 tablet 1     pioglitazone (ACTOS) 30 MG tablet Take 1 tablet (30 mg) by mouth daily 90 tablet 1     potassium chloride ER (KLOR-CON M) 20 MEQ CR tablet Take 1 tablet (20 mEq) by mouth daily 90 tablet 1     simvastatin (ZOCOR) 10 MG tablet Take 1 tablet (10 mg) by mouth At Bedtime 90 tablet 3     traZODone (DESYREL) 50 MG tablet Take 1-2 tablets ( mg) by mouth At Bedtime 60 tablet 1     venlafaxine (EFFEXOR-XR) 150 MG 24 hr capsule Take 1 capsule (150 mg) by mouth daily 30 capsule 1     vitamin D3 (CHOLECALCIFEROL) 2000 units (50 mcg) tablet Take 1 tablet (2,000 Units) by mouth daily 90 tablet 2     fluconazole (DIFLUCAN) 150 MG tablet Take one tablet once a week (Patient not taking: Reported on 4/13/2020) 4 tablet 5     Allergies   Allergen Reactions     Celexa [Citalopram] Other (See Comments)     jittery     Lisinopril Cough     Recent Labs   Lab Test 02/26/20  1144 11/26/19  1124 08/12/19  1104   A1C 9.9* 7.5* 7.0*   LDL 77 71 67   HDL 49* 50 57   TRIG 78 66 77   ALT 44 42 29   CR 0.70 0.59 0.60   GFRESTIMATED >90 >90 >90   GFRESTBLACK >90 >90 >90   POTASSIUM 4.3 4.1 3.9   TSH 3.83 0.02* 1.62      BP Readings from Last 3 Encounters:   04/13/20 (!) 140/110   02/26/20 (!) 140/110   11/26/19 120/86    Wt Readings from Last 3 Encounters:   02/26/20 90.4 kg (199 lb 3.2 oz)   11/26/19 92.6 kg (204 lb 3.2 oz)   08/12/19 91.8 kg (202 lb 4.8 oz)                    Reviewed and updated as needed this visit by Provider         Review of Systems   ROS COMP: Constitutional, HEENT, cardiovascular, pulmonary, gi and gu systems are  negative, except as otherwise noted.       Objective   Reported vitals:  BP (!) 140/110    alert and no distress  PSYCH: Alert and oriented times 3; coherent speech, normal   rate and volume, able to articulate logical thoughts, able   to abstract reason, no tangential thoughts, no hallucinations   or delusions  Her affect is normal and pleasant  RESP: No cough, no audible wheezing, able to talk in full sentences  Remainder of exam unable to be completed due to telephone visits            Assessment/Plan:  1. Anxiety  Restart lexapro and effexor  - escitalopram (LEXAPRO) 20 MG tablet; Take 1 tablet (20 mg) by mouth daily  Dispense: 30 tablet; Refill: 5  - venlafaxine (EFFEXOR-XR) 150 MG 24 hr capsule; Take 1 capsule (150 mg) by mouth daily  Dispense: 30 capsule; Refill: 1    2. Mild recurrent major depression (H)  - venlafaxine (EFFEXOR-XR) 150 MG 24 hr capsule; Take 1 capsule (150 mg) by mouth daily  Dispense: 30 capsule; Refill: 1    3. Type 2 diabetes mellitus without complication, with long-term current use of insulin (H)  - insulin glargine (BASAGLAR KWIKPEN) 100 UNIT/ML pen; Inject 60 Units Subcutaneous 2 times daily  Dispense: 30 mL; Refill: 1  - exenatide ER (BYDUREON) 2 MG recon vial kit susp for weekly inj; Inject 2 mg Subcutaneous once a week  Dispense: 4 kit; Refill: 11    4. Benign essential hypertension  - hydrochlorothiazide (HYDRODIURIL) 25 MG tablet; Take 1 tablet (25 mg) by mouth daily  Dispense: 30 tablet; Refill: 1  - metoprolol succinate ER (TOPROL-XL) 100 MG 24 hr tablet; Take 1 tablet (100 mg) by mouth daily  Dispense: 90 tablet; Refill: 1    5. Type 2 diabetes mellitus with diabetic polyneuropathy, with long-term current use of insulin (H)  - pioglitazone (ACTOS) 30 MG tablet; Take 1 tablet (30 mg) by mouth daily  Dispense: 90 tablet; Refill: 1    6. Mixed incontinence urge and stress (male)(female)  - oxybutynin ER (DITROPAN-XL) 10 MG 24 hr tablet; Take 1 tablet (10 mg) by mouth daily   Dispense: 90 tablet; Refill: 1    7. Hyperlipidemia LDL goal <100  - simvastatin (ZOCOR) 10 MG tablet; Take 1 tablet (10 mg) by mouth At Bedtime  Dispense: 90 tablet; Refill: 3    8. Hypokalemia  - potassium chloride ER (KLOR-CON M) 20 MEQ CR tablet; Take 1 tablet (20 mEq) by mouth daily  Dispense: 90 tablet; Refill: 1    9. Morbid obesity (H)   weight management, watch online informational video, will make referral if interested.        Return in about 2 weeks (around 4/27/2020) for anxiety/depression, hypertension.      Phone call duration:  18 minutes    Daja Obrien,   Certified Adult Nurse Practitioner  [phone]

## 2020-04-14 ASSESSMENT — ANXIETY QUESTIONNAIRES: GAD7 TOTAL SCORE: 21

## 2020-04-27 ENCOUNTER — VIRTUAL VISIT (OUTPATIENT)
Dept: FAMILY MEDICINE | Facility: OTHER | Age: 50
End: 2020-04-27
Attending: NURSE PRACTITIONER
Payer: COMMERCIAL

## 2020-04-27 DIAGNOSIS — F33.0 MILD RECURRENT MAJOR DEPRESSION (H): Primary | ICD-10-CM

## 2020-04-27 DIAGNOSIS — F41.9 ANXIETY: ICD-10-CM

## 2020-04-27 DIAGNOSIS — F43.10 POST TRAUMATIC STRESS DISORDER: ICD-10-CM

## 2020-04-27 PROCEDURE — 99441 ZZC PHYSICIAN TELEPHONE EVALUATION 5-10 MIN: CPT | Performed by: NURSE PRACTITIONER

## 2020-04-27 RX ORDER — UBIQUINOL 100 MG
CAPSULE ORAL
COMMUNITY
Start: 2020-04-24

## 2020-04-27 RX ORDER — LANCETS 30 GAUGE
EACH MISCELLANEOUS
COMMUNITY
Start: 2020-04-24

## 2020-04-27 RX ORDER — METFORMIN HCL 500 MG
TABLET, EXTENDED RELEASE 24 HR ORAL
COMMUNITY
Start: 2020-02-05

## 2020-04-27 NOTE — PATIENT INSTRUCTIONS
Assessment/Plan:  1. Mild recurrent major depression (H)  Continue current plan    2. Post traumatic stress disorder    3. Anxiety      Return in about 1 month (around 5/27/2020) for anxiety/depression.    Phone call duration:  9 minutes    Daja Obrien,   Certified Adult Nurse Practitioner  [phone]

## 2020-04-27 NOTE — PROGRESS NOTES
"Lili Nava is a 49 year old female who is being evaluated via a billable telephone visit.      The patient has been notified of following:     \"This telephone visit will be conducted via a call between you and your physician/provider. We have found that certain health care needs can be provided without the need for a physical exam.  This service lets us provide the care you need with a short phone conversation.  If a prescription is necessary we can send it directly to your pharmacy.  If lab work is needed we can place an order for that and you can then stop by our lab to have the test done at a later time.    Telephone visits are billed at different rates depending on your insurance coverage. During this emergency period, for some insurers they may be billed the same as an in-person visit.  Please reach out to your insurance provider with any questions.    If during the course of the call the physician/provider feels a telephone visit is not appropriate, you will not be charged for this service.\"    Patient has given verbal consent for Telephone visit?  Yes    How would you like to obtain your AVS? Rosaharsha Maurice     Lili Nava is a 49 year old female who presents to clinic today for the following health issues:    HPI  Depression and Anxiety Follow-Up    How are you doing with your depression since your last visit? No change    How are you doing with your anxiety since your last visit?  No change - perhaps a bit better.  She is not crying all the time any longer.       Are you having other symptoms that might be associated with depression or anxiety? No    Have you had a significant life event? No     Do you have any concerns with your use of alcohol or other drugs? No    Social History     Tobacco Use     Smoking status: Former Smoker     Types: Cigarettes     Last attempt to quit: 1988     Years since quittin.3     Smokeless tobacco: Never Used   Substance Use Topics     Alcohol " use: Yes     Comment: rare     Drug use: No     PHQ 2/26/2020 3/30/2020 4/13/2020   PHQ-9 Total Score 11 9 24   Q9: Thoughts of better off dead/self-harm past 2 weeks Not at all Several days Not at all   F/U: Thoughts of suicide or self-harm - - -   F/U: Safety concerns - - -     [place]-7 SCORE 2/26/2020 3/30/2020 4/13/2020   Total Score - - -   Total Score 8 7 21     Last PHQ-9 4/13/2020   1.  Little interest or pleasure in doing things 3   2.  Feeling down, depressed, or hopeless 3   3.  Trouble falling or staying asleep, or sleeping too much 3   4.  Feeling tired or having little energy 3   5.  Poor appetite or overeating 3   6.  Feeling bad about yourself 3   7.  Trouble concentrating 3   8.  Moving slowly or restless 3   Q9: Thoughts of better off dead/self-harm past 2 weeks 0   PHQ-9 Total Score 24   Difficulty at work, home, or with people -   In the past two weeks have you had thoughts of suicide or self harm? -   Do you have concerns about your personal safety or the safety of others? -     [place]-7  4/13/2020   1. Feeling nervous, anxious, or on edge 3   2. Not being able to stop or control worrying 3   3. Worrying too much about different things 3   4. Trouble relaxing 3   5. Being so restless that it is hard to sit still 3   6. Becoming easily annoyed or irritable 3   7. Feeling afraid, as if something awful might happen 3   [place]-7 Total Score 21   If you checked any problems, how difficult have they made it for you to do your work, take care of things at home, or get along with other people? -         Suicide Assessment Five-step Evaluation and Treatment (SAFE-T)      How many servings of fruits and vegetables do you eat daily?  2-3    On average, how many sweetened beverages do you drink each day (Examples: soda, juice, sweet tea, etc.  Do NOT count diet or artificially sweetened beverages)?   1    How many days per week do you exercise enough to make your heart beat faster? 7    How many minutes a day do you  exercise enough to make your heart beat faster? 30 - 60    How many days per week do you miss taking your medication? 0             Patient Active Problem List   Diagnosis     Type 2 diabetes mellitus with diabetic polyneuropathy, with long-term current use of insulin (H)     Benign essential hypertension     Anxiety     Mild recurrent major depression (H)     Post traumatic stress disorder     Diabetic neuropathy associated with type 2 diabetes mellitus (H)     Primary insomnia     Eczema     Vitamin D deficiency     Mixed incontinence urge and stress (male)(female)     Hyperlipidemia LDL goal <100     Obesity (BMI 35.0-39.9) with comorbidity (H)     Past Surgical History:   Procedure Laterality Date     COLONOSCOPY  2013     HYSTERECTOMY TOTAL ABDOMINAL N/A 2008    ovaries remain.        Social History     Tobacco Use     Smoking status: Former Smoker     Types: Cigarettes     Last attempt to quit: 1988     Years since quittin.3     Smokeless tobacco: Never Used   Substance Use Topics     Alcohol use: Yes     Comment: rare     Family History   Problem Relation Age of Onset     Hypertension Mother      Depression Mother      Coronary Artery Disease Father          Current Outpatient Medications   Medication Sig Dispense Refill     Alcohol Swabs (ALCOHOL PREP) 70 % PADS        amLODIPine (NORVASC) 10 MG tablet Take 1 tablet (10 mg) by mouth daily 90 tablet 1     AquaLance Lancets 30G MISC        aspirin 81 MG EC tablet Take 1 tablet (81 mg) by mouth daily 90 tablet 3     blood glucose (NO BRAND SPECIFIED) lancets standard Use to test blood sugar twice daily or as directed. 100 each 11     blood glucose (NO BRAND SPECIFIED) test strip Use to test blood sugars 2 times daily or as directed 100 each 1     blood glucose monitoring (ONE TOUCH ULTRA MINI) meter device kit Use to test blood sugars 2 times daily or as directed. 1 kit 0     clobetasol (TEMOVATE) 0.05 % external cream Apply topically 2  times daily 80 g 1     escitalopram (LEXAPRO) 20 MG tablet Take 1 tablet (20 mg) by mouth daily 30 tablet 5     estradiol (VAGIFEM) 10 MCG TABS vaginal tablet Place 1 tablet (10 mcg) vaginally twice a week 8 tablet 3     exenatide ER (BYDUREON) 2 MG recon vial kit susp for weekly inj Inject 2 mg Subcutaneous once a week 4 kit 11     fluconazole (DIFLUCAN) 150 MG tablet Take one tablet once a week (Patient taking differently: Take 150 mg by mouth as needed Take one tablet once a week) 4 tablet 5     hydrochlorothiazide (HYDRODIURIL) 25 MG tablet Take 1 tablet (25 mg) by mouth daily 30 tablet 1     hydrOXYzine (ATARAX) 25 MG tablet Take 1-2 tablets (25-50 mg) by mouth every 6 hours as needed for anxiety (or insomnia) 60 tablet 1     ibuprofen (ADVIL/MOTRIN) 800 MG tablet Take 1 tablet (800 mg) by mouth every 8 hours as needed for moderate pain 90 tablet 0     insulin glargine (BASAGLAR KWIKPEN) 100 UNIT/ML pen Inject 60 Units Subcutaneous 2 times daily 30 mL 1     insulin pen needle (ULTICARE MINI) 31G X 6 MM miscellaneous USE 1 DAILY OR AS DIRECTED. 100 each 1     lidocaine (XYLOCAINE) 5 % ointment Apply topically as needed for moderate pain 750 g 4     losartan (COZAAR) 100 MG tablet TAKE 1 TABLET BY MOUTH DAILY 90 tablet 1     metFORMIN (GLUCOPHAGE-XR) 500 MG 24 hr tablet TAKE 1 TABLET (500MG) BY MOUTH DAILY (WITH DINNER)       metoprolol succinate ER (TOPROL-XL) 100 MG 24 hr tablet Take 1 tablet (100 mg) by mouth daily 90 tablet 1     ondansetron (ZOFRAN) 4 MG tablet Take 1 tablet (4 mg) by mouth 3 times daily as needed for nausea 60 tablet 1     order for [facility] Blood pressure monitor 1 each 0     oxybutynin ER (DITROPAN-XL) 10 MG 24 hr tablet Take 1 tablet (10 mg) by mouth daily 90 tablet 1     pioglitazone (ACTOS) 30 MG tablet Take 1 tablet (30 mg) by mouth daily 90 tablet 1     potassium chloride ER (KLOR-CON M) 20 MEQ CR tablet Take 1 tablet (20 mEq) by mouth daily 90 tablet 1     simvastatin (ZOCOR) 10 MG  tablet Take 1 tablet (10 mg) by mouth At Bedtime 90 tablet 3     traZODone (DESYREL) 50 MG tablet Take 1-2 tablets ( mg) by mouth At Bedtime 60 tablet 1     venlafaxine (EFFEXOR-XR) 150 MG 24 hr capsule Take 1 capsule (150 mg) by mouth daily 30 capsule 1     vitamin D3 (CHOLECALCIFEROL) 2000 units (50 mcg) tablet Take 1 tablet (2,000 Units) by mouth daily 90 tablet 2     gabapentin (NEURONTIN) 300 MG capsule Take 1 tablet (300 mg) every night for 1-3 days, then 1 tablet twice daily for 1-3 days, then 1 tablet three times daily (Patient not taking: Reported on 4/27/2020) 90 capsule 0     Allergies   Allergen Reactions     Celexa [Citalopram] Other (See Comments)     jittery     Lisinopril Cough     Recent Labs   Lab Test 02/26/20  1144 11/26/19  1124 08/12/19  1104   A1C 9.9* 7.5* 7.0*   LDL 77 71 67   HDL 49* 50 57   TRIG 78 66 77   ALT 44 42 29   CR 0.70 0.59 0.60   GFRESTIMATED >90 >90 >90   GFRESTBLACK >90 >90 >90   POTASSIUM 4.3 4.1 3.9   TSH 3.83 0.02* 1.62      BP Readings from Last 3 Encounters:   04/13/20 (!) 140/110   02/26/20 (!) 140/110   11/26/19 120/86    Wt Readings from Last 3 Encounters:   02/26/20 90.4 kg (199 lb 3.2 oz)   11/26/19 92.6 kg (204 lb 3.2 oz)   08/12/19 91.8 kg (202 lb 4.8 oz)                    Reviewed and updated as needed this visit by Provider         Review of Systems   ROS COMP: Constitutional, HEENT, cardiovascular, pulmonary, gi and gu systems are negative, except as otherwise noted.       Objective   Reported vitals:  There were no vitals taken for this visit.   alert and no distress  PSYCH: Alert and oriented times 3; coherent speech, normal   rate and volume, able to articulate logical thoughts, able   to abstract reason, no tangential thoughts, no hallucinations   or delusions  Her affect is normal and pleasant  RESP: No cough, no audible wheezing, able to talk in full sentences  Remainder of exam unable to be completed due to telephone visits             Assessment/Plan:  1. Mild recurrent major depression (H)  Continue current plan    2. Post traumatic stress disorder    3. Anxiety      Return in about 1 month (around 5/27/2020) for anxiety/depression.    Phone call duration:  9 minutes    Daja Obrien,   Certified Adult Nurse Practitioner  [phone]

## 2020-04-27 NOTE — NURSING NOTE
Chief Complaint   Patient presents with     Depression     Anxiety       Initial There were no vitals taken for this visit. Estimated body mass index is 38.9 kg/m  as calculated from the following:    Height as of 2/26/20: 1.524 m (5').    Weight as of 2/26/20: 90.4 kg (199 lb 3.2 oz).  Medication Reconciliation: complete  Aleena Glez LPN

## 2020-05-11 DIAGNOSIS — G44.209 TENSION HEADACHE: ICD-10-CM

## 2020-05-11 RX ORDER — IBUPROFEN 800 MG/1
TABLET, FILM COATED ORAL
Qty: 90 TABLET | Refills: 0 | Status: SHIPPED | OUTPATIENT
Start: 2020-05-11 | End: 2020-09-28

## 2020-05-11 NOTE — TELEPHONE ENCOUNTER
Advil      Last Written Prescription Date:  3/19/2020  Last Fill Quantity: ,  90 # refills: 0  Last Office Visit: 4/27/2020  Future Office visit:  none     Routing refill request to provider for review/approval because:    NSAID Medications [id number]/11/2020 12:25 PM   Blood pressure under 140/90 in past 12 months Protocol Details    Normal CBC on file in past 12 months

## 2020-05-28 ENCOUNTER — TRANSFERRED RECORDS (OUTPATIENT)
Dept: HEALTH INFORMATION MANAGEMENT | Facility: CLINIC | Age: 50
End: 2020-05-28

## 2020-06-01 ENCOUNTER — VIRTUAL VISIT (OUTPATIENT)
Dept: FAMILY MEDICINE | Facility: OTHER | Age: 50
End: 2020-06-01
Attending: NURSE PRACTITIONER
Payer: COMMERCIAL

## 2020-06-01 VITALS — BODY MASS INDEX: 38.67 KG/M2 | WEIGHT: 198 LBS

## 2020-06-01 DIAGNOSIS — E66.01 CLASS 2 SEVERE OBESITY WITH SERIOUS COMORBIDITY AND BODY MASS INDEX (BMI) OF 38.0 TO 38.9 IN ADULT, UNSPECIFIED OBESITY TYPE (H): Primary | ICD-10-CM

## 2020-06-01 DIAGNOSIS — F33.0 MILD RECURRENT MAJOR DEPRESSION (H): ICD-10-CM

## 2020-06-01 DIAGNOSIS — E66.812 CLASS 2 SEVERE OBESITY WITH SERIOUS COMORBIDITY AND BODY MASS INDEX (BMI) OF 38.0 TO 38.9 IN ADULT, UNSPECIFIED OBESITY TYPE (H): Primary | ICD-10-CM

## 2020-06-01 DIAGNOSIS — E55.9 VITAMIN D DEFICIENCY: ICD-10-CM

## 2020-06-01 DIAGNOSIS — F41.9 ANXIETY: ICD-10-CM

## 2020-06-01 PROCEDURE — 99213 OFFICE O/P EST LOW 20 MIN: CPT | Mod: 95 | Performed by: NURSE PRACTITIONER

## 2020-06-01 RX ORDER — ESCITALOPRAM OXALATE 20 MG/1
20 TABLET ORAL DAILY
Qty: 90 TABLET | Refills: 1 | Status: SHIPPED | OUTPATIENT
Start: 2020-06-01

## 2020-06-01 RX ORDER — CHOLECALCIFEROL (VITAMIN D3) 50 MCG
2 TABLET ORAL DAILY
Qty: 180 TABLET | Refills: 1 | Status: SHIPPED | OUTPATIENT
Start: 2020-06-01

## 2020-06-01 RX ORDER — VENLAFAXINE HYDROCHLORIDE 150 MG/1
150 CAPSULE, EXTENDED RELEASE ORAL DAILY
Qty: 90 CAPSULE | Refills: 1 | Status: SHIPPED | OUTPATIENT
Start: 2020-06-01

## 2020-06-01 ASSESSMENT — ANXIETY QUESTIONNAIRES
6. BECOMING EASILY ANNOYED OR IRRITABLE: SEVERAL DAYS
3. WORRYING TOO MUCH ABOUT DIFFERENT THINGS: MORE THAN HALF THE DAYS
IF YOU CHECKED OFF ANY PROBLEMS ON THIS QUESTIONNAIRE, HOW DIFFICULT HAVE THESE PROBLEMS MADE IT FOR YOU TO DO YOUR WORK, TAKE CARE OF THINGS AT HOME, OR GET ALONG WITH OTHER PEOPLE: SOMEWHAT DIFFICULT
1. FEELING NERVOUS, ANXIOUS, OR ON EDGE: SEVERAL DAYS
5. BEING SO RESTLESS THAT IT IS HARD TO SIT STILL: SEVERAL DAYS
7. FEELING AFRAID AS IF SOMETHING AWFUL MIGHT HAPPEN: SEVERAL DAYS
GAD7 TOTAL SCORE: 8
2. NOT BEING ABLE TO STOP OR CONTROL WORRYING: SEVERAL DAYS

## 2020-06-01 ASSESSMENT — PATIENT HEALTH QUESTIONNAIRE - PHQ9
SUM OF ALL RESPONSES TO PHQ QUESTIONS 1-9: 14
5. POOR APPETITE OR OVEREATING: SEVERAL DAYS

## 2020-06-01 NOTE — NURSING NOTE
No chief complaint on file.      Initial Wt 89.8 kg (198 lb)   BMI 38.67 kg/m   Estimated body mass index is 38.67 kg/m  as calculated from the following:    Height as of 2/26/20: 1.524 m (5').    Weight as of this encounter: 89.8 kg (198 lb).  Medication Reconciliation: complete  Catherine Medeiros LPN

## 2020-06-01 NOTE — PROGRESS NOTES
"Lili Nava is a 49 year old female who is being evaluated via a billable telephone visit.      The patient has been notified of following:     \"This telephone visit will be conducted via a call between you and your physician/provider. We have found that certain health care needs can be provided without the need for a physical exam.  This service lets us provide the care you need with a short phone conversation.  If a prescription is necessary we can send it directly to your pharmacy.  If lab work is needed we can place an order for that and you can then stop by our lab to have the test done at a later time.    Telephone visits are billed at different rates depending on your insurance coverage. During this emergency period, for some insurers they may be billed the same as an in-person visit.  Please reach out to your insurance provider with any questions.    If during the course of the call the physician/provider feels a telephone visit is not appropriate, you will not be charged for this service.\"    Patient has given verbal consent for Telephone visit?  Yes    What phone number would you like to be contacted at? [phone]    How would you like to obtain your AVS? Mail a copy    Subjective     Lili Nava is a 49 year old female who presents via phone visit today for the following health issues:    HPI  Depression and Anxiety Follow-Up    How are you doing with your depression since your last visit?  improved    How are you doing with your anxiety since your last visit?  Improved - working with her therapist again.  Has     Are you having other symptoms that might be associated with depression or anxiety? Yes:  tiredness and exhaustion.  Overweight, which contributes to myalgia and fatigue.  She is trying to lose weight, watching diet.  She does not drive so she does walk daily to and from work and to all other places.      Have you had a significant life event? No     Do you have any concerns with " your use of alcohol or other drugs? No    Social History     Tobacco Use     Smoking status: Former Smoker     Types: Cigarettes     Last attempt to quit: 1988     Years since quittin.4     Smokeless tobacco: Never Used   Substance Use Topics     Alcohol use: Yes     Comment: rare     Drug use: No     PHQ 3/30/2020 2020 2020   PHQ-9 Total Score 9 24 14   Q9: Thoughts of better off dead/self-harm past 2 weeks Several days Not at all Not at all   F/U: Thoughts of suicide or self-harm - - -   F/U: Safety concerns - - -     [place]-7 SCORE 3/30/2020 2020 2020   Total Score - - -   Total Score 7 21 8     Last PHQ-9 2020   1.  Little interest or pleasure in doing things 2   2.  Feeling down, depressed, or hopeless 3   3.  Trouble falling or staying asleep, or sleeping too much 2   4.  Feeling tired or having little energy 2   5.  Poor appetite or overeating 2   6.  Feeling bad about yourself 1   7.  Trouble concentrating 1   8.  Moving slowly or restless 1   Q9: Thoughts of better off dead/self-harm past 2 weeks 0   PHQ-9 Total Score 14   Difficulty at work, home, or with people Somewhat difficult   In the past two weeks have you had thoughts of suicide or self harm? -   Do you have concerns about your personal safety or the safety of others? -     [place]-7  2020   1. Feeling nervous, anxious, or on edge 1   2. Not being able to stop or control worrying 1   3. Worrying too much about different things 2   4. Trouble relaxing 1   5. Being so restless that it is hard to sit still 1   6. Becoming easily annoyed or irritable 1   7. Feeling afraid, as if something awful might happen 1   [place]-7 Total Score 8   If you checked any problems, how difficult have they made it for you to do your work, take care of things at home, or get along with other people? Somewhat difficult         Suicide Assessment Five-step Evaluation and Treatment (SAFE-T)      How many servings of fruits and vegetables do you eat  daily?  2-3    On average, how many sweetened beverages do you drink each day (Examples: soda, juice, sweet tea, etc.  Do NOT count diet or artificially sweetened beverages)?   2    How many days per week do you exercise enough to make your heart beat faster? 3 or less    How many minutes a day do you exercise enough to make your heart beat faster? 20 - 29    How many days per week do you miss taking your medication? 0             Patient Active Problem List   Diagnosis     Type 2 diabetes mellitus with diabetic polyneuropathy, with long-term current use of insulin (H)     Benign essential hypertension     Anxiety     Mild recurrent major depression (H)     Post traumatic stress disorder     Diabetic neuropathy associated with type 2 diabetes mellitus (H)     Primary insomnia     Eczema     Vitamin D deficiency     Mixed incontinence urge and stress (male)(female)     Hyperlipidemia LDL goal <100     Obesity (BMI 35.0-39.9) with comorbidity (H)     Past Surgical History:   Procedure Laterality Date     COLONOSCOPY  2013     HYSTERECTOMY TOTAL ABDOMINAL N/A 2008    ovaries remain.        Social History     Tobacco Use     Smoking status: Former Smoker     Types: Cigarettes     Last attempt to quit: 1988     Years since quittin.4     Smokeless tobacco: Never Used   Substance Use Topics     Alcohol use: Yes     Comment: rare     Family History   Problem Relation Age of Onset     Hypertension Mother      Depression Mother      Coronary Artery Disease Father          Current Outpatient Medications   Medication Sig Dispense Refill     amLODIPine (NORVASC) 10 MG tablet Take 1 tablet (10 mg) by mouth daily 90 tablet 1     AquaLance Lancets 30G MISC        aspirin 81 MG EC tablet Take 1 tablet (81 mg) by mouth daily 90 tablet 3     blood glucose (NO BRAND SPECIFIED) lancets standard Use to test blood sugar twice daily or as directed. 100 each 11     blood glucose (NO BRAND SPECIFIED) test strip Use to  test blood sugars 2 times daily or as directed 100 each 1     blood glucose monitoring (ONE TOUCH ULTRA MINI) meter device kit Use to test blood sugars 2 times daily or as directed. 1 kit 0     escitalopram (LEXAPRO) 20 MG tablet Take 1 tablet (20 mg) by mouth daily 90 tablet 1     estradiol (VAGIFEM) 10 MCG TABS vaginal tablet Place 1 tablet (10 mcg) vaginally twice a week 8 tablet 3     exenatide ER (BYDUREON) 2 MG recon vial kit susp for weekly inj Inject 2 mg Subcutaneous once a week 4 kit 11     hydrochlorothiazide (HYDRODIURIL) 25 MG tablet Take 1 tablet (25 mg) by mouth daily 30 tablet 1     hydrOXYzine (ATARAX) 25 MG tablet Take 1-2 tablets (25-50 mg) by mouth every 6 hours as needed for anxiety (or insomnia) 60 tablet 1     ibuprofen (ADVIL/MOTRIN) 800 MG tablet TAKE 1 TABLET BY MOUTH EVERY 8 HOURS AS NEEDED FOR MODERATE PAIN 90 tablet 0     insulin glargine (BASAGLAR KWIKPEN) 100 UNIT/ML pen Inject 60 Units Subcutaneous 2 times daily 30 mL 1     insulin pen needle (ULTICARE MINI) 31G X 6 MM miscellaneous USE 1 DAILY OR AS DIRECTED. 100 each 1     losartan (COZAAR) 100 MG tablet TAKE 1 TABLET BY MOUTH DAILY 90 tablet 1     metFORMIN (GLUCOPHAGE-XR) 500 MG 24 hr tablet TAKE 1 TABLET (500MG) BY MOUTH DAILY (WITH DINNER)       metoprolol succinate ER (TOPROL-XL) 100 MG 24 hr tablet Take 1 tablet (100 mg) by mouth daily 90 tablet 1     ondansetron (ZOFRAN) 4 MG tablet Take 1 tablet (4 mg) by mouth 3 times daily as needed for nausea 60 tablet 1     order for DME Blood pressure monitor 1 each 0     oxybutynin ER (DITROPAN-XL) 10 MG 24 hr tablet Take 1 tablet (10 mg) by mouth daily 90 tablet 1     pioglitazone (ACTOS) 30 MG tablet Take 1 tablet (30 mg) by mouth daily 90 tablet 1     potassium chloride ER (KLOR-CON M) 20 MEQ CR tablet Take 1 tablet (20 mEq) by mouth daily 90 tablet 1     simvastatin (ZOCOR) 10 MG tablet Take 1 tablet (10 mg) by mouth At Bedtime 90 tablet 3     traZODone (DESYREL) 50 MG tablet Take  1-2 tablets ( mg) by mouth At Bedtime 60 tablet 1     venlafaxine (EFFEXOR-XR) 150 MG 24 hr capsule Take 1 capsule (150 mg) by mouth daily 90 capsule 1     vitamin D3 (CHOLECALCIFEROL) 2000 units (50 mcg) tablet Take 2 tablets (4,000 Units) by mouth daily 180 tablet 1     Alcohol Swabs (ALCOHOL PREP) 70 % PADS        clobetasol (TEMOVATE) 0.05 % external cream Apply topically 2 times daily (Patient not taking: Reported on 6/1/2020) 80 g 1     fluconazole (DIFLUCAN) 150 MG tablet Take one tablet once a week (Patient not taking: Reported on 6/1/2020) 4 tablet 5     gabapentin (NEURONTIN) 300 MG capsule Take 1 tablet (300 mg) every night for 1-3 days, then 1 tablet twice daily for 1-3 days, then 1 tablet three times daily (Patient not taking: Reported on 4/27/2020) 90 capsule 0     lidocaine (XYLOCAINE) 5 % ointment Apply topically as needed for moderate pain (Patient not taking: Reported on 6/1/2020) 750 g 4     Allergies   Allergen Reactions     Celexa [Citalopram] Other (See Comments)     jittery     Lisinopril Cough     Recent Labs   Lab Test 02/26/20  1144 11/26/19  1124 08/12/19  1104   A1C 9.9* 7.5* 7.0*   LDL 77 71 67   HDL 49* 50 57   TRIG 78 66 77   ALT 44 42 29   CR 0.70 0.59 0.60   GFRESTIMATED >90 >90 >90   GFRESTBLACK >90 >90 >90   POTASSIUM 4.3 4.1 3.9   TSH 3.83 0.02* 1.62      BP Readings from Last 3 Encounters:   04/13/20 (!) 140/110   02/26/20 (!) 140/110   11/26/19 120/86    Wt Readings from Last 3 Encounters:   06/01/20 89.8 kg (198 lb)   02/26/20 90.4 kg (199 lb 3.2 oz)   11/26/19 92.6 kg (204 lb 3.2 oz)                    Reviewed and updated as needed this visit by Provider         Review of Systems   Constitutional, HEENT, cardiovascular, pulmonary, gi and gu systems are negative, except as otherwise noted.       Objective   Reported vitals:  Wt 89.8 kg (198 lb)   BMI 38.67 kg/m     alert and no distress  PSYCH: Alert and oriented times 3; coherent speech, normal   rate and volume, able  to articulate logical thoughts, able   to abstract reason, no tangential thoughts, no hallucinations   or delusions  Her affect is normal and pleasant  RESP: No cough, no audible wheezing, able to talk in full sentences  Remainder of exam unable to be completed due to telephone visits            Assessment/Plan:  1. Vitamin D deficiency  - vitamin D3 (CHOLECALCIFEROL) 2000 units (50 mcg) tablet; Take 2 tablets (4,000 Units) by mouth daily  Dispense: 180 tablet; Refill: 1    2. Mild recurrent major depression (H)  - venlafaxine (EFFEXOR-XR) 150 MG 24 hr capsule; Take 1 capsule (150 mg) by mouth daily  Dispense: 90 capsule; Refill: 1    3. Anxiety  Continue current plan  - venlafaxine (EFFEXOR-XR) 150 MG 24 hr capsule; Take 1 capsule (150 mg) by mouth daily  Dispense: 90 capsule; Refill: 1  - escitalopram (LEXAPRO) 20 MG tablet; Take 1 tablet (20 mg) by mouth daily  Dispense: 90 tablet; Refill: 1    4. Class 2 severe obesity with serious comorbidity and body mass index (BMI) of 38.0 to 38.9 in adult, unspecified obesity type (H)  [facility].   - COMPREHENSIVE WEIGHT MANAGEMENT    Return in about 1 month (around 7/1/2020) for diabetes, lipids, HTN, anxiety/depression.      Phone call duration:  15 minutes    Daja Obrien,   Certified Adult Nurse Practitioner  [phone]

## 2020-06-01 NOTE — PATIENT INSTRUCTIONS
Assessment/Plan:  1. Vitamin D deficiency  - vitamin D3 (CHOLECALCIFEROL) 2000 units (50 mcg) tablet; Take 2 tablets (4,000 Units) by mouth daily  Dispense: 180 tablet; Refill: 1    2. Mild recurrent major depression (H)  - venlafaxine (EFFEXOR-XR) 150 MG 24 hr capsule; Take 1 capsule (150 mg) by mouth daily  Dispense: 90 capsule; Refill: 1    3. Anxiety  Continue current plan  - venlafaxine (EFFEXOR-XR) 150 MG 24 hr capsule; Take 1 capsule (150 mg) by mouth daily  Dispense: 90 capsule; Refill: 1  - escitalopram (LEXAPRO) 20 MG tablet; Take 1 tablet (20 mg) by mouth daily  Dispense: 90 tablet; Refill: 1    4. Class 2 severe obesity with serious comorbidity and body mass index (BMI) of 38.0 to 38.9 in adult, unspecified obesity type (H)  [facility].   - COMPREHENSIVE WEIGHT MANAGEMENT    Return in about 1 month (around 7/1/2020) for diabetes, lipids, HTN, anxiety/depression.      Phone call duration:  15 minutes    Daja Obrien,   Certified Adult Nurse Practitioner  [phone]

## 2020-06-02 ASSESSMENT — ANXIETY QUESTIONNAIRES: GAD7 TOTAL SCORE: 8

## 2020-07-27 NOTE — PROGRESS NOTES
Subjective     Lili Nava is a 49 year old female who presents to clinic today for the following health issues:    HPI       Diabetes Follow-up    How often are you checking your blood sugar? Two times daily - 145 this morning  Blood sugar testing frequency justification:  Uncontrolled diabetes  What time of day are you checking your blood sugars (select all that apply)?  Before and after meals  Have you had any blood sugars above 200?  No  Have you had any blood sugars below 70?  No    What symptoms do you notice when your blood sugar is low?  Other: thirsty and tired    What concerns do you have today about your diabetes? None     Do you have any of these symptoms? (Select all that apply)  Numbness in feet, Burning in feet, Excessive thirst and Blurry vision  Lower extremities have been swelling up.  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      Have you had a diabetic eye exam in the last 12 months? No          Hyperlipidemia Follow-Up      Are you regularly taking any medication or supplement to lower your cholesterol?   Yes- zocor     Are you having muscle aches or other side effects that you think could be caused by your cholesterol lowering medication?  No    Hypertension Follow-up      Do you check your blood pressure regularly outside of the clinic? Yes     Are you following a low salt diet? Yes    Are your blood pressures ever more than 140 on the top number (systolic) OR more   than 90 on the bottom number (diastolic), for example 140/90? No    BP Readings from Last 2 Encounters:   07/28/20 110/76   04/13/20 (!) 140/110     Hemoglobin A1C (%)   Date Value   02/26/2020 9.9 (H)   11/26/2019 7.5 (H)     LDL Cholesterol Calculated (mg/dL)   Date Value   02/26/2020 77   11/26/2019 71       Depression and Anxiety Follow-Up    How are you doing with your depression since your last visit? No change    How are you doing with your anxiety since your  last visit?  No change    Are you having other symptoms that might be associated with depression or anxiety? No    Have you had a significant life event? No     Do you have any concerns with your use of alcohol or other drugs? No    Social History     Tobacco Use     Smoking status: Former Smoker     Types: Cigarettes     Last attempt to quit: 1988     Years since quittin.5     Smokeless tobacco: Never Used   Substance Use Topics     Alcohol use: Yes     Comment: rare     Drug use: No     PHQ 2020   PHQ-9 Total Score 24 14 13   Q9: Thoughts of better off dead/self-harm past 2 weeks Not at all Not at all Not at all   F/U: Thoughts of suicide or self-harm - - -   F/U: Safety concerns - - -     [place]-7 SCORE 2020   Total Score - - -   Total Score 21 8 8     Last PHQ-9 2020   1.  Little interest or pleasure in doing things 1   2.  Feeling down, depressed, or hopeless 1   3.  Trouble falling or staying asleep, or sleeping too much 2   4.  Feeling tired or having little energy 2   5.  Poor appetite or overeating 2   6.  Feeling bad about yourself 2   7.  Trouble concentrating 2   8.  Moving slowly or restless 1   Q9: Thoughts of better off dead/self-harm past 2 weeks 0   PHQ-9 Total Score 13   Difficulty at work, home, or with people Somewhat difficult   In the past two weeks have you had thoughts of suicide or self harm? -   Do you have concerns about your personal safety or the safety of others? -     [place]-7  2020   1. Feeling nervous, anxious, or on edge 2   2. Not being able to stop or control worrying 1   3. Worrying too much about different things 1   4. Trouble relaxing 1   5. Being so restless that it is hard to sit still 1   6. Becoming easily annoyed or irritable 1   7. Feeling afraid, as if something awful might happen 1   [place]-7 Total Score 8   If you checked any problems, how difficult have they made it for you to do your work, take care of things  at home, or get along with other people? Somewhat difficult       Suicide Assessment Five-step Evaluation and Treatment (SAFE-T)      How many servings of fruits and vegetables do you eat daily?  2-3    On average, how many sweetened beverages do you drink each day (Examples: soda, juice, sweet tea, etc.  Do NOT count diet or artificially sweetened beverages)?   0    How many days per week do you exercise enough to make your heart beat faster? 7    How many minutes a day do you exercise enough to make your heart beat faster? 60 or more    How many days per week do you miss taking your medication? 0    Concern - lower leg swelling   Onset: 2 weeks     Description:   Lower legs and feet     Intensity: moderate    Progression of Symptoms:  worsening    Accompanying Signs & Symptoms:  Redness that comes and goes     Previous history of similar problem:   no    Precipitating factors:   Worsened by: unknown     Alleviating factors:  Improved by: nothing.  Has been taking hydrochlorothiazide and wearing compression stockings.      Therapies Tried and outcome: elevation     Patient Active Problem List   Diagnosis     Type 2 diabetes mellitus with diabetic polyneuropathy, with long-term current use of insulin (H)     Benign essential hypertension     Anxiety     Mild recurrent major depression (H)     Post traumatic stress disorder     Diabetic neuropathy associated with type 2 diabetes mellitus (H)     Primary insomnia     Eczema     Vitamin D deficiency     Mixed incontinence urge and stress (male)(female)     Hyperlipidemia LDL goal <100     Obesity (BMI 35.0-39.9) with comorbidity (H)     Past Surgical History:   Procedure Laterality Date     COLONOSCOPY  2013     HYSTERECTOMY TOTAL ABDOMINAL N/A 2008    ovaries remain.        Social History     Tobacco Use     Smoking status: Former Smoker     Types: Cigarettes     Last attempt to quit: 1988     Years since quittin.5     Smokeless tobacco: Never  Used   Substance Use Topics     Alcohol use: Yes     Comment: rare     Family History   Problem Relation Age of Onset     Hypertension Mother      Depression Mother      Coronary Artery Disease Father          Current Outpatient Medications   Medication Sig Dispense Refill     Alcohol Swabs (ALCOHOL PREP) 70 % PADS        amLODIPine (NORVASC) 10 MG tablet Take 1 tablet (10 mg) by mouth daily 90 tablet 1     AquaLance Lancets 30G MISC        aspirin 81 MG EC tablet Take 1 tablet (81 mg) by mouth daily 90 tablet 3     blood glucose (NO BRAND SPECIFIED) lancets standard Use to test blood sugar twice daily or as directed. 100 each 11     blood glucose (NO BRAND SPECIFIED) test strip Use to test blood sugars 2 times daily or as directed 100 each 1     blood glucose monitoring (ONE TOUCH ULTRA MINI) meter device kit Use to test blood sugars 2 times daily or as directed. 1 kit 0     clobetasol (TEMOVATE) 0.05 % external cream Apply topically 2 times daily 80 g 1     escitalopram (LEXAPRO) 20 MG tablet Take 1 tablet (20 mg) by mouth daily 90 tablet 1     estradiol (VAGIFEM) 10 MCG TABS vaginal tablet Place 1 tablet (10 mcg) vaginally twice a week 8 tablet 3     exenatide ER (BYDUREON) 2 MG recon vial kit susp for weekly inj Inject 2 mg Subcutaneous once a week 4 kit 11     fluconazole (DIFLUCAN) 150 MG tablet Take one tablet once a week 4 tablet 5     furosemide (LASIX) 20 MG tablet Take 1 tablet (20 mg) by mouth daily 30 tablet 1     gabapentin (NEURONTIN) 300 MG capsule Take 1 tablet (300 mg) every night for 1-3 days, then 1 tablet twice daily for 1-3 days, then 1 tablet three times daily 90 capsule 0     hydrOXYzine (ATARAX) 25 MG tablet Take 1-2 tablets (25-50 mg) by mouth every 6 hours as needed for anxiety (or insomnia) 60 tablet 1     ibuprofen (ADVIL/MOTRIN) 800 MG tablet TAKE 1 TABLET BY MOUTH EVERY 8 HOURS AS NEEDED FOR MODERATE PAIN 90 tablet 0     insulin glargine (BASAGLAR KWIKPEN) 100 UNIT/ML pen Inject 60  Units Subcutaneous 2 times daily 30 mL 1     insulin pen needle (ULTICARE MINI) 31G X 6 MM miscellaneous USE 1 DAILY OR AS DIRECTED. 100 each 1     lidocaine (XYLOCAINE) 5 % ointment Apply topically as needed for moderate pain 750 g 4     losartan (COZAAR) 100 MG tablet TAKE 1 TABLET BY MOUTH DAILY 90 tablet 1     metFORMIN (GLUCOPHAGE-XR) 500 MG 24 hr tablet TAKE 1 TABLET (500MG) BY MOUTH DAILY (WITH DINNER)       metoprolol succinate ER (TOPROL-XL) 100 MG 24 hr tablet Take 1 tablet (100 mg) by mouth daily 90 tablet 1     ondansetron (ZOFRAN) 4 MG tablet Take 1 tablet (4 mg) by mouth 3 times daily as needed for nausea 60 tablet 1     order for [facility] Blood pressure monitor 1 each 0     oxybutynin ER (DITROPAN-XL) 10 MG 24 hr tablet Take 1 tablet (10 mg) by mouth daily 90 tablet 1     pioglitazone (ACTOS) 30 MG tablet Take 1 tablet (30 mg) by mouth daily 90 tablet 1     potassium chloride ER (KLOR-CON M) 20 MEQ CR tablet Take 1 tablet (20 mEq) by mouth daily 90 tablet 1     simvastatin (ZOCOR) 10 MG tablet Take 1 tablet (10 mg) by mouth At Bedtime 90 tablet 3     traZODone (DESYREL) 50 MG tablet Take 1-2 tablets ( mg) by mouth At Bedtime 60 tablet 1     venlafaxine (EFFEXOR-XR) 150 MG 24 hr capsule Take 1 capsule (150 mg) by mouth daily 90 capsule 1     vitamin D3 (CHOLECALCIFEROL) 2000 units (50 mcg) tablet Take 2 tablets (4,000 Units) by mouth daily 180 tablet 1     Allergies   Allergen Reactions     Celexa [Citalopram] Other (See Comments)     jittery     Lisinopril Cough     Recent Labs   Lab Test 02/26/20  1144 11/26/19  1124 08/12/19  1104   A1C 9.9* 7.5* 7.0*   LDL 77 71 67   HDL 49* 50 57   TRIG 78 66 77   ALT 44 42 29   CR 0.70 0.59 0.60   GFRESTIMATED >90 >90 >90   GFRESTBLACK >90 >90 >90   POTASSIUM 4.3 4.1 3.9   TSH 3.83 0.02* 1.62      BP Readings from Last 3 Encounters:   07/28/20 110/76   04/13/20 (!) 140/110   02/26/20 (!) 140/110    Wt Readings from Last 3 Encounters:   07/28/20 89.9 kg (198 lb  3.2 oz)   06/01/20 89.8 kg (198 lb)   02/26/20 90.4 kg (199 lb 3.2 oz)                    Reviewed and updated as needed this visit by Provider         Review of Systems   Constitutional, HEENT, cardiovascular, pulmonary, gi and gu systems are negative, except as otherwise noted.      Objective    /76 (BP Location: Left arm, Patient Position: Chair, Cuff Size: Adult Large)   Pulse 93   Temp 97.6  F (36.4  C) (Tympanic)   Resp 18   Ht 1.524 m (5')   Wt 89.9 kg (198 lb 3.2 oz)   SpO2 97%   BMI 38.71 kg/m    Body mass index is 38.71 kg/m .  Physical Exam   GENERAL: healthy, alert and no distress  NECK: no adenopathy, no asymmetry, masses, or scars, thyroid normal to palpation and no carotid bruits  RESP: lungs clear to auscultation - no rales, rhonchi or wheezes  CV: regular rate and rhythm, normal S1 S2, no S3 or S4, no murmur, click or rub, peripheral pulses strong  MS: lower extremity edema bilateral to mid calf.    PSYCH: mentation appears normal, affect normal/bright  Diabetic foot exam: normal DP and PT pulses, no trophic changes or ulcerative lesions and decreased sensory exam but edematous as above            Assessment & Plan     1. Type 2 diabetes mellitus with diabetic polyneuropathy, with long-term current use of insulin (H)  - Hemoglobin A1c  - Albumin Random Urine Quantitative with Creat Ratio  - C FOOT EXAM  NO CHARGE  Referral to [facility] for routine  eye exam.      2. Diabetic polyneuropathy associated with type 2 diabetes mellitus (H)    3. Hyperlipidemia LDL goal <100  - Lipid Profile    4. Benign essential hypertension  - Comprehensive metabolic panel  - TSH with free T4 reflex  - CBC with platelets differential    5. Mild recurrent major depression (H)  Continue current plan     6. On statin therapy  - Comprehensive metabolic panel    7. Bilateral lower extremity edema  - furosemide (LASIX) 20 MG tablet; Take 1 tablet (20 mg) by mouth daily  Dispense: 30 tablet; Refill:  1  - Echocardiogram Complete    8. Weight gain  - Echocardiogram Complete     9. Encounter for screening mammogram for breast cancer  - MA Screen Bilateral w/Cesar; Future        Return in about 3 months (around 10/28/2020) for diabetes, lipids, HTN.    Daja Obrien, NP  [facility] [place] [facility]

## 2020-07-28 ENCOUNTER — OFFICE VISIT (OUTPATIENT)
Dept: FAMILY MEDICINE | Facility: OTHER | Age: 50
End: 2020-07-28
Attending: NURSE PRACTITIONER
Payer: COMMERCIAL

## 2020-07-28 VITALS
WEIGHT: 198.2 LBS | SYSTOLIC BLOOD PRESSURE: 110 MMHG | DIASTOLIC BLOOD PRESSURE: 76 MMHG | OXYGEN SATURATION: 97 % | TEMPERATURE: 97.6 F | RESPIRATION RATE: 18 BRPM | HEIGHT: 60 IN | HEART RATE: 93 BPM | BODY MASS INDEX: 38.91 KG/M2

## 2020-07-28 DIAGNOSIS — Z79.4 TYPE 2 DIABETES MELLITUS WITH DIABETIC POLYNEUROPATHY, WITH LONG-TERM CURRENT USE OF INSULIN (H): Primary | ICD-10-CM

## 2020-07-28 DIAGNOSIS — E11.42 DIABETIC POLYNEUROPATHY ASSOCIATED WITH TYPE 2 DIABETES MELLITUS (H): ICD-10-CM

## 2020-07-28 DIAGNOSIS — Z12.31 ENCOUNTER FOR SCREENING MAMMOGRAM FOR BREAST CANCER: ICD-10-CM

## 2020-07-28 DIAGNOSIS — F33.0 MILD RECURRENT MAJOR DEPRESSION (H): ICD-10-CM

## 2020-07-28 DIAGNOSIS — Z01.00 EXAMINATION OF EYES AND VISION: ICD-10-CM

## 2020-07-28 DIAGNOSIS — Z79.899 ON STATIN THERAPY: ICD-10-CM

## 2020-07-28 DIAGNOSIS — R60.0 BILATERAL LOWER EXTREMITY EDEMA: ICD-10-CM

## 2020-07-28 DIAGNOSIS — R63.5 WEIGHT GAIN: ICD-10-CM

## 2020-07-28 DIAGNOSIS — E78.5 HYPERLIPIDEMIA LDL GOAL <100: ICD-10-CM

## 2020-07-28 DIAGNOSIS — E11.42 TYPE 2 DIABETES MELLITUS WITH DIABETIC POLYNEUROPATHY, WITH LONG-TERM CURRENT USE OF INSULIN (H): Primary | ICD-10-CM

## 2020-07-28 DIAGNOSIS — I10 BENIGN ESSENTIAL HYPERTENSION: ICD-10-CM

## 2020-07-28 LAB
ALBUMIN SERPL-MCNC: 3.9 G/DL (ref 3.4–5)
ALP SERPL-CCNC: 79 U/L (ref 40–150)
ALT SERPL W P-5'-P-CCNC: 34 U/L (ref 0–50)
ANION GAP SERPL CALCULATED.3IONS-SCNC: 7 MMOL/L (ref 3–14)
AST SERPL W P-5'-P-CCNC: 20 U/L (ref 0–45)
BASOPHILS # BLD AUTO: 0 10E9/L (ref 0–0.2)
BASOPHILS NFR BLD AUTO: 0.3 %
BILIRUB SERPL-MCNC: 0.5 MG/DL (ref 0.2–1.3)
BUN SERPL-MCNC: 20 MG/DL (ref 7–30)
CALCIUM SERPL-MCNC: 9.4 MG/DL (ref 8.5–10.1)
CHLORIDE SERPL-SCNC: 105 MMOL/L (ref 94–109)
CHOLEST SERPL-MCNC: 161 MG/DL
CO2 SERPL-SCNC: 26 MMOL/L (ref 20–32)
CREAT SERPL-MCNC: 0.59 MG/DL (ref 0.52–1.04)
CREAT UR-MCNC: 65 MG/DL
DIFFERENTIAL METHOD BLD: NORMAL
EOSINOPHIL # BLD AUTO: 0.1 10E9/L (ref 0–0.7)
EOSINOPHIL NFR BLD AUTO: 1.3 %
ERYTHROCYTE [DISTWIDTH] IN BLOOD BY AUTOMATED COUNT: 13 % (ref 10–15)
EST. AVERAGE GLUCOSE BLD GHB EST-MCNC: 146 MG/DL
GFR SERPL CREATININE-BSD FRML MDRD: >90 ML/MIN/{1.73_M2}
GLUCOSE SERPL-MCNC: 154 MG/DL (ref 70–99)
HBA1C MFR BLD: 6.7 % (ref 0–5.6)
HCT VFR BLD AUTO: 42.6 % (ref 35–47)
HDLC SERPL-MCNC: 58 MG/DL
HGB BLD-MCNC: 14.2 G/DL (ref 11.7–15.7)
LDLC SERPL CALC-MCNC: 89 MG/DL
LYMPHOCYTES # BLD AUTO: 1.7 10E9/L (ref 0.8–5.3)
LYMPHOCYTES NFR BLD AUTO: 19.4 %
MCH RBC QN AUTO: 30.1 PG (ref 26.5–33)
MCHC RBC AUTO-ENTMCNC: 33.3 G/DL (ref 31.5–36.5)
MCV RBC AUTO: 90 FL (ref 78–100)
MICROALBUMIN UR-MCNC: 15 MG/L
MICROALBUMIN/CREAT UR: 23.23 MG/G CR (ref 0–25)
MONOCYTES # BLD AUTO: 0.6 10E9/L (ref 0–1.3)
MONOCYTES NFR BLD AUTO: 6.7 %
NEUTROPHILS # BLD AUTO: 6.5 10E9/L (ref 1.6–8.3)
NEUTROPHILS NFR BLD AUTO: 72.3 %
NONHDLC SERPL-MCNC: 103 MG/DL
PLATELET # BLD AUTO: 394 10E9/L (ref 150–450)
POTASSIUM SERPL-SCNC: 4.2 MMOL/L (ref 3.4–5.3)
PROT SERPL-MCNC: 8.5 G/DL (ref 6.8–8.8)
RBC # BLD AUTO: 4.72 10E12/L (ref 3.8–5.2)
SODIUM SERPL-SCNC: 138 MMOL/L (ref 133–144)
TRIGL SERPL-MCNC: 68 MG/DL
TSH SERPL DL<=0.005 MIU/L-ACNC: 2.37 MU/L (ref 0.4–4)
WBC # BLD AUTO: 9 10E9/L (ref 4–11)

## 2020-07-28 PROCEDURE — 83036 HEMOGLOBIN GLYCOSYLATED A1C: CPT | Mod: ZL | Performed by: NURSE PRACTITIONER

## 2020-07-28 PROCEDURE — 99214 OFFICE O/P EST MOD 30 MIN: CPT | Performed by: NURSE PRACTITIONER

## 2020-07-28 PROCEDURE — G0463 HOSPITAL OUTPT CLINIC VISIT: HCPCS

## 2020-07-28 PROCEDURE — 80061 LIPID PANEL: CPT | Mod: ZL | Performed by: NURSE PRACTITIONER

## 2020-07-28 PROCEDURE — 40000788 ZZHCL STATISTIC ESTIMATED AVERAGE GLUCOSE: Mod: ZL | Performed by: NURSE PRACTITIONER

## 2020-07-28 PROCEDURE — 84443 ASSAY THYROID STIM HORMONE: CPT | Mod: ZL | Performed by: NURSE PRACTITIONER

## 2020-07-28 PROCEDURE — 85025 COMPLETE CBC W/AUTO DIFF WBC: CPT | Mod: ZL | Performed by: NURSE PRACTITIONER

## 2020-07-28 PROCEDURE — 36415 COLL VENOUS BLD VENIPUNCTURE: CPT | Mod: ZL | Performed by: NURSE PRACTITIONER

## 2020-07-28 PROCEDURE — 82043 UR ALBUMIN QUANTITATIVE: CPT | Mod: ZL | Performed by: NURSE PRACTITIONER

## 2020-07-28 PROCEDURE — 80053 COMPREHEN METABOLIC PANEL: CPT | Mod: ZL | Performed by: NURSE PRACTITIONER

## 2020-07-28 RX ORDER — FUROSEMIDE 20 MG
20 TABLET ORAL DAILY
Qty: 30 TABLET | Refills: 1 | Status: SHIPPED | OUTPATIENT
Start: 2020-07-28

## 2020-07-28 ASSESSMENT — ANXIETY QUESTIONNAIRES
2. NOT BEING ABLE TO STOP OR CONTROL WORRYING: SEVERAL DAYS
5. BEING SO RESTLESS THAT IT IS HARD TO SIT STILL: SEVERAL DAYS
4. TROUBLE RELAXING: SEVERAL DAYS
3. WORRYING TOO MUCH ABOUT DIFFERENT THINGS: SEVERAL DAYS
IF YOU CHECKED OFF ANY PROBLEMS ON THIS QUESTIONNAIRE, HOW DIFFICULT HAVE THESE PROBLEMS MADE IT FOR YOU TO DO YOUR WORK, TAKE CARE OF THINGS AT HOME, OR GET ALONG WITH OTHER PEOPLE: SOMEWHAT DIFFICULT
1. FEELING NERVOUS, ANXIOUS, OR ON EDGE: MORE THAN HALF THE DAYS
7. FEELING AFRAID AS IF SOMETHING AWFUL MIGHT HAPPEN: SEVERAL DAYS
6. BECOMING EASILY ANNOYED OR IRRITABLE: SEVERAL DAYS
GAD7 TOTAL SCORE: 8

## 2020-07-28 ASSESSMENT — PAIN SCALES - GENERAL: PAINLEVEL: MODERATE PAIN (5)

## 2020-07-28 ASSESSMENT — MIFFLIN-ST. JEOR: SCORE: 1445.53

## 2020-07-28 ASSESSMENT — PATIENT HEALTH QUESTIONNAIRE - PHQ9: SUM OF ALL RESPONSES TO PHQ QUESTIONS 1-9: 13

## 2020-07-28 NOTE — PATIENT INSTRUCTIONS
Assessment & Plan     1. Type 2 diabetes mellitus with diabetic polyneuropathy, with long-term current use of insulin (H)  - Hemoglobin A1c  - Albumin Random Urine Quantitative with Creat Ratio  - C FOOT EXAM  NO CHARGE  Referral to [facility] for routine  eye exam.      2. Diabetic polyneuropathy associated with type 2 diabetes mellitus (H)    3. Hyperlipidemia LDL goal <100  - Lipid Profile    4. Benign essential hypertension  - Comprehensive metabolic panel  - TSH with free T4 reflex  - CBC with platelets differential    5. Mild recurrent major depression (H)  Continue current plan     6. On statin therapy  - Comprehensive metabolic panel    7. Bilateral lower extremity edema  - furosemide (LASIX) 20 MG tablet; Take 1 tablet (20 mg) by mouth daily  Dispense: 30 tablet; Refill: 1  - Echocardiogram Complete    8. Weight gain  - Echocardiogram Complete         Return in about 3 months (around 10/28/2020) for diabetes, lipids, HTN.    Daja Obrien NP  [facility]

## 2020-07-28 NOTE — NURSING NOTE
Chief Complaint   Patient presents with     Hypertension     Diabetes     Lipids     Leg Swelling       Initial /76 (BP Location: Left arm, Patient Position: Chair, Cuff Size: Adult Large)   Pulse 93   Temp 97.6  F (36.4  C) (Tympanic)   Resp 18   Ht 1.524 m (5')   Wt 89.9 kg (198 lb 3.2 oz)   SpO2 97%   BMI 38.71 kg/m   Estimated body mass index is 38.71 kg/m  as calculated from the following:    Height as of this encounter: 1.524 m (5').    Weight as of this encounter: 89.9 kg (198 lb 3.2 oz).  Medication Reconciliation: complete  Pamela M. Lechevalier, LPN

## 2020-07-29 DIAGNOSIS — E11.9 TYPE 2 DIABETES MELLITUS WITHOUT COMPLICATION, WITH LONG-TERM CURRENT USE OF INSULIN (H): ICD-10-CM

## 2020-07-29 DIAGNOSIS — Z79.4 TYPE 2 DIABETES MELLITUS WITHOUT COMPLICATION, WITH LONG-TERM CURRENT USE OF INSULIN (H): ICD-10-CM

## 2020-07-29 RX ORDER — INSULIN GLARGINE 100 [IU]/ML
INJECTION, SOLUTION SUBCUTANEOUS
Qty: 60 ML | Refills: 1 | Status: SHIPPED | OUTPATIENT
Start: 2020-07-29

## 2020-07-29 ASSESSMENT — ANXIETY QUESTIONNAIRES: GAD7 TOTAL SCORE: 8

## 2020-08-07 NOTE — PROGRESS NOTES
"Lili Nava is a 49 year old female who is being evaluated via a billable telephone visit.      The patient has been notified of following:     \"This telephone visit will be conducted via a call between you and your physician/provider. We have found that certain health care needs can be provided without the need for a physical exam.  This service lets us provide the care you need with a short phone conversation.  If a prescription is necessary we can send it directly to your pharmacy.  If lab work is needed we can place an order for that and you can then stop by our lab to have the test done at a later time.    Telephone visits are billed at different rates depending on your insurance coverage. During this emergency period, for some insurers they may be billed the same as an in-person visit.  Please reach out to your insurance provider with any questions.    If during the course of the call the physician/provider feels a telephone visit is not appropriate, you will not be charged for this service.\"    Patient has given verbal consent for Telephone visit?  Yes    What phone number would you like to be contacted at? [phone]    How would you like to obtain your AVS? Rosahart    Subjective     Lili Nava is a 49 year old female who presents via phone visit today for the following health issues:    HPI    Concern - leg swelling follow up   Onset: on and off last few months     Description:   Red and blotchy yesterday fading today swelling gone down     Intensity: mild    Progression of Symptoms:  Improving since starting lasix    Accompanying Signs & Symptoms:  None     Previous history of similar problem:   None     Precipitating factors:   Worsened by: standing on feet all day     Alleviating factors:  Improved by: lasix     Therapies Tried and outcome: lasix, compression stockings but needs a new pair.      Pt would like a script for a back brace having a hard time at work with back         Patient " Active Problem List   Diagnosis     Type 2 diabetes mellitus with diabetic polyneuropathy, with long-term current use of insulin (H)     Benign essential hypertension     Anxiety     Mild recurrent major depression (H)     Post traumatic stress disorder     Diabetic neuropathy associated with type 2 diabetes mellitus (H)     Primary insomnia     Eczema     Vitamin D deficiency     Mixed incontinence urge and stress (male)(female)     Hyperlipidemia LDL goal <100     Obesity (BMI 35.0-39.9) with comorbidity (H)     Past Surgical History:   Procedure Laterality Date     COLONOSCOPY  2013     HYSTERECTOMY TOTAL ABDOMINAL N/A 2008    ovaries remain.        Social History     Tobacco Use     Smoking status: Former Smoker     Types: Cigarettes     Last attempt to quit: 1988     Years since quittin.6     Smokeless tobacco: Never Used   Substance Use Topics     Alcohol use: Yes     Comment: rare     Family History   Problem Relation Age of Onset     Hypertension Mother      Depression Mother      Coronary Artery Disease Father          Current Outpatient Medications   Medication Sig Dispense Refill     Alcohol Swabs (ALCOHOL PREP) 70 % PADS        amLODIPine (NORVASC) 10 MG tablet Take 1 tablet (10 mg) by mouth daily 90 tablet 1     AquaLance Lancets 30G MISC        aspirin 81 MG EC tablet Take 1 tablet (81 mg) by mouth daily 90 tablet 3     blood glucose (NO BRAND SPECIFIED) lancets standard Use to test blood sugar twice daily or as directed. 100 each 11     blood glucose (NO BRAND SPECIFIED) test strip Use to test blood sugars 2 times daily or as directed 100 each 1     blood glucose monitoring (ONE TOUCH ULTRA MINI) meter device kit Use to test blood sugars 2 times daily or as directed. 1 kit 0     clobetasol (TEMOVATE) 0.05 % external cream Apply topically 2 times daily 80 g 1     escitalopram (LEXAPRO) 20 MG tablet Take 1 tablet (20 mg) by mouth daily 90 tablet 1     estradiol (VAGIFEM) 10 MCG TABS  vaginal tablet Place 1 tablet (10 mcg) vaginally twice a week 8 tablet 3     fluconazole (DIFLUCAN) 150 MG tablet Take one tablet once a week 4 tablet 5     furosemide (LASIX) 20 MG tablet Take 1 tablet (20 mg) by mouth daily 30 tablet 1     gabapentin (NEURONTIN) 300 MG capsule Take 1 tablet (300 mg) every night for 1-3 days, then 1 tablet twice daily for 1-3 days, then 1 tablet three times daily 90 capsule 0     hydrOXYzine (ATARAX) 25 MG tablet Take 1-2 tablets (25-50 mg) by mouth every 6 hours as needed for anxiety (or insomnia) 60 tablet 1     ibuprofen (ADVIL/MOTRIN) 800 MG tablet TAKE 1 TABLET BY MOUTH EVERY 8 HOURS AS NEEDED FOR MODERATE PAIN 90 tablet 0     insulin pen needle (ULTICARE MINI) 31G X 6 MM miscellaneous USE 1 DAILY OR AS DIRECTED. 100 each 1     LANTUS SOLOSTAR 100 UNIT/ML soln INJECT 60 UNITS SUBCUTANEOUS 2 TIMES DAILY 60 mL 1     lidocaine (XYLOCAINE) 5 % ointment Apply topically as needed for moderate pain 750 g 4     losartan (COZAAR) 100 MG tablet TAKE 1 TABLET BY MOUTH DAILY 90 tablet 1     metFORMIN (GLUCOPHAGE-XR) 500 MG 24 hr tablet TAKE 1 TABLET (500MG) BY MOUTH DAILY (WITH DINNER)       metoprolol succinate ER (TOPROL-XL) 100 MG 24 hr tablet Take 1 tablet (100 mg) by mouth daily 90 tablet 1     ondansetron (ZOFRAN) 4 MG tablet Take 1 tablet (4 mg) by mouth 3 times daily as needed for nausea 60 tablet 1     order for DME Blood pressure monitor 1 each 0     oxybutynin ER (DITROPAN-XL) 10 MG 24 hr tablet Take 1 tablet (10 mg) by mouth daily 90 tablet 1     pioglitazone (ACTOS) 30 MG tablet Take 1 tablet (30 mg) by mouth daily 90 tablet 1     potassium chloride ER (KLOR-CON M) 20 MEQ CR tablet Take 1 tablet (20 mEq) by mouth daily 90 tablet 1     simvastatin (ZOCOR) 10 MG tablet Take 1 tablet (10 mg) by mouth At Bedtime 90 tablet 3     traZODone (DESYREL) 50 MG tablet Take 1-2 tablets ( mg) by mouth At Bedtime 60 tablet 1     venlafaxine (EFFEXOR-XR) 150 MG 24 hr capsule Take 1  capsule (150 mg) by mouth daily 90 capsule 1     vitamin D3 (CHOLECALCIFEROL) 2000 units (50 mcg) tablet Take 2 tablets (4,000 Units) by mouth daily 180 tablet 1     exenatide ER (BYDUREON) 2 MG recon vial kit susp for weekly inj Inject 2 mg Subcutaneous once a week (Patient not taking: Reported on 8/11/2020) 4 kit 11     Allergies   Allergen Reactions     Celexa [Citalopram] Other (See Comments)     jittery     Lisinopril Cough     Recent Labs   Lab Test 07/28/20  1216 02/26/20  1144 11/26/19  1124   A1C 6.7* 9.9* 7.5*   LDL 89 77 71   HDL 58 49* 50   TRIG 68 78 66   ALT 34 44 42   CR 0.59 0.70 0.59   GFRESTIMATED >90 >90 >90   GFRESTBLACK >90 >90 >90   POTASSIUM 4.2 4.3 4.1   TSH 2.37 3.83 0.02*      BP Readings from Last 3 Encounters:   07/28/20 110/76   04/13/20 (!) 140/110   02/26/20 (!) 140/110    Wt Readings from Last 3 Encounters:   08/11/20 89.8 kg (198 lb)   07/28/20 89.9 kg (198 lb 3.2 oz)   06/01/20 89.8 kg (198 lb)                    Reviewed and updated as needed this visit by Provider         Review of Systems   Constitutional, HEENT, cardiovascular, pulmonary, gi and gu systems are negative, except as otherwise noted.       Objective   Reported vitals:  Ht 1.524 m (5')   Wt 89.8 kg (198 lb)   BMI 38.67 kg/m     alert and no distress  PSYCH: Alert and oriented times 3; coherent speech, normal   rate and volume, able to articulate logical thoughts, able   to abstract reason, no tangential thoughts, no hallucinations   or delusions  Her affect is normal and pleasant  RESP: No cough, no audible wheezing, able to talk in full sentences  Remainder of exam unable to be completed due to telephone visits            Assessment/Plan:    1. Chronic bilateral low back pain without sciatica  Back brace  - Miscellaneous Order for DME - ONLY FOR DME    2. Bilateral lower extremity edema  Continue lasix  Compression stockings ordered.        Follow-up in October as scheduled    Phone call duration:  8  minutes    Daja Obrien,   Certified Adult Nurse Practitioner  [phone]

## 2020-08-11 ENCOUNTER — VIRTUAL VISIT (OUTPATIENT)
Dept: FAMILY MEDICINE | Facility: OTHER | Age: 50
End: 2020-08-11
Attending: NURSE PRACTITIONER
Payer: COMMERCIAL

## 2020-08-11 VITALS — HEIGHT: 60 IN | BODY MASS INDEX: 38.87 KG/M2 | WEIGHT: 198 LBS

## 2020-08-11 DIAGNOSIS — M54.50 CHRONIC BILATERAL LOW BACK PAIN WITHOUT SCIATICA: Primary | ICD-10-CM

## 2020-08-11 DIAGNOSIS — Z79.4 TYPE 2 DIABETES MELLITUS WITH DIABETIC POLYNEUROPATHY, WITH LONG-TERM CURRENT USE OF INSULIN (H): Primary | ICD-10-CM

## 2020-08-11 DIAGNOSIS — R60.0 BILATERAL LOWER EXTREMITY EDEMA: ICD-10-CM

## 2020-08-11 DIAGNOSIS — E11.42 TYPE 2 DIABETES MELLITUS WITH DIABETIC POLYNEUROPATHY, WITH LONG-TERM CURRENT USE OF INSULIN (H): Primary | ICD-10-CM

## 2020-08-11 DIAGNOSIS — G89.29 CHRONIC BILATERAL LOW BACK PAIN WITHOUT SCIATICA: Primary | ICD-10-CM

## 2020-08-11 PROCEDURE — 99213 OFFICE O/P EST LOW 20 MIN: CPT | Mod: 95 | Performed by: NURSE PRACTITIONER

## 2020-08-11 ASSESSMENT — MIFFLIN-ST. JEOR: SCORE: 1444.62

## 2020-08-11 NOTE — NURSING NOTE
Chief Complaint   Patient presents with     Leg Swelling       Initial Ht 1.524 m (5')   Wt 89.8 kg (198 lb)   BMI 38.67 kg/m   Estimated body mass index is 38.67 kg/m  as calculated from the following:    Height as of this encounter: 1.524 m (5').    Weight as of this encounter: 89.8 kg (198 lb).  Medication Reconciliation: complete  Pamela M. Lechevalier, LPN

## 2020-08-11 NOTE — TELEPHONE ENCOUNTER
Received a fax from pharmacy. Bydureon has been discontinue by [facility]. Patient needs alternative.    No alternatives given  NEIL NEWSOME LPN

## 2020-08-11 NOTE — TELEPHONE ENCOUNTER
Please call pharmacy, did they discontinue the pen type and need a different delivery device or remove bydureon from her formulary.

## 2020-09-01 ENCOUNTER — ANCILLARY PROCEDURE (OUTPATIENT)
Dept: MAMMOGRAPHY | Facility: OTHER | Age: 50
End: 2020-09-01
Attending: NURSE PRACTITIONER
Payer: COMMERCIAL

## 2020-09-01 ENCOUNTER — HOSPITAL ENCOUNTER (OUTPATIENT)
Dept: CARDIOLOGY | Facility: HOSPITAL | Age: 50
End: 2020-09-01
Attending: NURSE PRACTITIONER
Payer: COMMERCIAL

## 2020-09-01 DIAGNOSIS — Z12.31 ENCOUNTER FOR SCREENING MAMMOGRAM FOR BREAST CANCER: ICD-10-CM

## 2020-09-01 PROCEDURE — 93306 TTE W/DOPPLER COMPLETE: CPT | Mod: TC

## 2020-09-01 PROCEDURE — 93306 TTE W/DOPPLER COMPLETE: CPT | Mod: 26 | Performed by: INTERNAL MEDICINE

## 2020-09-01 PROCEDURE — 77067 SCR MAMMO BI INCL CAD: CPT | Mod: TC

## 2020-09-26 DIAGNOSIS — F51.01 PRIMARY INSOMNIA: ICD-10-CM

## 2020-09-26 DIAGNOSIS — G44.209 TENSION HEADACHE: ICD-10-CM

## 2020-09-28 RX ORDER — IBUPROFEN 800 MG/1
TABLET, FILM COATED ORAL
Qty: 90 TABLET | Refills: 0 | Status: SHIPPED | OUTPATIENT
Start: 2020-09-28 | End: 2021-01-04

## 2020-09-28 RX ORDER — TRAZODONE HYDROCHLORIDE 50 MG/1
50-100 TABLET, FILM COATED ORAL AT BEDTIME
Qty: 60 TABLET | Refills: 1 | Status: SHIPPED | OUTPATIENT
Start: 2020-09-28 | End: 2021-04-07

## 2020-09-28 NOTE — TELEPHONE ENCOUNTER
Trazodone  Last Written Prescription Date: 3/30/20  Last Fill Quantity: 60 # of Refills: 1  Last Office Visit: 8/11/20    Ibuprofen  Last Written Prescription Date: 5/11/20  Last Fill Quantity: 90 # of Refills: 0  Last Office Visit: 8/11/20

## 2020-10-24 DIAGNOSIS — E11.42 TYPE 2 DIABETES MELLITUS WITH DIABETIC POLYNEUROPATHY, WITH LONG-TERM CURRENT USE OF INSULIN (H): ICD-10-CM

## 2020-10-24 DIAGNOSIS — Z79.4 TYPE 2 DIABETES MELLITUS WITH DIABETIC POLYNEUROPATHY, WITH LONG-TERM CURRENT USE OF INSULIN (H): ICD-10-CM

## 2020-10-24 DIAGNOSIS — I10 BENIGN ESSENTIAL HYPERTENSION: ICD-10-CM

## 2020-10-27 RX ORDER — AMLODIPINE BESYLATE 10 MG/1
TABLET ORAL
Qty: 90 TABLET | Refills: 1 | Status: SHIPPED | OUTPATIENT
Start: 2020-10-27 | End: 2021-05-14

## 2020-10-27 RX ORDER — PIOGLITAZONEHYDROCHLORIDE 30 MG/1
TABLET ORAL
Qty: 90 TABLET | Refills: 1 | Status: SHIPPED | OUTPATIENT
Start: 2020-10-27 | End: 2021-05-14

## 2020-10-27 RX ORDER — LOSARTAN POTASSIUM 100 MG/1
TABLET ORAL
Qty: 30 TABLET | Refills: 5 | Status: SHIPPED | OUTPATIENT
Start: 2020-10-27 | End: 2021-05-14

## 2020-10-27 NOTE — TELEPHONE ENCOUNTER
Cozaar      Last Written Prescription Date:  03/02/20  Last Fill Quantity: 90,   # refills: 1  Last Office Visit: 08/11/20  Future Office visit:    Next 5 appointments (look out 90 days)    Oct 29, 2020 10:00 AM  (Arrive by 9:45 AM)  SHORT with Daja Obrien NP  [facility] ([facility] ) [address]  [place] [address]  [phone]           Norvasc      Last Written Prescription Date:  02/26/20  Last Fill Quantity: 90,   # refills: 1  Last Office Visit: 08/11/20  Future Office visit:    Next 5 appointments (look out 90 days)    Oct 29, 2020 10:00 AM  (Arrive by 9:45 AM)  SHORT with Daja Obrien NP  [facility] ([facility] ) [address]  [place] [address]  [phone]             Actos      Last Written Prescription Date:  04/13/20  Last Fill Quantity: 90,   # refills: 1  Last Office Visit: 08/11/20  Future Office visit:    Next 5 appointments (look out 90 days)    Oct 29, 2020 10:00 AM  (Arrive by 9:45 AM)  SHORT with Daja Obrien NP  [facility] ([facility] ) [address]  [place] [address]  [phone]

## 2021-01-03 DIAGNOSIS — G44.209 TENSION HEADACHE: ICD-10-CM

## 2021-01-03 NOTE — TELEPHONE ENCOUNTER
Ibuprofen 800  mg      Last Written Prescription Date:  9-  Last Fill Quantity: 90,   # refills: 0  Last Office Visit: 8-

## 2021-01-04 RX ORDER — IBUPROFEN 800 MG/1
TABLET, FILM COATED ORAL
Qty: 90 TABLET | Refills: 0 | Status: SHIPPED | OUTPATIENT
Start: 2021-01-04 | End: 2021-02-22

## 2021-01-13 ENCOUNTER — TELEPHONE (OUTPATIENT)
Dept: FAMILY MEDICINE | Facility: OTHER | Age: 51
End: 2021-01-13

## 2021-01-13 NOTE — TELEPHONE ENCOUNTER
Received a fax from unknown pharmacy requesting Lidocaine cream.Unsure if this is a legitimate request.Will discuss with pt with call back.      Vilma Bourne RN

## 2021-02-17 ENCOUNTER — TRANSFERRED RECORDS (OUTPATIENT)
Dept: HEALTH INFORMATION MANAGEMENT | Facility: CLINIC | Age: 51
End: 2021-02-17

## 2021-02-21 DIAGNOSIS — G44.209 TENSION HEADACHE: ICD-10-CM

## 2021-02-22 RX ORDER — IBUPROFEN 800 MG/1
TABLET, FILM COATED ORAL
Qty: 90 TABLET | Refills: 0 | Status: SHIPPED | OUTPATIENT
Start: 2021-02-22 | End: 2021-04-07

## 2021-03-03 ENCOUNTER — OFFICE VISIT (OUTPATIENT)
Dept: INTERNAL MEDICINE | Facility: OTHER | Age: 51
End: 2021-03-03
Attending: NURSE PRACTITIONER
Payer: COMMERCIAL

## 2021-03-03 ENCOUNTER — NURSE TRIAGE (OUTPATIENT)
Dept: FAMILY MEDICINE | Facility: OTHER | Age: 51
End: 2021-03-03

## 2021-03-03 VITALS
DIASTOLIC BLOOD PRESSURE: 82 MMHG | HEART RATE: 111 BPM | TEMPERATURE: 98.9 F | OXYGEN SATURATION: 96 % | SYSTOLIC BLOOD PRESSURE: 128 MMHG

## 2021-03-03 DIAGNOSIS — Z11.52 ENCOUNTER FOR SCREENING FOR COVID-19: Primary | ICD-10-CM

## 2021-03-03 DIAGNOSIS — R05.9 COUGH: ICD-10-CM

## 2021-03-03 DIAGNOSIS — R35.89 POLYURIA: ICD-10-CM

## 2021-03-03 PROCEDURE — U0003 INFECTIOUS AGENT DETECTION BY NUCLEIC ACID (DNA OR RNA); SEVERE ACUTE RESPIRATORY SYNDROME CORONAVIRUS 2 (SARS-COV-2) (CORONAVIRUS DISEASE [COVID-19]), AMPLIFIED PROBE TECHNIQUE, MAKING USE OF HIGH THROUGHPUT TECHNOLOGIES AS DESCRIBED BY CMS-2020-01-R: HCPCS | Mod: ZL | Performed by: INTERNAL MEDICINE

## 2021-03-03 PROCEDURE — G0463 HOSPITAL OUTPT CLINIC VISIT: HCPCS

## 2021-03-03 PROCEDURE — 99213 OFFICE O/P EST LOW 20 MIN: CPT | Performed by: INTERNAL MEDICINE

## 2021-03-03 PROCEDURE — U0005 INFEC AGEN DETEC AMPLI PROBE: HCPCS | Mod: ZL | Performed by: INTERNAL MEDICINE

## 2021-03-03 RX ORDER — LEVOFLOXACIN 500 MG/1
500 TABLET, FILM COATED ORAL DAILY
Qty: 7 TABLET | Refills: 0 | Status: SHIPPED | OUTPATIENT
Start: 2021-03-03

## 2021-03-03 ASSESSMENT — PAIN SCALES - GENERAL: PAINLEVEL: NO PAIN (0)

## 2021-03-03 NOTE — PROGRESS NOTES
Assessment & Plan   Problem List Items Addressed This Visit     None      Visit Diagnoses     Encounter for screening for COVID-19    -  Primary    Relevant Orders    Symptomatic COVID-19 Virus (Coronavirus) by PCR (Completed)    Polyuria        Relevant Medications    levofloxacin (LEVAQUIN) 500 MG tablet    Cough        Relevant Medications    levofloxacin (LEVAQUIN) 500 MG tablet             20 minutes spent on the date of the encounter doing chart review, review of test results, interpretation of tests, patient visit and documentation        BMI:   Estimated body mass index is 38.67 kg/m  as calculated from the following:    Height as of 8/11/20: 1.524 m (5').    Weight as of 8/11/20: 89.8 kg (198 lb).       Jonathan Bond, [facility] [place] SILVIA Pearson is a 50 year old who presents for the following health issues     HPI   Lili presents today reporting numerous symptoms x 3 weeks.  She was tested for Covid on 2/17/21 four days after symptoms began and she was negative.  She reports sinus congestion, cough, sore throat, head ache along with fatigue, dysuria and polyuria.  No fevers are reported and taste and smell are intact.       Acute Illness  Acute illness concerns: URI  Onset/Duration: 3 weeks  Symptoms:  Fever: no  Chills/Sweats: no  Headache (location?): YES  Sinus Pressure: YES  Conjunctivitis:  YES  Ear Pain: no  Rhinorrhea: YES  Congestion: YES  Sore Throat: YES  Cough: YES  Wheeze: YES  Decreased Appetite: YES  Nausea: no  Vomiting: no  Diarrhea: no  Dysuria/Freq.: YES  Dysuria or Hematuria: no  Fatigue/Achiness: YES  Sick/Strep Exposure: no  Therapies tried and outcome: zycam      Review of Systems   Constitutional, HEENT, cardiovascular, pulmonary, gi and gu systems are negative, except as otherwise noted.      Objective    There were no vitals taken for this visit.  There is no height or weight on file to calculate BMI.  Physical Exam   GENERAL: healthy,  alert and no distress  EYES: Eyes grossly normal to inspection, PERRL and conjunctivae and sclerae normal  HENT: ear canals and TM's normal, nose and mouth without ulcers or lesions  NECK: no adenopathy, no asymmetry, masses, or scars and thyroid normal to palpation  RESP: Rhonchi and crackles in right base.  No wheezes, upper fields clear.  CV: regular rate and rhythm, normal S1 S2, no S3 or S4, no murmur, click or rub, no peripheral edema and peripheral pulses strong  MS: no gross musculoskeletal defects noted, no edema  SKIN: no suspicious lesions or rashes  PSYCH: mentation appears normal, affect normal/bright    Office Visit on 07/28/2020   Component Date Value Ref Range Status     Hemoglobin A1C 07/28/2020 6.7* 0 - 5.6 % Final    Comment: Normal <5.7% Prediabetes 5.7-6.4%  Diabetes 6.5% or higher - adopted from ADA   consensus guidelines.       Cholesterol 07/28/2020 161  <200 mg/dL Final     Triglycerides 07/28/2020 68  <150 mg/dL Final    Non Fasting     HDL Cholesterol 07/28/2020 58  >49 mg/dL Final     LDL Cholesterol Calculated 07/28/2020 89  <100 mg/dL Final    Desirable:       <100 mg/dl     Non HDL Cholesterol 07/28/2020 103  <130 mg/dL Final     Creatinine Urine 07/28/2020 65  mg/dL Final     Albumin Urine mg/L 07/28/2020 15  mg/L Final     Albumin Urine mg/g Cr 07/28/2020 23.23  0 - 25 mg/g Cr Final     Sodium 07/28/2020 138  133 - 144 mmol/L Final     Potassium 07/28/2020 4.2  3.4 - 5.3 mmol/L Final     Chloride 07/28/2020 105  94 - 109 mmol/L Final     Carbon Dioxide 07/28/2020 26  20 - 32 mmol/L Final     Anion Gap 07/28/2020 7  3 - 14 mmol/L Final     Glucose 07/28/2020 154* 70 - 99 mg/dL Final    Non Fasting     Urea Nitrogen 07/28/2020 20  7 - 30 mg/dL Final     Creatinine 07/28/2020 0.59  0.52 - 1.04 mg/dL Final     GFR Estimate 07/28/2020 >90  >60 mL/min/[1.73_m2] Final    Comment: Non  GFR Calc  Starting 12/18/2018, serum creatinine based estimated GFR (eGFR) will be    calculated using the Chronic Kidney Disease Epidemiology Collaboration   (CKD-EPI) equation.       GFR Estimate If Black 07/28/2020 >90  >60 mL/min/[1.73_m2] Final    Comment:  GFR Calc  Starting 12/18/2018, serum creatinine based estimated GFR (eGFR) will be   calculated using the Chronic Kidney Disease Epidemiology Collaboration   (CKD-EPI) equation.       Calcium 07/28/2020 9.4  8.5 - 10.1 mg/dL Final     Bilirubin Total 07/28/2020 0.5  0.2 - 1.3 mg/dL Final     Albumin 07/28/2020 3.9  3.4 - 5.0 g/dL Final     Protein Total 07/28/2020 8.5  6.8 - 8.8 g/dL Final     Alkaline Phosphatase 07/28/2020 79  40 - 150 U/L Final     ALT 07/28/2020 34  0 - 50 U/L Final     AST 07/28/2020 20  0 - 45 U/L Final     TSH 07/28/2020 2.37  0.40 - 4.00 mU/L Final     WBC 07/28/2020 9.0  4.0 - 11.0 10e9/L Final     RBC Count 07/28/2020 4.72  3.8 - 5.2 10e12/L Final     Hemoglobin 07/28/2020 14.2  11.7 - 15.7 g/dL Final     Hematocrit 07/28/2020 42.6  35.0 - 47.0 % Final     MCV 07/28/2020 90  78 - 100 fl Final     MCH 07/28/2020 30.1  26.5 - 33.0 pg Final     MCHC 07/28/2020 33.3  31.5 - 36.5 g/dL Final     RDW 07/28/2020 13.0  10.0 - 15.0 % Final     Platelet Count 07/28/2020 394  150 - 450 10e9/L Final     % Neutrophils 07/28/2020 72.3  % Final     % Lymphocytes 07/28/2020 19.4  % Final     % Monocytes 07/28/2020 6.7  % Final     % Eosinophils 07/28/2020 1.3  % Final     % Basophils 07/28/2020 0.3  % Final     Absolute Neutrophil 07/28/2020 6.5  1.6 - 8.3 10e9/L Final     Absolute Lymphocytes 07/28/2020 1.7  0.8 - 5.3 10e9/L Final     Absolute Monocytes 07/28/2020 0.6  0.0 - 1.3 10e9/L Final     Absolute Eosinophils 07/28/2020 0.1  0.0 - 0.7 10e9/L Final     Absolute Basophils 07/28/2020 0.0  0.0 - 0.2 10e9/L Final     Diff Method 07/28/2020 Automated Method   Final     Estimated Average Glucose 07/28/2020 146  mg/dL Final     No results found for any visits on 03/03/21.  No results found for this or any previous  visit (from the past 24 hour(s)).

## 2021-03-03 NOTE — TELEPHONE ENCOUNTER
Next 5 appointments (look out 90 days)    Mar 03, 2021  4:30 PM  (Arrive by 4:00 PM)  SHORT with Jonathan Bond DO  [facility] [address]  [place] [address] [phone]   Mar 15, 2021  1:30 PM  (Arrive by 1:15 PM)  SHORT with Daja Obrien NP  [facility] [address]  [place] [address]  [phone]        Pt called schedule.  Pt agrees to plan.

## 2021-03-03 NOTE — NURSING NOTE
Chief Complaint   Patient presents with     URI       Initial /82 (BP Location: Right arm, Patient Position: Chair, Cuff Size: Adult Large)   Pulse 111   Temp 98.9  F (37.2  C) (Tympanic)   SpO2 96%  Estimated body mass index is 38.67 kg/m  as calculated from the following:    Height as of 8/11/20: 1.524 m (5').    Weight as of 8/11/20: 89.8 kg (198 lb).  Medication Reconciliation: complete  NEIL NEWSOME LPN

## 2021-03-04 LAB
SARS-COV-2 RNA RESP QL NAA+PROBE: NORMAL
SPECIMEN SOURCE: NORMAL

## 2021-03-05 LAB
LABORATORY COMMENT REPORT: NORMAL
SARS-COV-2 RNA RESP QL NAA+PROBE: NEGATIVE
SPECIMEN SOURCE: NORMAL

## 2021-03-11 NOTE — NURSING NOTE
Chief Complaint   Patient presents with     Hypertension     Diabetes     Lipids       Initial BP (!) 140/110 (BP Location: Left arm, Patient Position: Chair, Cuff Size: Adult Large)   Pulse 88   Resp 18   Ht 1.524 m (5')   Wt 90.4 kg (199 lb 3.2 oz)   SpO2 96%   BMI 38.90 kg/m   Estimated body mass index is 38.9 kg/m  as calculated from the following:    Height as of this encounter: 1.524 m (5').    Weight as of this encounter: 90.4 kg (199 lb 3.2 oz).  Medication Reconciliation: complete  Pamela M. Lechevalier, LPN     done

## 2021-04-06 DIAGNOSIS — G44.209 TENSION HEADACHE: ICD-10-CM

## 2021-04-06 DIAGNOSIS — F51.01 PRIMARY INSOMNIA: ICD-10-CM

## 2021-04-07 RX ORDER — TRAZODONE HYDROCHLORIDE 50 MG/1
50-100 TABLET, FILM COATED ORAL AT BEDTIME
Qty: 60 TABLET | Refills: 1 | Status: SHIPPED | OUTPATIENT
Start: 2021-04-07

## 2021-04-07 RX ORDER — IBUPROFEN 800 MG/1
TABLET, FILM COATED ORAL
Qty: 90 TABLET | Refills: 0 | Status: SHIPPED | OUTPATIENT
Start: 2021-04-07 | End: 2021-06-14

## 2021-04-07 NOTE — TELEPHONE ENCOUNTER
Ibuprofen 800 mg  Last Visit 8/11/20  Last Fill 2/22/21  Next Visit not scheduled      Trazodone 50 mg  Last office visit: 8/11/20  Last refill: 9/28/20    Thank you.

## 2021-05-06 ENCOUNTER — TRANSFERRED RECORDS (OUTPATIENT)
Dept: HEALTH INFORMATION MANAGEMENT | Facility: CLINIC | Age: 51
End: 2021-05-06

## 2021-05-13 DIAGNOSIS — Z79.4 TYPE 2 DIABETES MELLITUS WITH DIABETIC POLYNEUROPATHY, WITH LONG-TERM CURRENT USE OF INSULIN (H): ICD-10-CM

## 2021-05-13 DIAGNOSIS — E78.5 HYPERLIPIDEMIA LDL GOAL <100: ICD-10-CM

## 2021-05-13 DIAGNOSIS — E11.42 TYPE 2 DIABETES MELLITUS WITH DIABETIC POLYNEUROPATHY, WITH LONG-TERM CURRENT USE OF INSULIN (H): ICD-10-CM

## 2021-05-13 DIAGNOSIS — I10 BENIGN ESSENTIAL HYPERTENSION: ICD-10-CM

## 2021-05-13 RX ORDER — SIMVASTATIN 10 MG
TABLET ORAL
Qty: 90 TABLET | Refills: 0 | Status: SHIPPED | OUTPATIENT
Start: 2021-05-13 | End: 2021-08-11

## 2021-05-14 RX ORDER — AMLODIPINE BESYLATE 10 MG/1
TABLET ORAL
Qty: 90 TABLET | Refills: 1 | Status: SHIPPED | OUTPATIENT
Start: 2021-05-14 | End: 2021-11-17

## 2021-05-14 RX ORDER — LOSARTAN POTASSIUM 100 MG/1
TABLET ORAL
Qty: 30 TABLET | Refills: 5 | Status: SHIPPED | OUTPATIENT
Start: 2021-05-14

## 2021-05-14 RX ORDER — PIOGLITAZONEHYDROCHLORIDE 30 MG/1
TABLET ORAL
Qty: 90 TABLET | Refills: 1 | Status: SHIPPED | OUTPATIENT
Start: 2021-05-14 | End: 2021-12-13

## 2021-05-14 NOTE — TELEPHONE ENCOUNTER
Pioglitazone (ACTOS) 30mg      Last Written Prescription Date:  10/27/2020  Last Fill Quantity: 90,   # refills: 1  Last Office Visit: 3/3/2021  Future Office visit:       Routing refill request to provider for review/approval because:    Losartan (COZAAR) 100mg      Last Written Prescription Date:  10/27/2020  Last Fill Quantity: 30,   # refills: 5  Last Office Visit: 3/3/2021  Future Office visit:       Routing refill request to provider for review/approval because:    amLODIPine (NORVASC) 10mg      Last Written Prescription Date:  10/27/2020  Last Fill Quantity: 90,   # refills: 1  Last Office Visit: 3/3/2021  Future Office visit:       Routing refill request to provider for review/approval because:

## 2021-05-25 ENCOUNTER — TRANSFERRED RECORDS (OUTPATIENT)
Dept: HEALTH INFORMATION MANAGEMENT | Facility: HOSPITAL | Age: 51
End: 2021-05-25
Payer: COMMERCIAL

## 2021-06-12 DIAGNOSIS — G44.209 TENSION HEADACHE: ICD-10-CM

## 2021-06-14 RX ORDER — IBUPROFEN 800 MG/1
TABLET, FILM COATED ORAL
Qty: 90 TABLET | Refills: 0 | Status: SHIPPED | OUTPATIENT
Start: 2021-06-14 | End: 2022-03-10

## 2021-06-14 NOTE — TELEPHONE ENCOUNTER
ADVIL      Last Written Prescription Date:  4-7-2021  Last Fill Quantity: 90,   # refills: 0  Last Office Visit: 3-3-2021  Future Office visit:       Routing refill request to provider for review/approval because:  Drug not on the [facility], P or [facility] refill protocol or controlled substance

## 2021-07-15 ENCOUNTER — TRANSFERRED RECORDS (OUTPATIENT)
Dept: HEALTH INFORMATION MANAGEMENT | Facility: HOSPITAL | Age: 51
End: 2021-07-15

## 2021-07-15 LAB — HBA1C MFR BLD: 9.5 % (ref 4–5.6)

## 2021-08-07 ENCOUNTER — HEALTH MAINTENANCE LETTER (OUTPATIENT)
Age: 51
End: 2021-08-07

## 2021-08-10 DIAGNOSIS — E87.6 HYPOKALEMIA: ICD-10-CM

## 2021-08-10 DIAGNOSIS — E78.5 HYPERLIPIDEMIA LDL GOAL <100: ICD-10-CM

## 2021-08-10 NOTE — LETTER
August 11, 2021      Lili Nava  [address]   [facility] [place] [address]        Dear Lili,     APPOINTMENT REMINDER:   Our records indicates that it is time for you to be seen for a yearly office visit and fasting labs.     Your current medication requests will be approved for one refill but you will need to be seen before any additional refills can be approved.  Taking care of your health is important to us, and ongoing visits with your provider are vital to your care.    We look forward to seeing you in the near future.  You may call our office at [phone] to schedule a visit.     Please disregard this notice if you have already made an appointment.        Sincerely,    Daja Obrien NP

## 2021-08-11 RX ORDER — SIMVASTATIN 10 MG
TABLET ORAL
Qty: 90 TABLET | Refills: 0 | Status: SHIPPED | OUTPATIENT
Start: 2021-08-11 | End: 2021-11-17

## 2021-08-11 RX ORDER — POTASSIUM CHLORIDE 1500 MG/1
TABLET, EXTENDED RELEASE ORAL
Qty: 90 TABLET | Refills: 0 | Status: SHIPPED | OUTPATIENT
Start: 2021-08-11 | End: 2021-11-17

## 2021-08-11 NOTE — TELEPHONE ENCOUNTER
simvastatin (ZOCOR) 10 MG tablet  Last Written Prescription Date:  5/13/21  Last Fill Quantity: 90,  # refills: 0     KLOR-CON 20 MEQ CR tablet  Last Written Prescription Date:  2/2/21  Last Fill Quantity: 90,  # refills: 1     Last office visit: 7/28/20   Future Office Visit:      Routing refill request to provider for review/approval because:  Labs not current: lipids, CMP    Recall letter sent.

## 2021-08-13 RX ORDER — LOSARTAN POTASSIUM 50 MG/1
TABLET ORAL
Qty: 30 TABLET | Refills: 0 | OUTPATIENT
Start: 2021-08-13

## 2021-10-03 ENCOUNTER — HEALTH MAINTENANCE LETTER (OUTPATIENT)
Age: 51
End: 2021-10-03

## 2021-11-16 DIAGNOSIS — I10 BENIGN ESSENTIAL HYPERTENSION: ICD-10-CM

## 2021-11-17 RX ORDER — AMLODIPINE BESYLATE 10 MG/1
TABLET ORAL
Qty: 90 TABLET | Refills: 1 | Status: SHIPPED | OUTPATIENT
Start: 2021-11-17 | End: 2022-08-08

## 2021-11-17 NOTE — TELEPHONE ENCOUNTER
amLODIPine Besylate 10 MG Oral Tablet (Norvasc)  Last Written Prescription Date:  5/14/2021  Last Fill Quantity: 90,   # refills: 1  Last Office Visit: 3/3/2021  Future Office visit:       Routing refill request to provider for review/approval because:

## 2021-11-27 ENCOUNTER — HEALTH MAINTENANCE LETTER (OUTPATIENT)
Age: 51
End: 2021-11-27

## 2022-01-22 ENCOUNTER — HEALTH MAINTENANCE LETTER (OUTPATIENT)
Age: 52
End: 2022-01-22

## 2022-02-27 DIAGNOSIS — E87.6 HYPOKALEMIA: ICD-10-CM

## 2022-02-28 RX ORDER — POTASSIUM CHLORIDE 1500 MG/1
TABLET, EXTENDED RELEASE ORAL
Qty: 90 TABLET | Refills: 0 | Status: SHIPPED | OUTPATIENT
Start: 2022-02-28

## 2022-02-28 NOTE — TELEPHONE ENCOUNTER
potassium      Last Written Prescription Date:  11/17/21  Last Fill Quantity: 90,   # refills: 0  Last Office Visit: 3/3/21  Future Office visit:

## 2022-03-10 ENCOUNTER — MYC REFILL (OUTPATIENT)
Dept: FAMILY MEDICINE | Facility: OTHER | Age: 52
End: 2022-03-10

## 2022-03-10 DIAGNOSIS — G44.209 TENSION HEADACHE: ICD-10-CM

## 2022-03-11 RX ORDER — IBUPROFEN 800 MG/1
800 TABLET, FILM COATED ORAL EVERY 8 HOURS PRN
Qty: 90 TABLET | Refills: 1 | Status: SHIPPED | OUTPATIENT
Start: 2022-03-11

## 2022-03-11 NOTE — TELEPHONE ENCOUNTER
Ibuprofen      Last Written Prescription Date:  6/14/21  Last Fill Quantity: 90,   # refills: 0  Last Office Visit: 3/3/21  Future Office visit:       Routing refill request to provider for review/approval because:

## 2022-04-20 NOTE — TELEPHONE ENCOUNTER
Pt called reports she was a now show for her last appt on 2/18/21 for HTN, diabetic f/u. Pt stated she had started a new job and couldn't leave.   Pt would also like to schedule an appt for a cough and cold sx, sx started 2/19/21. Pt reports tested for COVID at [facility] on 2/17/21, pt reports results were negative.     Pt reports congestion, moderate dry cough. Denies fever denies any other concerns. Pt stated she wanted to be seen for the cough and the appt she no showed for; however, writer explained she could schedule an appt for the cough and her f/u would be a separate appt, pt verbalizes understanding.     Dr. Bond can pt take your symptomatic time slot on hold,  Only today at 4:30? Pt reports tomorrow and Friday she works 7-4:30 can't come in.     Call back number [phone]   None

## 2022-06-15 DIAGNOSIS — E87.6 HYPOKALEMIA: ICD-10-CM

## 2022-06-17 RX ORDER — POTASSIUM CHLORIDE 1500 MG/1
TABLET, EXTENDED RELEASE ORAL
Qty: 90 TABLET | Refills: 0 | OUTPATIENT
Start: 2022-06-17

## 2022-08-05 DIAGNOSIS — E78.5 HYPERLIPIDEMIA LDL GOAL <100: ICD-10-CM

## 2022-08-08 RX ORDER — SIMVASTATIN 10 MG
TABLET ORAL
Qty: 90 TABLET | Refills: 0 | Status: SHIPPED | OUTPATIENT
Start: 2022-08-08

## 2022-08-08 NOTE — TELEPHONE ENCOUNTER
Zocor       Last Written Prescription Date:  4/29/22  Last Fill Quantity: 90,   # refills: 0  Last Office Visit: 3/3/21  Future Office visit:

## 2022-09-04 ENCOUNTER — HEALTH MAINTENANCE LETTER (OUTPATIENT)
Age: 52
End: 2022-09-04

## 2023-01-15 ENCOUNTER — HEALTH MAINTENANCE LETTER (OUTPATIENT)
Age: 53
End: 2023-01-15

## 2023-02-06 DIAGNOSIS — I10 BENIGN ESSENTIAL HYPERTENSION: ICD-10-CM

## 2023-02-06 DIAGNOSIS — E11.42 TYPE 2 DIABETES MELLITUS WITH DIABETIC POLYNEUROPATHY, WITH LONG-TERM CURRENT USE OF INSULIN (H): ICD-10-CM

## 2023-02-06 DIAGNOSIS — Z79.4 TYPE 2 DIABETES MELLITUS WITH DIABETIC POLYNEUROPATHY, WITH LONG-TERM CURRENT USE OF INSULIN (H): ICD-10-CM

## 2023-02-07 RX ORDER — PIOGLITAZONEHYDROCHLORIDE 30 MG/1
TABLET ORAL
Qty: 90 TABLET | Refills: 1 | Status: SHIPPED | OUTPATIENT
Start: 2023-02-07

## 2023-02-07 RX ORDER — AMLODIPINE BESYLATE 10 MG/1
TABLET ORAL
Qty: 90 TABLET | Refills: 1 | Status: SHIPPED | OUTPATIENT
Start: 2023-02-07

## 2023-02-07 NOTE — TELEPHONE ENCOUNTER
amLODIPine 10mg      Last Written Prescription Date:  8/8/22  Last Fill Quantity: 90,   # refills: 1  Last Office Visit: 7/15/21  Future Office visit:       Routing refill request to provider for review/approval because:

## 2023-04-29 ENCOUNTER — HEALTH MAINTENANCE LETTER (OUTPATIENT)
Age: 53
End: 2023-04-29

## 2023-10-01 ENCOUNTER — HEALTH MAINTENANCE LETTER (OUTPATIENT)
Age: 53
End: 2023-10-01